# Patient Record
Sex: FEMALE | Race: WHITE | NOT HISPANIC OR LATINO | Employment: OTHER | URBAN - METROPOLITAN AREA
[De-identification: names, ages, dates, MRNs, and addresses within clinical notes are randomized per-mention and may not be internally consistent; named-entity substitution may affect disease eponyms.]

---

## 2017-02-07 ENCOUNTER — COMMUNICATION - HEALTHEAST (OUTPATIENT)
Dept: FAMILY MEDICINE | Facility: CLINIC | Age: 67
End: 2017-02-07

## 2017-02-07 DIAGNOSIS — E78.5 DYSLIPIDEMIA: ICD-10-CM

## 2017-02-20 ENCOUNTER — COMMUNICATION - HEALTHEAST (OUTPATIENT)
Dept: FAMILY MEDICINE | Facility: CLINIC | Age: 67
End: 2017-02-20

## 2017-02-20 DIAGNOSIS — I10 ESSENTIAL HYPERTENSION: ICD-10-CM

## 2017-05-22 ENCOUNTER — COMMUNICATION - HEALTHEAST (OUTPATIENT)
Dept: FAMILY MEDICINE | Facility: CLINIC | Age: 67
End: 2017-05-22

## 2017-05-22 DIAGNOSIS — E03.9 HYPOTHYROIDISM: ICD-10-CM

## 2017-06-09 ENCOUNTER — COMMUNICATION - HEALTHEAST (OUTPATIENT)
Dept: FAMILY MEDICINE | Facility: CLINIC | Age: 67
End: 2017-06-09

## 2017-06-09 DIAGNOSIS — I10 ESSENTIAL HYPERTENSION: ICD-10-CM

## 2017-06-15 ENCOUNTER — COMMUNICATION - HEALTHEAST (OUTPATIENT)
Dept: FAMILY MEDICINE | Facility: CLINIC | Age: 67
End: 2017-06-15

## 2017-06-15 DIAGNOSIS — E78.5 DYSLIPIDEMIA: ICD-10-CM

## 2017-08-22 ENCOUNTER — COMMUNICATION - HEALTHEAST (OUTPATIENT)
Dept: FAMILY MEDICINE | Facility: CLINIC | Age: 67
End: 2017-08-22

## 2017-08-22 ENCOUNTER — COMMUNICATION - HEALTHEAST (OUTPATIENT)
Dept: SURGERY | Facility: CLINIC | Age: 67
End: 2017-08-22

## 2017-08-22 DIAGNOSIS — E03.9 HYPOTHYROIDISM: ICD-10-CM

## 2017-08-22 DIAGNOSIS — R79.89 HIGH SERUM PARATHYROID HORMONE (PTH): ICD-10-CM

## 2017-08-22 DIAGNOSIS — E55.9 VITAMIN D DEFICIENCY: ICD-10-CM

## 2017-08-22 DIAGNOSIS — I10 ESSENTIAL HYPERTENSION: ICD-10-CM

## 2017-08-24 ENCOUNTER — COMMUNICATION - HEALTHEAST (OUTPATIENT)
Dept: FAMILY MEDICINE | Facility: CLINIC | Age: 67
End: 2017-08-24

## 2017-08-24 DIAGNOSIS — E03.9 HYPOTHYROIDISM: ICD-10-CM

## 2017-10-04 ENCOUNTER — COMMUNICATION - HEALTHEAST (OUTPATIENT)
Dept: SURGERY | Facility: CLINIC | Age: 67
End: 2017-10-04

## 2017-10-04 DIAGNOSIS — I10 HYPERTENSION: ICD-10-CM

## 2017-10-26 ENCOUNTER — COMMUNICATION - HEALTHEAST (OUTPATIENT)
Dept: FAMILY MEDICINE | Facility: CLINIC | Age: 67
End: 2017-10-26

## 2017-10-26 DIAGNOSIS — E03.9 HYPOTHYROIDISM: ICD-10-CM

## 2017-10-31 ENCOUNTER — RECORDS - HEALTHEAST (OUTPATIENT)
Dept: ADMINISTRATIVE | Facility: OTHER | Age: 67
End: 2017-10-31

## 2017-11-02 ENCOUNTER — COMMUNICATION - HEALTHEAST (OUTPATIENT)
Dept: FAMILY MEDICINE | Facility: CLINIC | Age: 67
End: 2017-11-02

## 2017-11-10 ENCOUNTER — AMBULATORY - HEALTHEAST (OUTPATIENT)
Dept: SURGERY | Facility: CLINIC | Age: 67
End: 2017-11-10

## 2017-11-10 DIAGNOSIS — E55.9 VITAMIN D DEFICIENCY: ICD-10-CM

## 2017-11-10 DIAGNOSIS — Z98.84 S/P BARIATRIC SURGERY: ICD-10-CM

## 2017-11-10 DIAGNOSIS — K91.2 POSTOPERATIVE MALABSORPTION: ICD-10-CM

## 2017-11-10 DIAGNOSIS — K90.9 INTESTINAL MALABSORPTION, UNSPECIFIED TYPE: ICD-10-CM

## 2017-11-15 ENCOUNTER — AMBULATORY - HEALTHEAST (OUTPATIENT)
Dept: LAB | Facility: HOSPITAL | Age: 67
End: 2017-11-15

## 2017-11-15 DIAGNOSIS — M81.0 SENILE OSTEOPOROSIS: ICD-10-CM

## 2017-11-15 DIAGNOSIS — K91.2 POSTOPERATIVE MALABSORPTION: ICD-10-CM

## 2017-11-15 DIAGNOSIS — Z98.84 S/P BARIATRIC SURGERY: ICD-10-CM

## 2017-11-15 DIAGNOSIS — K90.9 INTESTINAL MALABSORPTION, UNSPECIFIED TYPE: ICD-10-CM

## 2017-11-15 DIAGNOSIS — E55.9 VITAMIN D DEFICIENCY: ICD-10-CM

## 2017-11-15 LAB
CHOLEST SERPL-MCNC: 175 MG/DL
FASTING STATUS PATIENT QL REPORTED: YES
HDLC SERPL-MCNC: 52 MG/DL
LDLC SERPL CALC-MCNC: 108 MG/DL
TRIGL SERPL-MCNC: 73 MG/DL

## 2017-11-16 LAB — HBA1C MFR BLD: 5.7 % (ref 4.2–6.1)

## 2017-11-22 ENCOUNTER — AMBULATORY - HEALTHEAST (OUTPATIENT)
Dept: LAB | Facility: HOSPITAL | Age: 67
End: 2017-11-22

## 2017-11-22 ENCOUNTER — AMBULATORY - HEALTHEAST (OUTPATIENT)
Dept: SCHEDULING | Facility: CLINIC | Age: 67
End: 2017-11-22

## 2017-11-22 ENCOUNTER — OFFICE VISIT - HEALTHEAST (OUTPATIENT)
Dept: SURGERY | Facility: CLINIC | Age: 67
End: 2017-11-22

## 2017-11-22 DIAGNOSIS — K91.2 POSTOPERATIVE MALABSORPTION: ICD-10-CM

## 2017-11-22 DIAGNOSIS — M81.0 OSTEOPOROSIS: ICD-10-CM

## 2017-11-22 DIAGNOSIS — M81.0 SENILE OSTEOPOROSIS: ICD-10-CM

## 2017-11-22 DIAGNOSIS — E03.9 HYPOTHYROIDISM: ICD-10-CM

## 2017-11-22 LAB
CREAT SERPL-MCNC: 0.62 MG/DL (ref 0.6–1.1)
GFR SERPL CREATININE-BSD FRML MDRD: >60 ML/MIN/1.73M2

## 2017-11-22 ASSESSMENT — MIFFLIN-ST. JEOR: SCORE: 1641.73

## 2017-11-23 ENCOUNTER — COMMUNICATION - HEALTHEAST (OUTPATIENT)
Dept: FAMILY MEDICINE | Facility: CLINIC | Age: 67
End: 2017-11-23

## 2017-11-26 ENCOUNTER — COMMUNICATION - HEALTHEAST (OUTPATIENT)
Dept: FAMILY MEDICINE | Facility: CLINIC | Age: 67
End: 2017-11-26

## 2017-11-26 DIAGNOSIS — I10 ESSENTIAL HYPERTENSION: ICD-10-CM

## 2017-12-18 ENCOUNTER — OFFICE VISIT - HEALTHEAST (OUTPATIENT)
Dept: FAMILY MEDICINE | Facility: CLINIC | Age: 67
End: 2017-12-18

## 2017-12-18 DIAGNOSIS — Z98.84 HISTORY OF GASTRIC BYPASS: ICD-10-CM

## 2017-12-18 DIAGNOSIS — N32.81 OVERACTIVE BLADDER: ICD-10-CM

## 2017-12-18 DIAGNOSIS — I89.0 ACQUIRED LYMPHEDEMA OF LEG: ICD-10-CM

## 2017-12-18 DIAGNOSIS — I10 ESSENTIAL HYPERTENSION: ICD-10-CM

## 2017-12-18 DIAGNOSIS — E03.9 HYPOTHYROIDISM: ICD-10-CM

## 2018-01-25 ENCOUNTER — COMMUNICATION - HEALTHEAST (OUTPATIENT)
Dept: FAMILY MEDICINE | Facility: CLINIC | Age: 68
End: 2018-01-25

## 2018-02-06 ENCOUNTER — RECORDS - HEALTHEAST (OUTPATIENT)
Dept: ADMINISTRATIVE | Facility: OTHER | Age: 68
End: 2018-02-06

## 2018-02-10 ENCOUNTER — COMMUNICATION - HEALTHEAST (OUTPATIENT)
Dept: FAMILY MEDICINE | Facility: CLINIC | Age: 68
End: 2018-02-10

## 2018-02-10 DIAGNOSIS — E03.9 HYPOTHYROIDISM: ICD-10-CM

## 2018-02-22 ENCOUNTER — OFFICE VISIT - HEALTHEAST (OUTPATIENT)
Dept: FAMILY MEDICINE | Facility: CLINIC | Age: 68
End: 2018-02-22

## 2018-02-22 DIAGNOSIS — I10 ESSENTIAL HYPERTENSION: ICD-10-CM

## 2018-02-22 DIAGNOSIS — R79.89 ELEVATED PARATHYROID HORMONE: ICD-10-CM

## 2018-02-22 DIAGNOSIS — N32.81 OVERACTIVE BLADDER: ICD-10-CM

## 2018-02-22 DIAGNOSIS — E03.9 HYPOTHYROIDISM: ICD-10-CM

## 2018-03-22 ENCOUNTER — OFFICE VISIT - HEALTHEAST (OUTPATIENT)
Dept: FAMILY MEDICINE | Facility: CLINIC | Age: 68
End: 2018-03-22

## 2018-03-22 DIAGNOSIS — M16.9 OA (OSTEOARTHRITIS) OF HIP: ICD-10-CM

## 2018-03-22 DIAGNOSIS — I10 ESSENTIAL HYPERTENSION: ICD-10-CM

## 2018-03-22 DIAGNOSIS — M25.532 LEFT WRIST PAIN: ICD-10-CM

## 2018-03-22 DIAGNOSIS — E03.9 HYPOTHYROIDISM: ICD-10-CM

## 2018-03-22 DIAGNOSIS — E11.9 TYPE 2 DIABETES MELLITUS WITHOUT COMPLICATION (H): ICD-10-CM

## 2018-03-22 DIAGNOSIS — E21.3 HYPERPARATHYROIDISM (H): ICD-10-CM

## 2018-03-22 DIAGNOSIS — L98.9 SKIN LESION OF FACE: ICD-10-CM

## 2018-03-22 DIAGNOSIS — N32.81 OVERACTIVE BLADDER: ICD-10-CM

## 2018-03-22 LAB
CREAT UR-MCNC: 15.8 MG/DL
HBA1C MFR BLD: 5.3 % (ref 3.5–6)
MICROALBUMIN UR-MCNC: <0.5 MG/DL (ref 0–1.99)
MICROALBUMIN/CREAT UR: NORMAL MG/G
T4 FREE SERPL-MCNC: 1.3 NG/DL (ref 0.7–1.8)
TSH SERPL DL<=0.005 MIU/L-ACNC: 0.11 UIU/ML (ref 0.3–5)

## 2018-03-25 ENCOUNTER — COMMUNICATION - HEALTHEAST (OUTPATIENT)
Dept: FAMILY MEDICINE | Facility: CLINIC | Age: 68
End: 2018-03-25

## 2018-03-25 DIAGNOSIS — E03.9 HYPOTHYROIDISM: ICD-10-CM

## 2018-04-03 ENCOUNTER — COMMUNICATION - HEALTHEAST (OUTPATIENT)
Dept: FAMILY MEDICINE | Facility: CLINIC | Age: 68
End: 2018-04-03

## 2018-04-05 ENCOUNTER — COMMUNICATION - HEALTHEAST (OUTPATIENT)
Dept: FAMILY MEDICINE | Facility: CLINIC | Age: 68
End: 2018-04-05

## 2018-05-06 ENCOUNTER — COMMUNICATION - HEALTHEAST (OUTPATIENT)
Dept: FAMILY MEDICINE | Facility: CLINIC | Age: 68
End: 2018-05-06

## 2018-05-06 DIAGNOSIS — E78.5 DYSLIPIDEMIA: ICD-10-CM

## 2018-05-22 ENCOUNTER — RECORDS - HEALTHEAST (OUTPATIENT)
Dept: ADMINISTRATIVE | Facility: OTHER | Age: 68
End: 2018-05-22

## 2018-05-25 ENCOUNTER — COMMUNICATION - HEALTHEAST (OUTPATIENT)
Dept: FAMILY MEDICINE | Facility: CLINIC | Age: 68
End: 2018-05-25

## 2018-06-23 ENCOUNTER — OFFICE VISIT - HEALTHEAST (OUTPATIENT)
Dept: FAMILY MEDICINE | Facility: CLINIC | Age: 68
End: 2018-06-23

## 2018-06-23 DIAGNOSIS — J06.9 UPPER RESPIRATORY INFECTION: ICD-10-CM

## 2018-06-23 DIAGNOSIS — R39.9 UTI SYMPTOMS: ICD-10-CM

## 2018-06-23 DIAGNOSIS — N30.00 ACUTE CYSTITIS WITHOUT HEMATURIA: ICD-10-CM

## 2018-06-23 LAB
ALBUMIN UR-MCNC: NEGATIVE MG/DL
APPEARANCE UR: CLEAR
BACTERIA #/AREA URNS HPF: ABNORMAL HPF
BILIRUB UR QL STRIP: NEGATIVE
COLOR UR AUTO: YELLOW
GLUCOSE UR STRIP-MCNC: NEGATIVE MG/DL
HGB UR QL STRIP: ABNORMAL
KETONES UR STRIP-MCNC: NEGATIVE MG/DL
LEUKOCYTE ESTERASE UR QL STRIP: ABNORMAL
NITRATE UR QL: NEGATIVE
PH UR STRIP: 6 [PH] (ref 5–8)
RBC #/AREA URNS AUTO: ABNORMAL HPF
SP GR UR STRIP: <=1.005 (ref 1–1.03)
SQUAMOUS #/AREA URNS AUTO: ABNORMAL LPF
UROBILINOGEN UR STRIP-ACNC: ABNORMAL
WBC #/AREA URNS AUTO: ABNORMAL HPF

## 2018-06-25 LAB — BACTERIA SPEC CULT: ABNORMAL

## 2018-07-16 ENCOUNTER — OFFICE VISIT - HEALTHEAST (OUTPATIENT)
Dept: FAMILY MEDICINE | Facility: CLINIC | Age: 68
End: 2018-07-16

## 2018-07-16 DIAGNOSIS — I10 ESSENTIAL HYPERTENSION: ICD-10-CM

## 2018-07-16 DIAGNOSIS — E03.9 HYPOTHYROIDISM: ICD-10-CM

## 2018-07-16 DIAGNOSIS — E11.9 TYPE 2 DIABETES MELLITUS WITHOUT COMPLICATION (H): ICD-10-CM

## 2018-07-16 DIAGNOSIS — Z12.11 SCREEN FOR COLON CANCER: ICD-10-CM

## 2018-07-16 LAB
HBA1C MFR BLD: 5.6 % (ref 3.5–6)
T4 FREE SERPL-MCNC: 1 NG/DL (ref 0.7–1.8)
TSH SERPL DL<=0.005 MIU/L-ACNC: 0.23 UIU/ML (ref 0.3–5)

## 2018-07-24 ENCOUNTER — OFFICE VISIT - HEALTHEAST (OUTPATIENT)
Dept: SURGERY | Facility: CLINIC | Age: 68
End: 2018-07-24

## 2018-07-24 DIAGNOSIS — F43.20 ADJUSTMENT DISORDER: ICD-10-CM

## 2018-09-21 ENCOUNTER — OFFICE VISIT - HEALTHEAST (OUTPATIENT)
Dept: VASCULAR SURGERY | Facility: CLINIC | Age: 68
End: 2018-09-21

## 2018-09-21 DIAGNOSIS — L90.5 SCAR CONDITION AND FIBROSIS OF SKIN: ICD-10-CM

## 2018-09-21 DIAGNOSIS — I89.0 ACQUIRED LYMPHEDEMA OF LEG: ICD-10-CM

## 2018-09-21 DIAGNOSIS — I87.8 VENOUS STASIS OF LOWER EXTREMITY: ICD-10-CM

## 2018-09-21 ASSESSMENT — MIFFLIN-ST. JEOR: SCORE: 1790.51

## 2018-11-10 ENCOUNTER — COMMUNICATION - HEALTHEAST (OUTPATIENT)
Dept: FAMILY MEDICINE | Facility: CLINIC | Age: 68
End: 2018-11-10

## 2018-11-10 DIAGNOSIS — E78.5 DYSLIPIDEMIA: ICD-10-CM

## 2018-11-21 ENCOUNTER — OFFICE VISIT - HEALTHEAST (OUTPATIENT)
Dept: FAMILY MEDICINE | Facility: CLINIC | Age: 68
End: 2018-11-21

## 2018-11-21 DIAGNOSIS — N30.01 ACUTE CYSTITIS WITH HEMATURIA: ICD-10-CM

## 2018-11-21 DIAGNOSIS — R30.0 BURNING WITH URINATION: ICD-10-CM

## 2018-11-21 LAB
ALBUMIN UR-MCNC: NEGATIVE MG/DL
APPEARANCE UR: CLEAR
BACTERIA #/AREA URNS HPF: ABNORMAL HPF
BILIRUB UR QL STRIP: NEGATIVE
COLOR UR AUTO: YELLOW
GLUCOSE UR STRIP-MCNC: NEGATIVE MG/DL
HGB UR QL STRIP: ABNORMAL
KETONES UR STRIP-MCNC: NEGATIVE MG/DL
LEUKOCYTE ESTERASE UR QL STRIP: ABNORMAL
NITRATE UR QL: NEGATIVE
PH UR STRIP: 6 [PH] (ref 5–8)
RBC #/AREA URNS AUTO: ABNORMAL HPF
SP GR UR STRIP: 1.01 (ref 1–1.03)
SQUAMOUS #/AREA URNS AUTO: ABNORMAL LPF
UROBILINOGEN UR STRIP-ACNC: ABNORMAL
WBC #/AREA URNS AUTO: ABNORMAL HPF
WBC CLUMPS #/AREA URNS HPF: PRESENT /[HPF]

## 2018-11-24 LAB — BACTERIA SPEC CULT: ABNORMAL

## 2019-01-02 ENCOUNTER — COMMUNICATION - HEALTHEAST (OUTPATIENT)
Dept: FAMILY MEDICINE | Facility: CLINIC | Age: 69
End: 2019-01-02

## 2019-01-02 DIAGNOSIS — N32.81 OVERACTIVE BLADDER: ICD-10-CM

## 2019-03-03 ENCOUNTER — COMMUNICATION - HEALTHEAST (OUTPATIENT)
Dept: FAMILY MEDICINE | Facility: CLINIC | Age: 69
End: 2019-03-03

## 2019-03-03 DIAGNOSIS — I10 ESSENTIAL HYPERTENSION: ICD-10-CM

## 2019-03-10 ENCOUNTER — COMMUNICATION - HEALTHEAST (OUTPATIENT)
Dept: FAMILY MEDICINE | Facility: CLINIC | Age: 69
End: 2019-03-10

## 2019-03-10 DIAGNOSIS — I10 ESSENTIAL HYPERTENSION: ICD-10-CM

## 2019-04-07 ENCOUNTER — COMMUNICATION - HEALTHEAST (OUTPATIENT)
Dept: FAMILY MEDICINE | Facility: CLINIC | Age: 69
End: 2019-04-07

## 2019-04-07 DIAGNOSIS — I10 ESSENTIAL HYPERTENSION: ICD-10-CM

## 2019-04-28 ENCOUNTER — COMMUNICATION - HEALTHEAST (OUTPATIENT)
Dept: FAMILY MEDICINE | Facility: CLINIC | Age: 69
End: 2019-04-28

## 2019-04-28 DIAGNOSIS — E03.9 HYPOTHYROIDISM, UNSPECIFIED TYPE: ICD-10-CM

## 2019-06-02 ENCOUNTER — COMMUNICATION - HEALTHEAST (OUTPATIENT)
Dept: FAMILY MEDICINE | Facility: CLINIC | Age: 69
End: 2019-06-02

## 2019-06-02 DIAGNOSIS — I10 ESSENTIAL HYPERTENSION: ICD-10-CM

## 2019-06-30 ENCOUNTER — COMMUNICATION - HEALTHEAST (OUTPATIENT)
Dept: FAMILY MEDICINE | Facility: CLINIC | Age: 69
End: 2019-06-30

## 2019-06-30 DIAGNOSIS — I10 ESSENTIAL HYPERTENSION: ICD-10-CM

## 2019-08-17 ENCOUNTER — COMMUNICATION - HEALTHEAST (OUTPATIENT)
Dept: FAMILY MEDICINE | Facility: CLINIC | Age: 69
End: 2019-08-17

## 2019-08-17 DIAGNOSIS — E78.5 DYSLIPIDEMIA: ICD-10-CM

## 2019-08-26 ENCOUNTER — COMMUNICATION - HEALTHEAST (OUTPATIENT)
Dept: FAMILY MEDICINE | Facility: CLINIC | Age: 69
End: 2019-08-26

## 2019-09-01 ENCOUNTER — COMMUNICATION - HEALTHEAST (OUTPATIENT)
Dept: FAMILY MEDICINE | Facility: CLINIC | Age: 69
End: 2019-09-01

## 2019-09-01 DIAGNOSIS — I10 ESSENTIAL HYPERTENSION: ICD-10-CM

## 2019-09-27 ENCOUNTER — COMMUNICATION - HEALTHEAST (OUTPATIENT)
Dept: FAMILY MEDICINE | Facility: CLINIC | Age: 69
End: 2019-09-27

## 2019-09-27 ENCOUNTER — COMMUNICATION - HEALTHEAST (OUTPATIENT)
Dept: SURGERY | Facility: CLINIC | Age: 69
End: 2019-09-27

## 2019-09-27 DIAGNOSIS — E03.9 HYPOTHYROIDISM, UNSPECIFIED TYPE: ICD-10-CM

## 2019-09-29 ENCOUNTER — COMMUNICATION - HEALTHEAST (OUTPATIENT)
Dept: FAMILY MEDICINE | Facility: CLINIC | Age: 69
End: 2019-09-29

## 2019-09-29 DIAGNOSIS — I10 ESSENTIAL HYPERTENSION: ICD-10-CM

## 2019-10-04 ENCOUNTER — COMMUNICATION - HEALTHEAST (OUTPATIENT)
Dept: SURGERY | Facility: CLINIC | Age: 69
End: 2019-10-04

## 2019-10-04 DIAGNOSIS — Z98.84 S/P BARIATRIC SURGERY: ICD-10-CM

## 2019-10-04 DIAGNOSIS — K90.9 INTESTINAL MALABSORPTION, UNSPECIFIED TYPE: ICD-10-CM

## 2019-10-04 DIAGNOSIS — K91.2 POSTOPERATIVE MALABSORPTION: ICD-10-CM

## 2019-10-28 ENCOUNTER — OFFICE VISIT - HEALTHEAST (OUTPATIENT)
Dept: FAMILY MEDICINE | Facility: CLINIC | Age: 69
End: 2019-10-28

## 2019-10-28 DIAGNOSIS — E21.3 HYPERPARATHYROIDISM (H): ICD-10-CM

## 2019-10-28 DIAGNOSIS — N32.81 OVERACTIVE BLADDER: ICD-10-CM

## 2019-10-28 DIAGNOSIS — Z98.84 S/P BARIATRIC SURGERY: ICD-10-CM

## 2019-10-28 DIAGNOSIS — I10 ESSENTIAL HYPERTENSION: ICD-10-CM

## 2019-10-28 DIAGNOSIS — K90.9 INTESTINAL MALABSORPTION, UNSPECIFIED TYPE: ICD-10-CM

## 2019-10-28 DIAGNOSIS — Z12.11 SCREEN FOR COLON CANCER: ICD-10-CM

## 2019-10-28 DIAGNOSIS — Z78.0 POSTMENOPAUSAL STATUS: ICD-10-CM

## 2019-10-28 DIAGNOSIS — E11.9 TYPE 2 DIABETES MELLITUS WITHOUT COMPLICATION, WITHOUT LONG-TERM CURRENT USE OF INSULIN (H): ICD-10-CM

## 2019-10-28 DIAGNOSIS — Z12.31 VISIT FOR SCREENING MAMMOGRAM: ICD-10-CM

## 2019-10-28 DIAGNOSIS — E03.9 HYPOTHYROIDISM, UNSPECIFIED TYPE: ICD-10-CM

## 2019-10-28 DIAGNOSIS — K91.2 POSTOPERATIVE MALABSORPTION: ICD-10-CM

## 2019-10-28 DIAGNOSIS — E78.5 DYSLIPIDEMIA: ICD-10-CM

## 2019-10-28 LAB
ALBUMIN SERPL-MCNC: 3.7 G/DL (ref 3.5–5)
ALP SERPL-CCNC: 120 U/L (ref 45–120)
ALT SERPL W P-5'-P-CCNC: 21 U/L (ref 0–45)
ANION GAP SERPL CALCULATED.3IONS-SCNC: 9 MMOL/L (ref 5–18)
AST SERPL W P-5'-P-CCNC: 18 U/L (ref 0–40)
BILIRUB SERPL-MCNC: 0.4 MG/DL (ref 0–1)
BUN SERPL-MCNC: 16 MG/DL (ref 8–22)
CALCIUM SERPL-MCNC: 9.4 MG/DL (ref 8.5–10.5)
CHLORIDE BLD-SCNC: 106 MMOL/L (ref 98–107)
CHOLEST SERPL-MCNC: 166 MG/DL
CO2 SERPL-SCNC: 25 MMOL/L (ref 22–31)
CREAT SERPL-MCNC: 0.66 MG/DL (ref 0.6–1.1)
ERYTHROCYTE [DISTWIDTH] IN BLOOD BY AUTOMATED COUNT: 12.8 % (ref 11–14.5)
FASTING STATUS PATIENT QL REPORTED: ABNORMAL
FERRITIN SERPL-MCNC: 47 NG/ML (ref 10–130)
FOLATE SERPL-MCNC: >20 NG/ML
GFR SERPL CREATININE-BSD FRML MDRD: >60 ML/MIN/1.73M2
GLUCOSE BLD-MCNC: 101 MG/DL (ref 70–125)
HBA1C MFR BLD: 5.6 % (ref 3.5–6)
HCT VFR BLD AUTO: 41.4 % (ref 35–47)
HDLC SERPL-MCNC: 41 MG/DL
HGB BLD-MCNC: 14.1 G/DL (ref 12–16)
LDLC SERPL CALC-MCNC: 100 MG/DL
MCH RBC QN AUTO: 29.4 PG (ref 27–34)
MCHC RBC AUTO-ENTMCNC: 34.1 G/DL (ref 32–36)
MCV RBC AUTO: 86 FL (ref 80–100)
PLATELET # BLD AUTO: 267 THOU/UL (ref 140–440)
PMV BLD AUTO: 7.2 FL (ref 7–10)
POTASSIUM BLD-SCNC: 4.4 MMOL/L (ref 3.5–5)
PROT SERPL-MCNC: 7.1 G/DL (ref 6–8)
PTH-INTACT SERPL-MCNC: 176 PG/ML (ref 10–86)
RBC # BLD AUTO: 4.79 MILL/UL (ref 3.8–5.4)
SODIUM SERPL-SCNC: 140 MMOL/L (ref 136–145)
TRIGL SERPL-MCNC: 124 MG/DL
VIT B12 SERPL-MCNC: >2000 PG/ML (ref 213–816)
WBC: 9.7 THOU/UL (ref 4–11)

## 2019-10-28 ASSESSMENT — MIFFLIN-ST. JEOR: SCORE: 1736.53

## 2019-10-29 LAB — 25(OH)D3 SERPL-MCNC: 63.4 NG/ML (ref 30–80)

## 2019-10-31 LAB
ANNOTATION COMMENT IMP: NORMAL
VIT A SERPL-MCNC: 0.71 MG/L (ref 0.3–1.2)
VIT B1 PYROPHOSHATE BLD-SCNC: 57 NMOL/L (ref 70–180)
VITAMIN A (RETINYL PALMITATE): 0.02 MG/L (ref 0–0.1)
ZINC SERPL-MCNC: 104.8 UG/DL (ref 60–120)

## 2019-11-01 ENCOUNTER — AMBULATORY - HEALTHEAST (OUTPATIENT)
Dept: SURGERY | Facility: CLINIC | Age: 69
End: 2019-11-01

## 2019-11-01 DIAGNOSIS — E51.9 THIAMINE DEFICIENCY: ICD-10-CM

## 2019-11-01 DIAGNOSIS — K91.2 POSTOPERATIVE MALABSORPTION: ICD-10-CM

## 2020-01-02 ENCOUNTER — COMMUNICATION - HEALTHEAST (OUTPATIENT)
Dept: FAMILY MEDICINE | Facility: CLINIC | Age: 70
End: 2020-01-02

## 2020-01-02 DIAGNOSIS — R19.5 POSITIVE COLORECTAL CANCER SCREENING USING COLOGUARD TEST: ICD-10-CM

## 2020-01-30 ENCOUNTER — COMMUNICATION - HEALTHEAST (OUTPATIENT)
Dept: FAMILY MEDICINE | Facility: CLINIC | Age: 70
End: 2020-01-30

## 2020-02-05 ENCOUNTER — RECORDS - HEALTHEAST (OUTPATIENT)
Dept: ADMINISTRATIVE | Facility: OTHER | Age: 70
End: 2020-02-05

## 2020-02-06 ENCOUNTER — RECORDS - HEALTHEAST (OUTPATIENT)
Dept: ADMINISTRATIVE | Facility: OTHER | Age: 70
End: 2020-02-06

## 2020-02-10 ENCOUNTER — HOSPITAL ENCOUNTER (OUTPATIENT)
Dept: MAMMOGRAPHY | Facility: CLINIC | Age: 70
Discharge: HOME | End: 2020-02-10
Attending: FAMILY MEDICINE

## 2020-02-10 DIAGNOSIS — Z12.31 VISIT FOR SCREENING MAMMOGRAM: ICD-10-CM

## 2020-02-19 ENCOUNTER — RECORDS - HEALTHEAST (OUTPATIENT)
Dept: ADMINISTRATIVE | Facility: OTHER | Age: 70
End: 2020-02-19

## 2020-02-20 ENCOUNTER — RECORDS - HEALTHEAST (OUTPATIENT)
Dept: ADMINISTRATIVE | Facility: OTHER | Age: 70
End: 2020-02-20

## 2020-02-23 ENCOUNTER — COMMUNICATION - HEALTHEAST (OUTPATIENT)
Dept: FAMILY MEDICINE | Facility: CLINIC | Age: 70
End: 2020-02-23

## 2020-02-23 DIAGNOSIS — I10 ESSENTIAL HYPERTENSION: ICD-10-CM

## 2020-02-23 DIAGNOSIS — E78.5 DYSLIPIDEMIA: ICD-10-CM

## 2020-03-10 ENCOUNTER — RECORDS - HEALTHEAST (OUTPATIENT)
Dept: ADMINISTRATIVE | Facility: OTHER | Age: 70
End: 2020-03-10

## 2020-03-10 LAB — RETINOPATHY: NEGATIVE

## 2020-03-12 ENCOUNTER — RECORDS - HEALTHEAST (OUTPATIENT)
Dept: HEALTH INFORMATION MANAGEMENT | Facility: CLINIC | Age: 70
End: 2020-03-12

## 2020-03-16 ENCOUNTER — COMMUNICATION - HEALTHEAST (OUTPATIENT)
Dept: FAMILY MEDICINE | Facility: CLINIC | Age: 70
End: 2020-03-16

## 2020-03-17 ENCOUNTER — COMMUNICATION - HEALTHEAST (OUTPATIENT)
Dept: FAMILY MEDICINE | Facility: CLINIC | Age: 70
End: 2020-03-17

## 2020-05-05 ENCOUNTER — AMBULATORY - HEALTHEAST (OUTPATIENT)
Dept: SURGERY | Facility: HOSPITAL | Age: 70
End: 2020-05-05

## 2020-05-05 DIAGNOSIS — Z11.59 ENCOUNTER FOR SCREENING FOR OTHER VIRAL DISEASES: ICD-10-CM

## 2020-05-14 ENCOUNTER — OFFICE VISIT - HEALTHEAST (OUTPATIENT)
Dept: FAMILY MEDICINE | Facility: CLINIC | Age: 70
End: 2020-05-14

## 2020-05-14 ENCOUNTER — AMBULATORY - HEALTHEAST (OUTPATIENT)
Dept: LAB | Facility: CLINIC | Age: 70
End: 2020-05-14

## 2020-05-14 DIAGNOSIS — E11.9 TYPE 2 DIABETES MELLITUS WITHOUT COMPLICATION, WITHOUT LONG-TERM CURRENT USE OF INSULIN (H): ICD-10-CM

## 2020-05-14 DIAGNOSIS — Z01.818 PRE-OP EXAM: ICD-10-CM

## 2020-05-14 DIAGNOSIS — Z86.0100 HISTORY OF COLONIC POLYPS: ICD-10-CM

## 2020-05-14 LAB
ANION GAP SERPL CALCULATED.3IONS-SCNC: 7 MMOL/L (ref 5–18)
ATRIAL RATE - MUSE: 65 BPM
BUN SERPL-MCNC: 13 MG/DL (ref 8–22)
CALCIUM SERPL-MCNC: 9.6 MG/DL (ref 8.5–10.5)
CHLORIDE BLD-SCNC: 106 MMOL/L (ref 98–107)
CO2 SERPL-SCNC: 28 MMOL/L (ref 22–31)
CREAT SERPL-MCNC: 0.69 MG/DL (ref 0.6–1.1)
DIASTOLIC BLOOD PRESSURE - MUSE: NORMAL
ERYTHROCYTE [DISTWIDTH] IN BLOOD BY AUTOMATED COUNT: 12.5 % (ref 11–14.5)
GFR SERPL CREATININE-BSD FRML MDRD: >60 ML/MIN/1.73M2
GLUCOSE BLD-MCNC: 92 MG/DL (ref 70–125)
HBA1C MFR BLD: 5.4 % (ref 3.5–6)
HCT VFR BLD AUTO: 40.9 % (ref 35–47)
HGB BLD-MCNC: 14 G/DL (ref 12–16)
INTERPRETATION ECG - MUSE: NORMAL
MCH RBC QN AUTO: 30.2 PG (ref 27–34)
MCHC RBC AUTO-ENTMCNC: 34.2 G/DL (ref 32–36)
MCV RBC AUTO: 88 FL (ref 80–100)
P AXIS - MUSE: 54 DEGREES
PLATELET # BLD AUTO: 293 THOU/UL (ref 140–440)
PMV BLD AUTO: 7.2 FL (ref 7–10)
POTASSIUM BLD-SCNC: 4.5 MMOL/L (ref 3.5–5)
PR INTERVAL - MUSE: 186 MS
QRS DURATION - MUSE: 96 MS
QT - MUSE: 414 MS
QTC - MUSE: 430 MS
R AXIS - MUSE: 27 DEGREES
RBC # BLD AUTO: 4.63 MILL/UL (ref 3.8–5.4)
SODIUM SERPL-SCNC: 141 MMOL/L (ref 136–145)
SYSTOLIC BLOOD PRESSURE - MUSE: NORMAL
T AXIS - MUSE: 83 DEGREES
VENTRICULAR RATE- MUSE: 65 BPM
WBC: 11.8 THOU/UL (ref 4–11)

## 2020-05-14 ASSESSMENT — MIFFLIN-ST. JEOR: SCORE: 1775.53

## 2020-05-15 ENCOUNTER — COMMUNICATION - HEALTHEAST (OUTPATIENT)
Dept: FAMILY MEDICINE | Facility: CLINIC | Age: 70
End: 2020-05-15

## 2020-05-20 ENCOUNTER — OFFICE VISIT - HEALTHEAST (OUTPATIENT)
Dept: FAMILY MEDICINE | Facility: CLINIC | Age: 70
End: 2020-05-20

## 2020-05-20 DIAGNOSIS — Z11.59 ENCOUNTER FOR SCREENING FOR OTHER VIRAL DISEASES: ICD-10-CM

## 2020-05-22 ENCOUNTER — ANESTHESIA - HEALTHEAST (OUTPATIENT)
Dept: SURGERY | Facility: HOSPITAL | Age: 70
End: 2020-05-22

## 2020-05-22 ENCOUNTER — SURGERY - HEALTHEAST (OUTPATIENT)
Dept: SURGERY | Facility: HOSPITAL | Age: 70
End: 2020-05-22

## 2020-05-22 ASSESSMENT — MIFFLIN-ST. JEOR
SCORE: 1775.53
SCORE: 1782.34

## 2020-05-26 ENCOUNTER — HOME CARE/HOSPICE - HEALTHEAST (OUTPATIENT)
Dept: HOME HEALTH SERVICES | Facility: HOME HEALTH | Age: 70
End: 2020-05-26

## 2020-05-29 ENCOUNTER — COMMUNICATION - HEALTHEAST (OUTPATIENT)
Dept: HOME HEALTH SERVICES | Facility: HOME HEALTH | Age: 70
End: 2020-05-29

## 2020-05-29 ENCOUNTER — HOME CARE/HOSPICE - HEALTHEAST (OUTPATIENT)
Dept: HOME HEALTH SERVICES | Facility: HOME HEALTH | Age: 70
End: 2020-05-29

## 2020-05-30 ENCOUNTER — HOME CARE/HOSPICE - HEALTHEAST (OUTPATIENT)
Dept: HOME HEALTH SERVICES | Facility: HOME HEALTH | Age: 70
End: 2020-05-30

## 2020-06-01 ENCOUNTER — HOME CARE/HOSPICE - HEALTHEAST (OUTPATIENT)
Dept: HOME HEALTH SERVICES | Facility: HOME HEALTH | Age: 70
End: 2020-06-01

## 2020-06-02 ENCOUNTER — COMMUNICATION - HEALTHEAST (OUTPATIENT)
Dept: HOME HEALTH SERVICES | Facility: HOME HEALTH | Age: 70
End: 2020-06-02

## 2020-06-02 ENCOUNTER — HOME CARE/HOSPICE - HEALTHEAST (OUTPATIENT)
Dept: HOME HEALTH SERVICES | Facility: HOME HEALTH | Age: 70
End: 2020-06-02

## 2020-06-03 ENCOUNTER — OFFICE VISIT - HEALTHEAST (OUTPATIENT)
Dept: FAMILY MEDICINE | Facility: CLINIC | Age: 70
End: 2020-06-03

## 2020-06-03 DIAGNOSIS — K63.5 CECAL POLYP: ICD-10-CM

## 2020-06-03 DIAGNOSIS — E03.9 HYPOTHYROIDISM, UNSPECIFIED TYPE: ICD-10-CM

## 2020-06-03 DIAGNOSIS — E11.9 TYPE 2 DIABETES MELLITUS WITHOUT COMPLICATION, WITHOUT LONG-TERM CURRENT USE OF INSULIN (H): ICD-10-CM

## 2020-06-03 DIAGNOSIS — R93.3 ABNORMAL COLONOSCOPY: ICD-10-CM

## 2020-06-03 DIAGNOSIS — I10 ESSENTIAL HYPERTENSION: ICD-10-CM

## 2020-06-03 ASSESSMENT — PATIENT HEALTH QUESTIONNAIRE - PHQ9: SUM OF ALL RESPONSES TO PHQ QUESTIONS 1-9: 1

## 2020-06-05 ENCOUNTER — HOME CARE/HOSPICE - HEALTHEAST (OUTPATIENT)
Dept: HOME HEALTH SERVICES | Facility: HOME HEALTH | Age: 70
End: 2020-06-05

## 2020-06-24 ENCOUNTER — RECORDS - HEALTHEAST (OUTPATIENT)
Dept: ADMINISTRATIVE | Facility: OTHER | Age: 70
End: 2020-06-24

## 2020-08-02 ENCOUNTER — COMMUNICATION - HEALTHEAST (OUTPATIENT)
Dept: FAMILY MEDICINE | Facility: CLINIC | Age: 70
End: 2020-08-02

## 2020-08-02 DIAGNOSIS — I10 ESSENTIAL HYPERTENSION: ICD-10-CM

## 2020-08-09 ENCOUNTER — COMMUNICATION - HEALTHEAST (OUTPATIENT)
Dept: FAMILY MEDICINE | Facility: CLINIC | Age: 70
End: 2020-08-09

## 2020-08-09 DIAGNOSIS — N32.81 OVERACTIVE BLADDER: ICD-10-CM

## 2020-09-11 ENCOUNTER — VIRTUAL VISIT (OUTPATIENT)
Dept: FAMILY MEDICINE | Facility: OTHER | Age: 70
End: 2020-09-11

## 2020-09-12 ENCOUNTER — AMBULATORY - HEALTHEAST (OUTPATIENT)
Dept: FAMILY MEDICINE | Facility: CLINIC | Age: 70
End: 2020-09-12

## 2020-09-12 DIAGNOSIS — Z20.822 SUSPECTED COVID-19 VIRUS INFECTION: ICD-10-CM

## 2020-09-12 NOTE — PROGRESS NOTES
"Date: 2020 14:03:53  Clinician: June Sánchez  Clinician NPI: [id number]  Patient: Glenna Estrada  Patient : 1950  Patient Address: [facility] [place] [id number]  Patient Phone: [phone]  Visit Protocol: URI  Patient Summary:  Glenna is a 70 year old ( : 1950 ) female who initiated a [facility] Visit for COVID-19 (Coronavirus) evaluation and screening. When asked the question \"Please sign me up to receive news, health information and promotions. \", Glenna responded \"No\".    When asked when her symptoms started, Glenna reported that she does not have any symptoms.   She denies taking antibiotic medication in the past month and having recent facial or sinus surgery in the past 60 days.    Pertinent COVID-19 (Coronavirus) information  In the past 14 days, Glenna has not worked in a congregate living setting.   She does not work or volunteer as healthcare worker or a  and does not work or volunteer in a healthcare facility.   Glenna also has not lived in a congregate living setting in the past 14 days. She does not live with a healthcare worker.   Glenna has had a close contact with a laboratory-confirmed COVID-19 patient in the last 14 days. She was not exposed at her work. Additional information about contact with COVID-19 (Coronavirus) patient as reported by the patient (free text): My brotherinlaw   from Covid at [facility].  On  my niece and 4 children who lived with him came over to give me a gift.  We did not know that him had it then.  The visit was about 15 minutes outdoors.  Niece's doc is assuming they could have covid since them live together.  Also without thinking I went in my friends car  for most of the day without masks.  I feel fine but my family wants me to check it out.    Patient reported they are not living in the same household with a COVID-19 positive patient.  Patient denies being in an enclosed space for greater than 15 minutes with a COVID-19 " patient.  Since December 2019, Glenna and has not had upper respiratory infection or influenza-like illness. Has not been diagnosed with lab-confirmed COVID-19 test   Pertinent medical history  Glenna does not get yeast infections when she takes antibiotics.   Glenna does not need a return to work/school note.   Weight: 260 lbs   Glenna does not smoke or use smokeless tobacco.   Weight: 260 lbs    MEDICATIONS: amlodipine-valsartan oral, gemfibrozil oral, levothyroxine oral, lisinopril oral, metoprolol succinate oral, oxybutynin chloride oral, Aspirin Low Dose oral, biotin sublingual, Calcium Citrate + D oral, ferrous sulfate-C-folic acid oral, Daily Multi-Vitamin oral, Ocuvite with Lutein oral, Tylenol Arthritis Pain oral, Move Free Ultra Triple Action (boron) oral, thiamine HCl (vitamin B1) oral, B Complex-Vitamin B12 oral, Vitamin D3 oral, ALLERGIES: Penicillins, silver  Clinician Response:  Dear Glenna,   Based on your possible exposure to COVID-19 (coronavirus), we would like to test you for this virus.&nbsp; Also, you have our condolences and sympathies on the loss of your brother-in-law.  1. Please call [phone] to schedule your visit. Explain that you were referred by [facility] to have a COVID-19 test. [facility].  The following will serve as your written order for this COVID Test, ordered by me, for the indication of suspected COVID [Z20.828]: The test will be ordered in [employer], our electronic health record, after you are scheduled. It will show as ordered and authorized by Kvng Geller MD.  Order: COVID-19 (coronavirus) PCR for ASYMPTOMATIC EXPOSURE testing from [facility].  If you know you have had close contact with someone who tested positive, you should be quarantined for 14 days after this exposure. You should stay in quarantine for the14 days even if the covid test is negative, the optimal time to test after exposure is 5-7 days from the exposure  Quarantine means   What should  I do?  For safety, it's very important to follow these rules. Do this for 14 days after the date you were last exposed to the virus..  Stay home and away from others. Don't go to school or anywhere else. Generally quarantine means staying home from work but there are some exceptions to this. Please contact your workplace.   No hugging, kissing or shaking hands.  Don't let anyone visit.  Cover your mouth and nose with a mask, tissue or washcloth to avoid spreading germs.  Wash your hands and face often. Use soap and water.  What are the symptoms of COVID-19?  The most common symptoms are cough, fever and trouble breathing. Less common symptoms include headache, body aches, fatigue (feeling very tired), chills, sore throat, stuffy or runny nose, diarrhea (loose poop), loss of taste or smell, belly pain, and nausea or vomiting (feeling sick to your stomach or throwing up).  After 14 days, if you have still don't have symptoms, you likely don't have this virus.  If you develop symptoms, follow these guidelines.  If you're normally healthy: Please start another [facility] visit to report your symptoms. Go to [email or web address].  If you have a serious health problem (like cancer, heart failure, an organ transplant or kidney disease): Call your specialty clinic. Let them know that you might have COVID-19.  2. When it's time for your COVID test:  Stay at least 6 feet away from others. (If someone will drive you to your test, stay in the backseat, as far away from the  as you can.)  Cover your mouth and nose with a mask, tissue or washcloth.  Go straight to the testing site. Don't make any stops on the way there or back.  Please note  Caregivers in these groups are at risk for severe illness due to COVID-19:  o People 65 years and older  o People who live in a nursing home or long-term care facility  o People with chronic disease (lung, heart, cancer, diabetes, kidney, liver, immunologic)  o People who have a weakened immune  system, including those who:  Are in cancer treatment  Take medicine that weakens the immune system, such as corticosteroids  Had a bone marrow or organ transplant  Have an immune deficiency  Have poorly controlled HIV or AIDS  Are obese (body mass index of 40 or higher)  Smoke regularly  Where can I get more information?   [employer] [facility] -- About COVID-19: [email or web address]  CDC -- What to Do If You're Sick: www.cdc.gov/coronavirus/2019-ncov/about/steps-when-sick.html  CDC -- Ending Home Isolation: www.cdc.gov/coronavirus/2019-ncov/hcp/disposition-in-home-patients.html  [facility] -- Caring for Someone: www.cdc.gov/coronavirus/2019-ncov/if-you-are-sick/care-for-someone.html  [facility] -- Interim Guidance for Hospital Discharge to Home: www.health.[facility].[place]./diseases/coronavirus/hcp/hospdischarge.pdf  [facility] clinical trials (COVID-19 research studies): [email or web address]  Below are the COVID-19 hotlines at the [place] Department of Health ([facility]). Interpreters are available.  For health questions: Call 646-845-8717 or 1-820.788.1209 (7 a.m. to 7 p.m.)  For questions about schools and childcare: Call 379-347-0526 or 1-835.245.5615 (7 a.m. to 7 p.m.)    Diagnosis: Contact with and (suspected) exposure to other viral communicable diseases  Diagnosis ICD: Z20.828

## 2020-09-14 ENCOUNTER — COMMUNICATION - HEALTHEAST (OUTPATIENT)
Dept: SCHEDULING | Facility: CLINIC | Age: 70
End: 2020-09-14

## 2020-11-15 ENCOUNTER — COMMUNICATION - HEALTHEAST (OUTPATIENT)
Dept: FAMILY MEDICINE | Facility: CLINIC | Age: 70
End: 2020-11-15

## 2020-11-15 DIAGNOSIS — N32.81 OVERACTIVE BLADDER: ICD-10-CM

## 2020-11-29 ENCOUNTER — COMMUNICATION - HEALTHEAST (OUTPATIENT)
Dept: FAMILY MEDICINE | Facility: CLINIC | Age: 70
End: 2020-11-29

## 2020-11-29 DIAGNOSIS — E78.5 DYSLIPIDEMIA: ICD-10-CM

## 2020-11-29 DIAGNOSIS — E11.9 TYPE 2 DIABETES MELLITUS WITHOUT COMPLICATION, WITHOUT LONG-TERM CURRENT USE OF INSULIN (H): ICD-10-CM

## 2020-11-29 DIAGNOSIS — E03.9 HYPOTHYROIDISM, UNSPECIFIED TYPE: ICD-10-CM

## 2020-12-05 ENCOUNTER — COMMUNICATION - HEALTHEAST (OUTPATIENT)
Dept: FAMILY MEDICINE | Facility: CLINIC | Age: 70
End: 2020-12-05

## 2020-12-07 ENCOUNTER — AMBULATORY - HEALTHEAST (OUTPATIENT)
Dept: LAB | Facility: CLINIC | Age: 70
End: 2020-12-07

## 2020-12-07 ENCOUNTER — COMMUNICATION - HEALTHEAST (OUTPATIENT)
Dept: FAMILY MEDICINE | Facility: CLINIC | Age: 70
End: 2020-12-07

## 2020-12-07 ENCOUNTER — AMBULATORY - HEALTHEAST (OUTPATIENT)
Dept: NURSING | Facility: CLINIC | Age: 70
End: 2020-12-07

## 2020-12-07 DIAGNOSIS — I10 ESSENTIAL HYPERTENSION: ICD-10-CM

## 2020-12-07 DIAGNOSIS — E11.9 TYPE 2 DIABETES MELLITUS WITHOUT COMPLICATION, WITHOUT LONG-TERM CURRENT USE OF INSULIN (H): ICD-10-CM

## 2020-12-07 DIAGNOSIS — E03.9 HYPOTHYROIDISM, UNSPECIFIED TYPE: ICD-10-CM

## 2020-12-07 LAB
ALBUMIN SERPL-MCNC: 4 G/DL (ref 3.5–5)
ALP SERPL-CCNC: 137 U/L (ref 45–120)
ALT SERPL W P-5'-P-CCNC: 25 U/L (ref 0–45)
ANION GAP SERPL CALCULATED.3IONS-SCNC: 10 MMOL/L (ref 5–18)
AST SERPL W P-5'-P-CCNC: 24 U/L (ref 0–40)
BASOPHILS # BLD AUTO: 0.1 THOU/UL (ref 0–0.2)
BASOPHILS NFR BLD AUTO: 1 % (ref 0–2)
BILIRUB SERPL-MCNC: 0.3 MG/DL (ref 0–1)
BUN SERPL-MCNC: 15 MG/DL (ref 8–22)
CALCIUM SERPL-MCNC: 9.5 MG/DL (ref 8.5–10.5)
CHLORIDE BLD-SCNC: 108 MMOL/L (ref 98–107)
CHOLEST SERPL-MCNC: 157 MG/DL
CO2 SERPL-SCNC: 24 MMOL/L (ref 22–31)
CREAT SERPL-MCNC: 0.68 MG/DL (ref 0.6–1.1)
EOSINOPHIL # BLD AUTO: 0.4 THOU/UL (ref 0–0.4)
EOSINOPHIL NFR BLD AUTO: 3 % (ref 0–6)
ERYTHROCYTE [DISTWIDTH] IN BLOOD BY AUTOMATED COUNT: 13.3 % (ref 11–14.5)
FASTING STATUS PATIENT QL REPORTED: NO
GFR SERPL CREATININE-BSD FRML MDRD: >60 ML/MIN/1.73M2
GLUCOSE BLD-MCNC: 84 MG/DL (ref 70–125)
HBA1C MFR BLD: 5.3 %
HCT VFR BLD AUTO: 42.2 % (ref 35–47)
HDLC SERPL-MCNC: 42 MG/DL
HGB BLD-MCNC: 13.7 G/DL (ref 12–16)
IMM GRANULOCYTES # BLD: 0 THOU/UL
IMM GRANULOCYTES NFR BLD: 0 %
LDLC SERPL CALC-MCNC: 79 MG/DL
LYMPHOCYTES # BLD AUTO: 1.2 THOU/UL (ref 0.8–4.4)
LYMPHOCYTES NFR BLD AUTO: 10 % (ref 20–40)
MCH RBC QN AUTO: 29 PG (ref 27–34)
MCHC RBC AUTO-ENTMCNC: 32.5 G/DL (ref 32–36)
MCV RBC AUTO: 89 FL (ref 80–100)
MONOCYTES # BLD AUTO: 1.1 THOU/UL (ref 0–0.9)
MONOCYTES NFR BLD AUTO: 9 % (ref 2–10)
NEUTROPHILS # BLD AUTO: 9 THOU/UL (ref 2–7.7)
NEUTROPHILS NFR BLD AUTO: 77 % (ref 50–70)
PLATELET # BLD AUTO: 291 THOU/UL (ref 140–440)
PMV BLD AUTO: 10.3 FL (ref 8.5–12.5)
POTASSIUM BLD-SCNC: 4.7 MMOL/L (ref 3.5–5)
PROT SERPL-MCNC: 7.8 G/DL (ref 6–8)
RBC # BLD AUTO: 4.72 MILL/UL (ref 3.8–5.4)
SODIUM SERPL-SCNC: 142 MMOL/L (ref 136–145)
TRIGL SERPL-MCNC: 179 MG/DL
TSH SERPL DL<=0.005 MIU/L-ACNC: 0.35 UIU/ML (ref 0.3–5)
WBC: 11.7 THOU/UL (ref 4–11)

## 2020-12-11 ENCOUNTER — AMBULATORY - HEALTHEAST (OUTPATIENT)
Dept: LAB | Facility: CLINIC | Age: 70
End: 2020-12-11

## 2020-12-11 DIAGNOSIS — E11.9 TYPE 2 DIABETES MELLITUS WITHOUT COMPLICATION, WITHOUT LONG-TERM CURRENT USE OF INSULIN (H): ICD-10-CM

## 2020-12-11 LAB
CREAT UR-MCNC: 28.6 MG/DL
MICROALBUMIN UR-MCNC: <0.5 MG/DL (ref 0–1.99)
MICROALBUMIN/CREAT UR: NORMAL MG/G{CREAT}

## 2020-12-18 ENCOUNTER — COMMUNICATION - HEALTHEAST (OUTPATIENT)
Dept: FAMILY MEDICINE | Facility: CLINIC | Age: 70
End: 2020-12-18

## 2020-12-18 DIAGNOSIS — E03.9 HYPOTHYROIDISM, UNSPECIFIED TYPE: ICD-10-CM

## 2021-01-21 ENCOUNTER — OFFICE VISIT - HEALTHEAST (OUTPATIENT)
Dept: FAMILY MEDICINE | Facility: CLINIC | Age: 71
End: 2021-01-21

## 2021-01-21 DIAGNOSIS — Z00.00 ROUTINE GENERAL MEDICAL EXAMINATION AT A HEALTH CARE FACILITY: ICD-10-CM

## 2021-01-21 DIAGNOSIS — E11.9 TYPE 2 DIABETES MELLITUS WITHOUT COMPLICATION, WITHOUT LONG-TERM CURRENT USE OF INSULIN (H): ICD-10-CM

## 2021-01-21 DIAGNOSIS — E03.9 HYPOTHYROIDISM, UNSPECIFIED TYPE: ICD-10-CM

## 2021-01-21 DIAGNOSIS — E21.3 HYPERPARATHYROIDISM (H): ICD-10-CM

## 2021-01-21 DIAGNOSIS — I10 ESSENTIAL HYPERTENSION: ICD-10-CM

## 2021-01-21 DIAGNOSIS — E55.9 VITAMIN D DEFICIENCY: ICD-10-CM

## 2021-01-21 DIAGNOSIS — M81.0 AGE-RELATED OSTEOPOROSIS WITHOUT CURRENT PATHOLOGICAL FRACTURE: ICD-10-CM

## 2021-01-21 DIAGNOSIS — E78.5 DYSLIPIDEMIA: ICD-10-CM

## 2021-01-21 DIAGNOSIS — Z12.31 ENCOUNTER FOR SCREENING MAMMOGRAM FOR MALIGNANT NEOPLASM OF BREAST: ICD-10-CM

## 2021-01-21 DIAGNOSIS — N32.81 OVERACTIVE BLADDER: ICD-10-CM

## 2021-01-21 DIAGNOSIS — D12.6 ADENOMATOUS POLYP OF COLON, UNSPECIFIED PART OF COLON: ICD-10-CM

## 2021-01-21 RX ORDER — LEVOTHYROXINE SODIUM 150 UG/1
150 TABLET ORAL DAILY
Qty: 90 TABLET | Refills: 1 | Status: SHIPPED | OUTPATIENT
Start: 2021-01-21 | End: 2021-12-13

## 2021-01-21 RX ORDER — GEMFIBROZIL 600 MG/1
600 TABLET, FILM COATED ORAL 2 TIMES DAILY
Qty: 180 TABLET | Refills: 3 | Status: SHIPPED | OUTPATIENT
Start: 2021-01-21 | End: 2021-12-13

## 2021-01-21 RX ORDER — AMLODIPINE BESYLATE 10 MG/1
10 TABLET ORAL DAILY
Qty: 90 TABLET | Refills: 2 | Status: SHIPPED | OUTPATIENT
Start: 2021-01-21 | End: 2021-12-13

## 2021-01-21 RX ORDER — METOPROLOL SUCCINATE 50 MG/1
75 TABLET, EXTENDED RELEASE ORAL DAILY
Qty: 135 TABLET | Refills: 3 | Status: SHIPPED | OUTPATIENT
Start: 2021-01-21 | End: 2022-02-07

## 2021-01-21 RX ORDER — LISINOPRIL 20 MG/1
20 TABLET ORAL 2 TIMES DAILY
Qty: 180 TABLET | Refills: 3 | Status: SHIPPED | OUTPATIENT
Start: 2021-01-21 | End: 2022-05-02

## 2021-01-21 ASSESSMENT — MIFFLIN-ST. JEOR: SCORE: 1748.32

## 2021-01-21 ASSESSMENT — PATIENT HEALTH QUESTIONNAIRE - PHQ9: SUM OF ALL RESPONSES TO PHQ QUESTIONS 1-9: 2

## 2021-02-10 ENCOUNTER — AMBULATORY - HEALTHEAST (OUTPATIENT)
Dept: SCHEDULING | Facility: CLINIC | Age: 71
End: 2021-02-10

## 2021-02-10 DIAGNOSIS — Z12.31 ENCOUNTER FOR SCREENING MAMMOGRAM FOR MALIGNANT NEOPLASM OF BREAST: ICD-10-CM

## 2021-03-01 ENCOUNTER — COMMUNICATION - HEALTHEAST (OUTPATIENT)
Dept: FAMILY MEDICINE | Facility: CLINIC | Age: 71
End: 2021-03-01

## 2021-03-05 ENCOUNTER — APPOINTMENT (OUTPATIENT)
Dept: URGENT CARE | Facility: CLINIC | Age: 71
End: 2021-03-05
Payer: COMMERCIAL

## 2021-03-05 ENCOUNTER — COMMUNICATION - HEALTHEAST (OUTPATIENT)
Dept: SCHEDULING | Facility: CLINIC | Age: 71
End: 2021-03-05

## 2021-03-07 ENCOUNTER — HEALTH MAINTENANCE LETTER (OUTPATIENT)
Age: 71
End: 2021-03-07

## 2021-03-10 ENCOUNTER — AMBULATORY - HEALTHEAST (OUTPATIENT)
Dept: NURSING | Facility: CLINIC | Age: 71
End: 2021-03-10

## 2021-03-31 ENCOUNTER — AMBULATORY - HEALTHEAST (OUTPATIENT)
Dept: NURSING | Facility: CLINIC | Age: 71
End: 2021-03-31

## 2021-05-24 ENCOUNTER — RECORDS - HEALTHEAST (OUTPATIENT)
Dept: ADMINISTRATIVE | Facility: CLINIC | Age: 71
End: 2021-05-24

## 2021-05-25 ENCOUNTER — RECORDS - HEALTHEAST (OUTPATIENT)
Dept: ADMINISTRATIVE | Facility: CLINIC | Age: 71
End: 2021-05-25

## 2021-05-26 ENCOUNTER — RECORDS - HEALTHEAST (OUTPATIENT)
Dept: ADMINISTRATIVE | Facility: CLINIC | Age: 71
End: 2021-05-26

## 2021-05-26 VITALS
DIASTOLIC BLOOD PRESSURE: 62 MMHG | OXYGEN SATURATION: 98 % | HEART RATE: 64 BPM | RESPIRATION RATE: 20 BRPM | SYSTOLIC BLOOD PRESSURE: 128 MMHG | TEMPERATURE: 98.9 F

## 2021-05-27 ENCOUNTER — RECORDS - HEALTHEAST (OUTPATIENT)
Dept: ADMINISTRATIVE | Facility: CLINIC | Age: 71
End: 2021-05-27

## 2021-05-27 VITALS
TEMPERATURE: 99.1 F | HEART RATE: 67 BPM | RESPIRATION RATE: 21 BRPM | SYSTOLIC BLOOD PRESSURE: 148 MMHG | DIASTOLIC BLOOD PRESSURE: 78 MMHG | OXYGEN SATURATION: 96 %

## 2021-05-27 VITALS — SYSTOLIC BLOOD PRESSURE: 157 MMHG | HEART RATE: 69 BPM | DIASTOLIC BLOOD PRESSURE: 74 MMHG

## 2021-05-27 VITALS
HEART RATE: 60 BPM | OXYGEN SATURATION: 97 % | TEMPERATURE: 98.8 F | DIASTOLIC BLOOD PRESSURE: 90 MMHG | SYSTOLIC BLOOD PRESSURE: 159 MMHG | RESPIRATION RATE: 18 BRPM

## 2021-05-27 ASSESSMENT — PATIENT HEALTH QUESTIONNAIRE - PHQ9
SUM OF ALL RESPONSES TO PHQ QUESTIONS 1-9: 2
SUM OF ALL RESPONSES TO PHQ QUESTIONS 1-9: 1

## 2021-05-27 NOTE — TELEPHONE ENCOUNTER
Refill Approved    Rx renewed per Medication Renewal Policy. Medication was last renewed on 3/22/18.    Chanda Armas, [facility] Triage/Med Refill 4/9/2019     Requested Prescriptions   Pending Prescriptions Disp Refills     metoprolol succinate (TOPROL-XL) 50 MG 24 hr tablet [Pharmacy Med Name: Metoprolol Succinate ER Oral Tablet Extended Release 24 Hour 50 MG] 45 tablet 10     Sig: TAKE 1 & 1/2 TABLETS BY MOUTH DAILY       Beta-Blockers Refill Protocol Passed - 4/7/2019  7:34 PM        Passed - PCP or prescribing provider visit in past 12 months or next 3 months     Last office visit with prescriber/PCP: 7/16/2018 Carey Townsend MD OR same dept: 7/16/2018 Carey Townsend MD OR same specialty: 7/16/2018 Carey Townsend MD  Last physical: Visit date not found Last MTM visit: Visit date not found   Next visit within 3 mo: Visit date not found  Next physical within 3 mo: Visit date not found  Prescriber OR PCP: Carey Twonsend MD  Last diagnosis associated with med order: 1. Essential hypertension  - metoprolol succinate (TOPROL-XL) 50 MG 24 hr tablet [Pharmacy Med Name: Metoprolol Succinate ER Oral Tablet Extended Release 24 Hour 50 MG]; TAKE 1 & 1/2 TABLETS BY MOUTH DAILY  Dispense: 45 tablet; Refill: 10    If protocol passes may refill for 12 months if within 3 months of last provider visit (or a total of 15 months).             Passed - Blood pressure filed in past 12 months     BP Readings from Last 1 Encounters:   11/21/18 136/68

## 2021-05-28 NOTE — TELEPHONE ENCOUNTER
Refill Approved    Rx renewed per Medication Renewal Policy. Medication was last renewed on 4/3/18.    Chanda Armas, [facility] Triage/Med Refill 4/29/2019     Requested Prescriptions   Pending Prescriptions Disp Refills     levothyroxine (SYNTHROID, LEVOTHROID) 150 MCG tablet [Pharmacy Med Name: Levothyroxine Sodium Oral Tablet 150 MCG] 30 tablet 0     Sig: TAKE ONE TABLET BY MOUTH ONE TIME DAILY       Thyroid Hormones Protocol Passed - 4/28/2019 11:41 AM        Passed - Provider visit in past 12 months or next 3 months     Last office visit with prescriber/PCP: 7/16/2018 Carey Townsend MD OR same dept: 7/16/2018 Carey Townsend MD OR same specialty: 7/16/2018 Carey Townsend MD  Last physical: Visit date not found Last MTM visit: Visit date not found   Next visit within 3 mo: Visit date not found  Next physical within 3 mo: Visit date not found  Prescriber OR PCP: Carey Townsend MD  Last diagnosis associated with med order: There are no diagnoses linked to this encounter.  If protocol passes may refill for 12 months if within 3 months of last provider visit (or a total of 15 months).             Passed - TSH on file in past 12 months for patient age 12 & older     TSH   Date Value Ref Range Status   07/16/2018 0.23 (L) 0.30 - 5.00 uIU/mL Final

## 2021-05-29 NOTE — TELEPHONE ENCOUNTER
Refill Approved    Rx renewed per Medication Renewal Policy. Medication was last renewed on 3/4/19.    Chanda Armas, [facility] Triage/Med Refill 6/3/2019     Requested Prescriptions   Pending Prescriptions Disp Refills     amLODIPine (NORVASC) 10 MG tablet [Pharmacy Med Name: amLODIPine Besylate Oral Tablet 10 MG] 90 tablet 0     Sig: TAKE ONE TABLET BY MOUTH ONE TIME DAILY       Calcium-Channel Blockers Protocol Passed - 6/2/2019  8:42 AM        Passed - PCP or prescribing provider visit in past 12 months or next 3 months     Last office visit with prescriber/PCP: 7/16/2018 Carey Townsend MD OR same dept: 7/16/2018 Carey Townsend MD OR same specialty: 7/16/2018 Carey Townsend MD  Last physical: Visit date not found Last MTM visit: Visit date not found   Next visit within 3 mo: Visit date not found  Next physical within 3 mo: Visit date not found  Prescriber OR PCP: Carey Townsend MD  Last diagnosis associated with med order: 1. Essential hypertension  - amLODIPine (NORVASC) 10 MG tablet [Pharmacy Med Name: amLODIPine Besylate Oral Tablet 10 MG]; TAKE ONE TABLET BY MOUTH ONE TIME DAILY  Dispense: 90 tablet; Refill: 0    If protocol passes may refill for 12 months if within 3 months of last provider visit (or a total of 15 months).             Passed - Blood pressure filed in past 12 months     BP Readings from Last 1 Encounters:   11/21/18 136/68

## 2021-05-30 NOTE — TELEPHONE ENCOUNTER
Due to be seen    Rx renewed per Medication Renewal Policy. Medication was last renewed on 4/9/19.    Chanda Armas, [facility] Triage/Med Refill 7/1/2019     Requested Prescriptions   Pending Prescriptions Disp Refills     metoprolol succinate (TOPROL-XL) 50 MG 24 hr tablet [Pharmacy Med Name: Metoprolol Succinate ER Oral Tablet Extended Release 24 Hour 50 MG] 45 tablet 1     Sig: TAKE ONE AND ONE-HALF TABLETS BY MOUTH DAILY       Beta-Blockers Refill Protocol Passed - 6/30/2019 11:01 AM        Passed - PCP or prescribing provider visit in past 12 months or next 3 months     Last office visit with prescriber/PCP: 7/16/2018 Carey Townsend MD OR same dept: 7/16/2018 Carey Townsend MD OR same specialty: 7/16/2018 Carey Townsend MD  Last physical: Visit date not found Last MTM visit: Visit date not found   Next visit within 3 mo: Visit date not found  Next physical within 3 mo: Visit date not found  Prescriber OR PCP: Carey Townsend MD  Last diagnosis associated with med order: 1. Essential hypertension  - metoprolol succinate (TOPROL-XL) 50 MG 24 hr tablet [Pharmacy Med Name: Metoprolol Succinate ER Oral Tablet Extended Release 24 Hour 50 MG]; TAKE ONE AND ONE-HALF TABLETS BY MOUTH DAILY   Dispense: 45 tablet; Refill: 1    If protocol passes may refill for 12 months if within 3 months of last provider visit (or a total of 15 months).             Passed - Blood pressure filed in past 12 months     BP Readings from Last 1 Encounters:   11/21/18 136/68

## 2021-05-31 VITALS — WEIGHT: 252 LBS | BODY MASS INDEX: 41.93 KG/M2

## 2021-05-31 VITALS — WEIGHT: 248 LBS | BODY MASS INDEX: 41.27 KG/M2

## 2021-05-31 VITALS — HEIGHT: 65 IN | BODY MASS INDEX: 40.98 KG/M2 | WEIGHT: 246 LBS

## 2021-05-31 NOTE — TELEPHONE ENCOUNTER
Refill Approved    Rx renewed per Medication Renewal Policy. Medication was last renewed on 07/01/2019.    Sherley Willard, [facility] Connection Triage/Med Refill 9/2/2019     Requested Prescriptions   Pending Prescriptions Disp Refills     metoprolol succinate (TOPROL-XL) 50 MG 24 hr tablet [Pharmacy Med Name: Metoprolol Succinate ER Oral Tablet Extended Release 24 Hour 50 MG] 45 tablet 0     Sig: TAKE ONE AND ONE-HALF TABLETS BY MOUTH DAILY       Beta-Blockers Refill Protocol Passed - 9/1/2019  6:38 PM        Passed - PCP or prescribing provider visit in past 12 months or next 3 months     Last office visit with prescriber/PCP: Visit date not found OR same dept: Visit date not found OR same specialty: 7/16/2018 Carey Townsend MD  Last physical: Visit date not found Last MTM visit: Visit date not found   Next visit within 3 mo: Visit date not found  Next physical within 3 mo: Visit date not found  Prescriber OR PCP: Caty Kaur MD  Last diagnosis associated with med order: 1. Essential hypertension  - metoprolol succinate (TOPROL-XL) 50 MG 24 hr tablet [Pharmacy Med Name: Metoprolol Succinate ER Oral Tablet Extended Release 24 Hour 50 MG]; TAKE ONE AND ONE-HALF TABLETS BY MOUTH DAILY  Dispense: 45 tablet; Refill: 0    If protocol passes may refill for 12 months if within 3 months of last provider visit (or a total of 15 months).             Passed - Blood pressure filed in past 12 months     BP Readings from Last 1 Encounters:   11/21/18 136/68

## 2021-05-31 NOTE — TELEPHONE ENCOUNTER
RN cannot approve Refill Request    RN can NOT refill this medication PCP messaged that patient is overdue for Labs and Office Visit and Protocol failed and RN gave three month refill. Last office visit: 7/16/2018 Carey Townsend MD Last Physical: Visit date not found Last MTM visit: Visit date not found Last visit same specialty: 7/16/2018 Carey Townsend MD.  Next visit within 3 mo: Visit date not found  Next physical within 3 mo: Visit date not found  Last OV 7/16/2018  Last refill 11/13/2018 for 180/2    Rebeca Rincon, [facility] Triage/Med Refill 8/18/2019    Requested Prescriptions   Pending Prescriptions Disp Refills     gemfibrozil (LOPID) 600 MG tablet [Pharmacy Med Name: Gemfibrozil Oral Tablet 600 MG] 180 tablet 1     Sig: TAKE ONE TABLET BY MOUTH TWICE DAILY       Gemfibrozil Refill Protocol Failed - 8/17/2019 11:53 PM        Failed - Fasting lipid cascade in last 12 months     Cholesterol   Date Value Ref Range Status   11/15/2017 175 <=199 mg/dL Final     Triglycerides   Date Value Ref Range Status   11/15/2017 73 <=149 mg/dL Final     HDL Cholesterol   Date Value Ref Range Status   11/15/2017 52 >=50 mg/dL Final     LDL Calculated   Date Value Ref Range Status   11/15/2017 108 <=129 mg/dL Final     Patient Fasting > 8hrs?   Date Value Ref Range Status   11/15/2017 Yes  Final             Failed - AST or ALT in last 12 months     AST   Date Value Ref Range Status   11/15/2017 21 0 - 40 U/L Final     ALT   Date Value Ref Range Status   11/15/2017 23 0 - 45 U/L Final               Failed - PCP or prescribing provider visit in last 12 months       Last office visit with prescriber/PCP: 7/16/2018 Carey Townsend MD OR same dept: Visit date not found OR same specialty: 7/16/2018 Carey Townsend MD  Last physical: Visit date not found Last MTM visit: Visit date not found   Next visit within 3 mo: Visit date not found  Next physical within 3 mo: Visit date not found  Prescriber OR  PCP: Carey Townsend MD  Last diagnosis associated with med order: 1. Dyslipidemia  - gemfibrozil (LOPID) 600 MG tablet [Pharmacy Med Name: Gemfibrozil Oral Tablet 600 MG]; TAKE ONE TABLET BY MOUTH TWICE DAILY   Dispense: 180 tablet; Refill: 1    If protocol passes may refill for 12 months if within 3 months of last provider visit (or a total of 15 months).

## 2021-05-31 NOTE — TELEPHONE ENCOUNTER
RN cannot approve Refill Request    RN can NOT refill this medication PCP messaged that patient is overdue for Office Visit. Last office visit: 7/16/2018 Carey Townsend MD Last Physical: Visit date not found Last MTM visit: Visit date not found Last visit same specialty: 7/16/2018 Carey Townsend MD.  Next visit within 3 mo: Visit date not found  Next physical within 3 mo: Visit date not found      Chuy Quintana, [facility] Triage/Med Refill 9/1/2019    Requested Prescriptions   Pending Prescriptions Disp Refills     amLODIPine (NORVASC) 10 MG tablet [Pharmacy Med Name: amLODIPine Besylate Oral Tablet 10 MG] 90 tablet 0     Sig: TAKE ONE TABLET BY MOUTH ONE TIME DAILY       Calcium-Channel Blockers Protocol Failed - 9/1/2019  6:38 PM        Failed - PCP or prescribing provider visit in past 12 months or next 3 months     Last office visit with prescriber/PCP: 7/16/2018 Carey Townsend MD OR same dept: Visit date not found OR same specialty: 7/16/2018 Carey Townsend MD  Last physical: Visit date not found Last MTM visit: Visit date not found   Next visit within 3 mo: Visit date not found  Next physical within 3 mo: Visit date not found  Prescriber OR PCP: Carey Townsend MD  Last diagnosis associated with med order: 1. Essential hypertension  - amLODIPine (NORVASC) 10 MG tablet [Pharmacy Med Name: amLODIPine Besylate Oral Tablet 10 MG]; TAKE ONE TABLET BY MOUTH ONE TIME DAILY   Dispense: 90 tablet; Refill: 0    If protocol passes may refill for 12 months if within 3 months of last provider visit (or a total of 15 months).             Passed - Blood pressure filed in past 12 months     BP Readings from Last 1 Encounters:   11/21/18 136/68

## 2021-06-01 ENCOUNTER — RECORDS - HEALTHEAST (OUTPATIENT)
Dept: ADMINISTRATIVE | Facility: CLINIC | Age: 71
End: 2021-06-01

## 2021-06-01 VITALS — HEIGHT: 65 IN | WEIGHT: 278.8 LBS | BODY MASS INDEX: 46.45 KG/M2

## 2021-06-01 VITALS — BODY MASS INDEX: 43.27 KG/M2 | WEIGHT: 260 LBS

## 2021-06-01 VITALS — BODY MASS INDEX: 42.93 KG/M2 | WEIGHT: 258 LBS

## 2021-06-01 VITALS — BODY MASS INDEX: 42.43 KG/M2 | WEIGHT: 255 LBS

## 2021-06-01 NOTE — TELEPHONE ENCOUNTER
RN cannot approve Refill Request    RN can NOT refill this medication Protocol failed and NO refill given      Chanda Armas, [facility] Triage/Med Refill 9/27/2019    Requested Prescriptions   Pending Prescriptions Disp Refills     levothyroxine (SYNTHROID, LEVOTHROID) 150 MCG tablet [Pharmacy Med Name: Levothyroxine Sodium Oral Tablet 150 MCG] 90 tablet 3     Sig: TAKE ONE TABLET BY MOUTH ONE TIME DAILY       Thyroid Hormones Protocol Failed - 9/27/2019 12:47 AM        Failed - Provider visit in past 12 months or next 3 months     Last office visit with prescriber/PCP: 7/16/2018 Carey Townsend MD OR same dept: Visit date not found OR same specialty: 7/16/2018 Carey Townsend MD  Last physical: Visit date not found Last MTM visit: Visit date not found   Next visit within 3 mo: Visit date not found  Next physical within 3 mo: Visit date not found  Prescriber OR PCP: Carey Townsend MD  Last diagnosis associated with med order: 1. Hypothyroidism, unspecified type  - levothyroxine (SYNTHROID, LEVOTHROID) 150 MCG tablet [Pharmacy Med Name: Levothyroxine Sodium Oral Tablet 150 MCG]; TAKE ONE TABLET BY MOUTH ONE TIME DAILY   Dispense: 30 tablet; Refill: 1    If protocol passes may refill for 12 months if within 3 months of last provider visit (or a total of 15 months).             Failed - TSH on file in past 12 months for patient age 12 & older     TSH   Date Value Ref Range Status   07/16/2018 0.23 (L) 0.30 - 5.00 uIU/mL Final

## 2021-06-01 NOTE — TELEPHONE ENCOUNTER
Refill NOT Approved  Refill NOT Given> 15 months since last office visit  Rx renewed per Medication Renewal Policy. Medication was last renewed on 9/2/19.    Gillian Qureshi, [facility] Connection Triage/Med Refill 9/29/2019     Requested Prescriptions   Pending Prescriptions Disp Refills     metoprolol succinate (TOPROL-XL) 50 MG 24 hr tablet [Pharmacy Med Name: Metoprolol Succinate ER Oral Tablet Extended Release 24 Hour 50 MG] 45 tablet 0     Sig: TAKE ONE AND ONE-HALF TABLETS BY MOUTH DAILY       Beta-Blockers Refill Protocol Passed - 9/29/2019 12:43 PM        Passed - PCP or prescribing provider visit in past 12 months or next 3 months     Last office visit with prescriber/PCP: Visit date not found OR same dept: Visit date not found OR same specialty: 7/16/2018 Carey Townsend MD  Last physical: Visit date not found Last MTM visit: Visit date not found   Next visit within 3 mo: Visit date not found  Next physical within 3 mo: Visit date not found  Prescriber OR PCP: Caty Kaur MD  Last diagnosis associated with med order: 1. Essential hypertension  - metoprolol succinate (TOPROL-XL) 50 MG 24 hr tablet [Pharmacy Med Name: Metoprolol Succinate ER Oral Tablet Extended Release 24 Hour 50 MG]; TAKE ONE AND ONE-HALF TABLETS BY MOUTH DAILY   Dispense: 45 tablet; Refill: 0    If protocol passes may refill for 12 months if within 3 months of last provider visit (or a total of 15 months).             Passed - Blood pressure filed in past 12 months     BP Readings from Last 1 Encounters:   11/21/18 136/68

## 2021-06-02 VITALS — WEIGHT: 265.3 LBS | BODY MASS INDEX: 44.15 KG/M2

## 2021-06-02 NOTE — TELEPHONE ENCOUNTER
5 yrs 4 mos post op lab orders placed for patient in preparation for appointment with  in late October.    June Henriquez RN, N  [facility]  P [phone]  F [phone]

## 2021-06-02 NOTE — TELEPHONE ENCOUNTER
Pt Glenna Estrada 1950 ph [phone] Please enter order for labs to be done at the [facility]

## 2021-06-02 NOTE — PROGRESS NOTES
Family Medicine Office Visit  [facility]  Patient Name: Glenna Estrada  Patient Age: 69 y.o.  YOB: 1950  MRN: [id number]    Date of Visit: 10/28/2019  Reason for Office Visit:   Chief Complaint   Patient presents with     Establish Care     looking for new PCP-  mammo and cologuard- declined flu and Td vaccines for now            Assessment / Plan / Medical Decision Makin. Visit for screening mammogram  - Mammo Screening Bilateral; Future    2. Screen for colon cancer  Will do cologuard and call with the results  - Cologuard    3. Body mass index (BMI) 45.0-49.9, adult (H)  Encouraged healthy diet and exercise    4. Hyperparathyroidism (H)  Seeing endocrinology and may want a provider closer but will call when she decides    5. Type 2 diabetes mellitus without complication, without long-term current use of insulin (H)  Routine labs today and will call with the results.  Continue to monitor sugars    6. Postmenopausal status  - DXA Bone Density Scan; Future  - DXA Bone Density Scan    7. Essential hypertension  Refill medication - stable  - amLODIPine (NORVASC) 10 MG tablet; Take 1 tablet (10 mg total) by mouth daily.  Dispense: 90 tablet; Refill: 0  - lisinopril (PRINIVIL,ZESTRIL) 20 MG tablet; Take 1 tablet (20 mg total) by mouth 2 (two) times a day.  Dispense: 180 tablet; Refill: 2  - metoprolol succinate (TOPROL-XL) 50 MG 24 hr tablet; Take 1.5 tablets (75 mg total) by mouth daily.  Dispense: 135 tablet; Refill: 2    8. Dyslipidemia  Stable - refill medications  - gemfibrozil (LOPID) 600 MG tablet; Take 1 tablet (600 mg total) by mouth 2 (two) times a day. You are overdue for lab work  Dispense: 180 tablet; Refill: 0    9. Hypothyroidism, unspecified type  Stable - refill medications  - levothyroxine (SYNTHROID, LEVOTHROID) 150 MCG tablet; Take 1 tablet (150 mcg total) by mouth daily.  Dispense: 90 tablet; Refill: 3    10. Overactive bladder  Stable -  refills  - oxybutynin (DITROPAN) 5 MG tablet; Take 1 tablet (5 mg total) by mouth 2 (two) times a day.  Dispense: 180 tablet; Refill: 2    11. S/P bariatric surgery  Routine labs.  - Vitamin A (Retinol), Serum or Plasma  - Folate, Serum  - Zinc, Serum or Plasma  - Vitamin D, Total (25-Hydroxy)  - Parathyroid Hormone Intact  - HM2(CBC w/o Differential)  - Vitamin B12  - Ferritin  - Comprehensive Metabolic Panel  - Lipid Profile  - Glycosylated Hemoglobin A1c  - Vitamin B1 (Thiamine), Whole Blood (VIT B1 WB)    12. Intestinal malabsorption, unspecified type  - Vitamin A (Retinol), Serum or Plasma  - Folate, Serum  - Zinc, Serum or Plasma  - Vitamin D, Total (25-Hydroxy)  - Parathyroid Hormone Intact  - HM2(CBC w/o Differential)  - Vitamin B12  - Ferritin  - Comprehensive Metabolic Panel  - Lipid Profile  - Glycosylated Hemoglobin A1c  - Vitamin B1 (Thiamine), Whole Blood (VIT B1 WB)    13. Postoperative malabsorption  S/p gastric bypass and will check labs  - Vitamin A (Retinol), Serum or Plasma  - Folate, Serum  - Zinc, Serum or Plasma  - Vitamin D, Total (25-Hydroxy)  - Parathyroid Hormone Intact  - HM2(CBC w/o Differential)  - Vitamin B12  - Ferritin  - Comprehensive Metabolic Panel  - Lipid Profile  - Glycosylated Hemoglobin A1c  - Vitamin B1 (Thiamine), Whole Blood (VIT B1 WB)        Health Maintenance Review  Health Maintenance   Topic Date Due     HEPATITIS C SCREENING  1950     DIABETES FOOT EXAM  08/20/1961     DIABETES OPHTHALMOLOGY EXAM  08/20/1961     ZOSTER VACCINES (1 of 2) 08/20/2001     ADVANCE CARE PLANNING  03/29/2016     PNEUMOCOCCAL IMMUNIZATION 65+ LOW/MEDIUM RISK (1 of 2 - PCV13) 08/20/2016     DIABETES FOLLOW-UP  09/22/2018     TD 18+ HE  09/29/2018     COLONOSCOPY  11/05/2018     FALL RISK ASSESSMENT  12/18/2018     DIABETES HEMOGLOBIN A1C  01/16/2019     MEDICARE ANNUAL WELLNESS VISIT  03/22/2019     DIABETES URINE MICROALBUMIN  03/22/2019     INFLUENZA VACCINE RULE BASED (1) 08/01/2019      MAMMOGRAM  01/19/2020     DXA SCAN  01/19/2020         I have changed Glenna Estrada's amLODIPine, levothyroxine, lisinopril, metoprolol succinate, and oxybutynin. I am also having her maintain her multivitamin with minerals, ferrous sulfate, folic acid, glucosamine-chondroitin, (vit A,C and E-lutein-minerals), cyanocobalamin (vitamin B-12), ascorbic acid (vitamin C), cholecalciferol (vitamin D3), calcium citrate-vitamin D, acetaminophen, Lactobacillus rhamnosus GG, aspirin, and gemfibrozil.      HPI:  Glenna Estrada is a 69 y.o. year old who presents to the office today for establish care.  Doing well.  Hx of back and neck pain from time to time.  Disability with the government and dx with degenerative changes.  Hx of hypothyroid, lymphedema, DVT, dyslipidemia and hyperparathyroidism.  Hx of gastric bypass and due for labs.  Hx of mulitple vascular surgeries and seeing Dr Jenkins.  Wearing compression stockings.        Review of Systems- pertinent positive in bold:  Constitutional: Fever, chills, night sweats, fainting, weight change, fatigue, seizures, dizziness, sleeping difficulties, loud snoring/pauses in breathing  Eyes: change in vision, blurred or double vision, redness/eye pain  Ears, nose, mouth, throat: change in hearing, ear pain, hoarseness, difficulty swallowing, sores in the mouth or throat  Respiratory: shortness of breath, cough, bloody sputum, wheezing  Cardiovascular: chest pain, palpitations   Gastrointestinal: abdominal pain, heartburn/indigestion, nausea/vomiting, change in appetite, change in bowel habits, constipation or diarrhea, rectal bleeding/dark stools, difficulty swallowing  Urinary: painful urination, frequent urination, urinary urgency/incontinence, blood in urine/dark urine, nocturia  Musculoskeletal: backache/back pain (new or increasing), weakness, joint pain/stiffness (new or increasing), muscle cramps, swelling of hands, feet, ankles, leg pain/redness  Skin: change in  moles/freckles, rash, nodules  Hematologic/lymphatic: swollen lymph glands, abnormal bruising/bleeding  Endocrine: excessive thirst/urination, cold or heat intolerance  Neurologic/emotional: worrisome memory change, numbness/tingling, anxiety, mood swings      Current Scheduled Meds:  Outpatient Encounter Medications as of 10/28/2019   Medication Sig Dispense Refill     acetaminophen (ARTHRITIS PAIN RELIEF, ACETAM,) 650 MG CR tablet Take 650 mg by mouth every 8 (eight) hours as needed for pain.       amLODIPine (NORVASC) 10 MG tablet Take 1 tablet (10 mg total) by mouth daily. 90 tablet 0     ascorbic acid (ASCORBIC ACID WITH FELICE HIPS) 500 MG tablet Take 500 mg by mouth daily.       aspirin 81 MG EC tablet Take 81 mg by mouth daily.       calcium citrate-vitamin D (CITRACAL+D) 315-200 mg-unit per tablet Take 1 tablet by mouth 2 (two) times a day.       cholecalciferol, vitamin D3, 5,000 unit Tab Take 1 tablet by mouth daily.       cyanocobalamin, vitamin B-12, (VITAMIN B-12) 5,000 mcg Subl Place 5,000 mcg under the tongue daily.       ferrous sulfate 325 (65 FE) MG tablet Take 650 mg by mouth daily with breakfast.        folic acid (FOLVITE) 800 MCG tablet Take 800 mcg by mouth daily.        gemfibrozil (LOPID) 600 MG tablet Take 1 tablet (600 mg total) by mouth 2 (two) times a day. You are overdue for lab work 180 tablet 0     glucosamine-chondroitin 500-400 mg tablet Take 1 tablet by mouth daily.       Lactobacillus rhamnosus GG (CULTURELLE) 10-15 Billion cell capsule Take 1 capsule by mouth daily.       levothyroxine (SYNTHROID, LEVOTHROID) 150 MCG tablet Take 1 tablet (150 mcg total) by mouth daily. 90 tablet 3     lisinopril (PRINIVIL,ZESTRIL) 20 MG tablet Take 1 tablet (20 mg total) by mouth 2 (two) times a day. 180 tablet 2     metoprolol succinate (TOPROL-XL) 50 MG 24 hr tablet Take 1.5 tablets (75 mg total) by mouth daily. 135 tablet 2     multivitamin with minerals tablet Take 1 tablet by mouth 2 (two)  times a day.       oxybutynin (DITROPAN) 5 MG tablet Take 1 tablet (5 mg total) by mouth 2 (two) times a day. 180 tablet 2     vit A,C & E-lutein-minerals (OCUVITE WITH LUTEIN) 1,000 unit-200 mg-60 unit-2 mg Tab Take 1 tablet by mouth daily.       [DISCONTINUED] amLODIPine (NORVASC) 10 MG tablet TAKE ONE TABLET BY MOUTH ONE TIME DAILY  90 tablet 0     [DISCONTINUED] gemfibrozil (LOPID) 600 MG tablet Take 1 tablet (600 mg total) by mouth 2 (two) times a day. You are overdue for lab work 180 tablet 0     [DISCONTINUED] levothyroxine (SYNTHROID, LEVOTHROID) 150 MCG tablet TAKE ONE TABLET BY MOUTH ONE TIME DAILY  90 tablet 3     [DISCONTINUED] lisinopril (PRINIVIL,ZESTRIL) 20 MG tablet TAKE ONE TABLET BY MOUTH TWICE DAILY  60 tablet 10     [DISCONTINUED] metoprolol succinate (TOPROL-XL) 50 MG 24 hr tablet TAKE ONE AND ONE-HALF TABLETS BY MOUTH DAILY  45 tablet 0     [DISCONTINUED] oxybutynin (DITROPAN) 5 MG tablet TAKE ONE TABLET BY MOUTH TWICE DAILY  60 tablet 10     No facility-administered encounter medications on file as of 10/28/2019.      Past Medical History:   Diagnosis Date     Arthritis      Diabetes mellitus (H)      Disease of thyroid gland      Dyslipidemia      Hip pain      History of DVT (deep vein thrombosis)      Hyperparathyroidism (H)      Knee pain      Low back pain      Lymphedema      Metabolic syndrome      Morbid obesity with BMI of 50.0-59.9, adult (H)      Other and unspecified postsurgical nonabsorption      Ulcer of calf (H) 6/10/2014     Venous stasis ulcer (H)      Past Surgical History:   Procedure Laterality Date     WI LAP GASTRIC BYPASS/CIERRA-EN-Y N/A 6/9/2014    Procedure: LAPAROSCOPIC CIERRA-EN-Y GASTRIC BYPASS WITH OMENTOPEXY, POSSIBLE OPEN (Room 4710);  Surgeon: Timbo Sweet MD;  Location: [facility];  Service: General     ROTATOR CUFF REPAIR       TONSILLECTOMY AND ADENOIDECTOMY       TOTAL HIP ARTHROPLASTY Left 12/31/2014    Procedure: LEFT TOTAL HIP ARTHROPLASTY;  " Surgeon: Landon Canales MD;  Location: [facility] [place];  Service:      TUBAL LIGATION Bilateral      VASCULAR SURGERY  2014    Right leg skin graft     Social History     Tobacco Use     Smoking status: Former Smoker     Packs/day: 1.50     Years: 20.00     Pack years: 30.00     Types: Cigarettes     Last attempt to quit: 1992     Years since quittin.9     Smokeless tobacco: Never Used   Substance Use Topics     Alcohol use: Yes     Comment: rarely     Drug use: No       Objective / Physical Examination:  Vitals:    10/28/19 0818   BP: 136/74   Patient Site: Right Arm   Patient Position: Sitting   Cuff Size: Adult Regular   Pulse: 67   Temp: 97.7  F (36.5  C)   TempSrc: Oral   SpO2: 97%   Weight: (!) 266 lb 14.4 oz (121.1 kg)   Height: 5' 5\" (1.651 m)     Wt Readings from Last 3 Encounters:   10/28/19 (!) 266 lb 14.4 oz (121.1 kg)   18 (!) 265 lb 4.8 oz (120.3 kg)   18 (!) 278 lb 12.8 oz (126.5 kg)     BP Readings from Last 3 Encounters:   10/28/19 136/74   18 136/68   18 160/82     Body mass index is 44.41 kg/m .   Results for orders placed or performed in visit on 18   Culture, Urine   Result Value Ref Range    Culture 10,000-50,000 col/ml Escherichia coli (!)        Susceptibility    Escherichia coli - TAY     Amoxicillin + Clavulanate 8/4 Sensitive      Ampicillin >16 Resistant      Cefazolin 2 Sensitive      Cefepime <=1 Sensitive      Ceftriaxone <=1 Sensitive      Ciprofloxacin <=0.5 Sensitive      Gentamicin <=2 Sensitive      Levofloxacin <=1 Sensitive      Meropenem <=0.25 Sensitive      Nitrofurantoin <=16 Sensitive      Tetracycline <=2 Sensitive      Tobramycin <=2 Sensitive      Trimethoprim + Sulfamethoxazole <=0.5 Sensitive    Urinalysis-UC if Indicated   Result Value Ref Range    Color, UA Yellow Colorless, Yellow, Straw, Light Yellow    Clarity, UA Clear Clear    Glucose, UA Negative Negative    Bilirubin, UA Negative Negative    Ketones, UA " Negative Negative    Specific Gravity, UA 1.010 1.005 - 1.030    Blood, UA Trace (!) Negative    pH, UA 6.0 5.0 - 8.0    Protein, UA Negative Negative mg/dL    Urobilinogen, UA 0.2 E.U./dL 0.2 E.U./dL, 1.0 E.U./dL    Nitrite, UA Negative Negative    Leukocytes, UA Moderate (!) Negative    Bacteria, UA None Seen None Seen hpf    RBC, UA 3-5 (!) None Seen, 0-2 hpf    WBC, UA 10-25 (!) None Seen, 0-5 hpf    Squam Epithel, UA 5-10 (!) None Seen, 0-5 lpf    WBC Clumps Present (!) None Seen           General Appearance: Alert and oriented, cooperative, affect appropriate, speech clear, in no apparent distress  Head: Normocephalic, atraumatic  Ears: Tympanic membrane clear with landmarks well visualized bilaterally  Eyes: PERRL, fundi appear clear bilaterally. EOMI. Conjunctivae clear and sclerae non-icteric  Nose: Septum midline, nares patent, no visible polyps, mucosa moist and without drainage  Throat: Lips and mucosa moist. Teeth in good repair, pharynx without erythema or exudate  Neck: Supple, trachea midline. No cervical adenopathy  Back: Symmetrical and nontender  Lungs: Clear to auscultation bilaterally. Normal inspiratory and expiratory effort  Cardiovascular: Regular rate, normal S1, S2. No murmurs, rubs, or gallops  Abdomen: Bowel sounds active all four quadrants. Soft, non-tender. No hepatomegaly or splenomegaly. No bruits detected.   Extremities: Pulses 2+ and equal throughout. No edema. Strength equal throughout. Compression stockings on  Integumentary: Warm and dry. Without suspicious looking lesions  Neuro: Alert and oriented, follows commands appropriately.     Orders Placed This Encounter   Procedures     Mammo Screening Bilateral     DXA Bone Density Scan     Cologuard   Followup: No follow-ups on file. earlier if needed.    Total time spent with patient was 30 min with >50% of time spent in face-to-face counseling regarding the above plan       Jacqui Molina MD

## 2021-06-03 VITALS
DIASTOLIC BLOOD PRESSURE: 74 MMHG | OXYGEN SATURATION: 97 % | BODY MASS INDEX: 44.47 KG/M2 | WEIGHT: 266.9 LBS | SYSTOLIC BLOOD PRESSURE: 136 MMHG | HEART RATE: 67 BPM | TEMPERATURE: 97.7 F | HEIGHT: 65 IN

## 2021-06-04 VITALS — BODY MASS INDEX: 43.96 KG/M2 | HEIGHT: 66 IN | WEIGHT: 273.5 LBS

## 2021-06-04 VITALS
HEIGHT: 66 IN | BODY MASS INDEX: 43.71 KG/M2 | DIASTOLIC BLOOD PRESSURE: 80 MMHG | SYSTOLIC BLOOD PRESSURE: 146 MMHG | WEIGHT: 272 LBS

## 2021-06-04 NOTE — TELEPHONE ENCOUNTER
Called patient to inform her of provider's recommendations.  Left message for patient to call clinic back at her earliest convenience.    Mailed letter.

## 2021-06-04 NOTE — TELEPHONE ENCOUNTER
Called patient to inform her of provider's recommendations.  Left message for patient to call clinic back at her earliest convenience.

## 2021-06-04 NOTE — TELEPHONE ENCOUNTER
Called pt to notify of the positive cologuard.  No answer and left message to call back.  Positive cologuard indicates further testing is needed but not specifically cancer or anything else.  Will need a colonoscopy now to investigate further.  Please find out where she would like to go - [facility] or general surgery for the colonoscopy.

## 2021-06-05 VITALS
SYSTOLIC BLOOD PRESSURE: 130 MMHG | HEART RATE: 65 BPM | BODY MASS INDEX: 42.75 KG/M2 | DIASTOLIC BLOOD PRESSURE: 80 MMHG | OXYGEN SATURATION: 99 % | WEIGHT: 266 LBS | HEIGHT: 66 IN

## 2021-06-05 NOTE — PROGRESS NOTES
Results show osteoporosis with a high fracture risk.  Recommend either starting osteoporosis medication like fosamax or seeing osteoporosis clinic - whichever she would prefer.  Then repeat DEXA in 2 years.

## 2021-06-05 NOTE — TELEPHONE ENCOUNTER
Called and spoke with patient's daughter Eleni who will attempt to contact patient and have her call the clinic for the provider's recommendations.    Patient's daughter verbalized understanding and is agreeable with the plan of care.

## 2021-06-05 NOTE — TELEPHONE ENCOUNTER
----- Message from Jacqui Molina MD sent at 1/29/2020  6:36 AM CST -----  Results show osteoporosis with a high fracture risk.  Recommend either starting osteoporosis medication like fosamax or seeing osteoporosis clinic - whichever she would prefer.  Then repeat DEXA in 2 years.

## 2021-06-05 NOTE — TELEPHONE ENCOUNTER
Left message for pt to call back. Please relay lab results from Dr. Molina:     Results show osteoporosis with a high fracture risk.  Recommend either starting osteoporosis medication like fosamax or seeing osteoporosis clinic - whichever she would prefer.  Then repeat DEXA in 2 years.

## 2021-06-05 NOTE — TELEPHONE ENCOUNTER
Patient Returning Call    Reason for call:  lmtcb    Information relayed to patient:  Patient info of Pos findings of cologaurd and needs colonoscopy. Patient verbalize an understanding.    Patient has additional questions:  Yes     If YES, what are your questions/concerns:    Place order for colonoscopy for patient to [facility].  Okay to leave a detailed message?: Yes

## 2021-06-06 NOTE — TELEPHONE ENCOUNTER
Refill Approved    Rx renewed per Medication Renewal Policy. Medication was last renewed on 10/28/19.    Chanda Armas, [facility] Triage/Med Refill 2/24/2020     Requested Prescriptions   Pending Prescriptions Disp Refills     gemfibroziL (LOPID) 600 MG tablet [Pharmacy Med Name: Gemfibrozil Oral Tablet 600 MG] 180 tablet 0     Sig: TAKE ONE TABLET BY MOUTH TWICE DAILY       Gemfibrozil Refill Protocol Passed - 2/23/2020 12:25 AM        Passed - Fasting lipid cascade in last 12 months     Cholesterol   Date Value Ref Range Status   10/28/2019 166 <=199 mg/dL Final     Triglycerides   Date Value Ref Range Status   10/28/2019 124 <=149 mg/dL Final     HDL Cholesterol   Date Value Ref Range Status   10/28/2019 41 (L) >=50 mg/dL Final     LDL Calculated   Date Value Ref Range Status   10/28/2019 100 <=129 mg/dL Final     Patient Fasting > 8hrs?   Date Value Ref Range Status   10/28/2019 Unknown  Final             Passed - AST or ALT in last 12 months     AST   Date Value Ref Range Status   10/28/2019 18 0 - 40 U/L Final     ALT   Date Value Ref Range Status   10/28/2019 21 0 - 45 U/L Final               Passed - PCP or prescribing provider visit in last 12 months       Last office visit with prescriber/PCP: 10/28/2019 Jacqui Molina MD OR same dept: 10/28/2019 Jacqui Molina MD OR same specialty: 10/28/2019 Jacqui Molina MD  Last physical: Visit date not found Last MTM visit: Visit date not found   Next visit within 3 mo: Visit date not found  Next physical within 3 mo: Visit date not found  Prescriber OR PCP: Jacqui Molina MD  Last diagnosis associated with med order: 1. Dyslipidemia  - gemfibroziL (LOPID) 600 MG tablet [Pharmacy Med Name: Gemfibrozil Oral Tablet 600 MG]; TAKE ONE TABLET BY MOUTH TWICE DAILY   Dispense: 180 tablet; Refill: 0    2. Essential hypertension  - amLODIPine (NORVASC) 10 MG tablet [Pharmacy Med Name: amLODIPine Besylate Oral Tablet 10 MG]; TAKE ONE TABLET BY MOUTH ONE TIME DAILY    Dispense: 90 tablet; Refill: 0    If protocol passes may refill for 12 months if within 3 months of last provider visit (or a total of 15 months).             amLODIPine (NORVASC) 10 MG tablet [Pharmacy Med Name: amLODIPine Besylate Oral Tablet 10 MG] 90 tablet 0     Sig: TAKE ONE TABLET BY MOUTH ONE TIME DAILY       Calcium-Channel Blockers Protocol Passed - 2/23/2020 12:25 AM        Passed - PCP or prescribing provider visit in past 12 months or next 3 months     Last office visit with prescriber/PCP: 10/28/2019 Jacqui Molina MD OR same dept: 10/28/2019 Jacqui Molina MD OR same specialty: 10/28/2019 Jacqui Molina MD  Last physical: Visit date not found Last MTM visit: Visit date not found   Next visit within 3 mo: Visit date not found  Next physical within 3 mo: Visit date not found  Prescriber OR PCP: Jacqui Molina MD  Last diagnosis associated with med order: 1. Dyslipidemia  - gemfibroziL (LOPID) 600 MG tablet [Pharmacy Med Name: Gemfibrozil Oral Tablet 600 MG]; TAKE ONE TABLET BY MOUTH TWICE DAILY   Dispense: 180 tablet; Refill: 0    2. Essential hypertension  - amLODIPine (NORVASC) 10 MG tablet [Pharmacy Med Name: amLODIPine Besylate Oral Tablet 10 MG]; TAKE ONE TABLET BY MOUTH ONE TIME DAILY   Dispense: 90 tablet; Refill: 0    If protocol passes may refill for 12 months if within 3 months of last provider visit (or a total of 15 months).             Passed - Blood pressure filed in past 12 months     BP Readings from Last 1 Encounters:   10/28/19 136/74

## 2021-06-06 NOTE — TELEPHONE ENCOUNTER
Who is calling:  Patient  Reason for Call:  The patient is calling to confirm the offered pre op date and time with Dr. Mustafa on 3/25 at 1:00 pm. Writer cannot schedule, please call to confirm and schedule the patient at the number provided.    Okay to leave a detailed message: Yes

## 2021-06-06 NOTE — TELEPHONE ENCOUNTER
Left message for pt to call back. Pt had appointment this morning and needs to reschedule appointment. Please assist in rescheduling.

## 2021-06-06 NOTE — TELEPHONE ENCOUNTER
Called and lvm for patient to call back and schedule appointment. Asked patient to call and make sure surgery is still going as planned as some surgeries have been cancelled or postponed.    Ask if patient can come in on Tuesday March 25th at 1pm with Dr. Terrazas. Dr. Molina does not have appointments until 3/30

## 2021-06-06 NOTE — TELEPHONE ENCOUNTER
New Appointment Needed  What is the reason for the visit:  Pre Op - 3/30 - [facility] - Robotic Assited Right Colectomy - Dr. Hammond  Pre-Op Appt Request  When is the surgery? :  3/30/20  Where is the surgery?:   [facility]  Who is the surgeon? :  Dr. Hammond  What type of surgery is being done?: Robotic assisted right colectomy  Provider Preference: Any available  How soon do you need to be seen?: ASAP  Waitlist offered?: No  Okay to leave a detailed message:  Yes

## 2021-06-08 NOTE — PROGRESS NOTES
"Glenna Estrada is a 69 y.o. female who is being evaluated via a billable video visit.      The patient has been notified of following:     \"This video visit will be conducted via a call between you and your physician/provider. We have found that certain health care needs can be provided without the need for an in-person physical exam.  This service lets us provide the care you need with a video conversation.  If a prescription is necessary we can send it directly to your pharmacy.  If lab work is needed we can place an order for that and you can then stop by our lab to have the test done at a later time.    Video visits are billed at different rates depending on your insurance coverage. Please reach out to your insurance provider with any questions.    If during the course of the call the physician/provider feels a video visit is not appropriate, you will not be charged for this service.\"    Patient has given verbal consent to a Video visit? Yes    Patient would like to receive their AVS by AVS Preference: Mail a copy.    Patient would like the video invitation sent by: Text to cell phone: [phone]    Will anyone else be joining your video visit? No        Video Start Time: 11:17 AM    Hospital Follow-up Visit:    Assessment/Plan:     1. Cecal polyp  Removed and healing well.  Continue with home health and follow up with surgery as directed    2. Abnormal colonoscopy  S/p hemicolectomy and doing well    3. Type 2 diabetes mellitus without complication, without long-term current use of insulin (H)  Well controlled.  Reviewed recent labs and continue with diet and exercise    4. Essential hypertension  Tends to run higher but without pain patient will continue to monitor with home health and home monitor.  Will call if any issues or concerns    5. Hypothyroidism, unspecified type  Stable. Continue to take medications and plan for follow up with [facility] in the fall and repeat labs then.           Subjective:     Glenna" YAMILKA Estrada is a 69 y.o. female who presents for a hospital discharge follow up.  S/p hemicolectomy due to cecal polyp.  Home health nurse there - with PT and OT.  New walker and been outside.  No pain with surgery.  Follow up with surgery in another 2 weeks.  Urinating well, normal bowel movements.        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             a) SESSILE 2.5 X 2.1 X 0.3 CM POLYP, CECAL ASPECT OF ILEOCECAL VALVE             b) HIGH GRADE DYSPLASIA AND INVASIVE MALIGNANCY NOT IDENTIFIED             c) POLYP 4.2 CM FROM ILEAL SURGICAL MARGIN AND 15.2 CM FROM DISTAL                COLONIC MARGIN; NO EVIDENCE OF DYSPLASIA OR MALIGNANCY AT PROXIMAL                AND DISTAL SURGICAL MARGINS        2) UNREMARKABLE RADIAL SURGICAL MARGIN        3) BENIGN SUBMUCOSAL LIPOMA (1.9 CM), CECUM        4) INKED DEPOSITION PRESENT        5) MESENTERIC LYMPH NODES WITH NO EVIDENCE OF METASTATIC CARCINOMA           (O OF 28)        6) DIFFUSE FIBROUS OBLITERATION OF APPENDICEAL LUMEN; NO EVIDENCE OF APPENDICITIS        7) VIABLE APPEARING PROXIMAL AND DISTAL          Hx of DM with last A1c done 5/14/20 of 5.4.  Hx of HTN - wrist monitor at home - checking infrequently.  No HA, no CP.      Objective:     Vitals:    06/03/20 1101   BP: 157/74   Pulse: 69         Coding guidelines for this visit:  Type of Medical   Decision Making Face-to-Face Visit       within 7 Days of discharge Face-to-Face Visit        within 14 days of discharge   Moderate Complexity [id number]   High Complexity [id number]       Electronically signed by Jacqui Molina MD 06/03/20 11:14 AM     Additional provider notes:   Hospital Follow-up  Visit:    Hospital/Nursing Home/IP Rehab Facility: [facility]   Date of Admission: 05/22/20  Date of Discharge: 05/27/20  Reason(s) for Admission: colon surgery     Was your hospitalization related to COVID-19? No  Problems taking medications regularly:  None  Medication changes since discharge: None  Problems adhering to non-medication therapy:  None, just Tylenol prn for pain     Summary of hospitalization:  [facility] discharge summary reviewed  Diagnostic Tests/Treatments reviewed.  Follow up needed: none  Other Healthcare Providers Involved in Patient s Care:         Homecare  Update since discharge: improved.      Post Discharge Medication Reconciliation: discharge medications reconciled and changed, per note/orders (see AVS).  Plan of care communicated with patient                Video-Visit Details    Type of service:  Video Visit    Video End Time (time video stopped): 11:26 AM  Originating Location (pt. Location): Home    Distant Location (provider location):  [facility]     Platform used for Video Visit: Raymond Molina MD

## 2021-06-08 NOTE — ANESTHESIA POSTPROCEDURE EVALUATION
Patient: Glenna Estrada  Procedure(s):  ROBOTIC ASSISTED RIGHT COLECTOMY, INTRAOPERATIVE FLOURESNCE ANGIOGRAPHY  Anesthesia type: general    Patient location: [facility] Surgical Floor  Last vitals:   Vitals Value Taken Time   /77 5/22/2020  8:30 PM   Temp 36.5  C (97.7  F) 5/22/2020  8:30 PM   Pulse 64 5/22/2020  8:51 PM   Resp 16 5/22/2020  8:30 PM   SpO2 96 % 5/22/2020  8:30 PM   Vitals shown include unvalidated device data.  Post vital signs: stable  Level of consciousness: awake and responds to simple questions  Post-anesthesia pain: pain controlled  Post-anesthesia nausea and vomiting: no  Pulmonary: unassisted, return to baseline  Cardiovascular: stable and blood pressure at baseline  Hydration: adequate  Anesthetic events: no    QCDR Measures:  ASA# 11 - Aimee-op Cardiac Arrest: ASA11B - Patient did NOT experience unanticipated cardiac arrest  ASA# 12 - Aimee-op Mortality Rate: ASA12B - Patient did NOT die  ASA# 13 - PACU Re-Intubation Rate: ASA13B - Patient did NOT require a new airway mgmt  ASA# 10 - Composite Anes Safety: ASA10A - No serious adverse event    Additional Notes:

## 2021-06-08 NOTE — TELEPHONE ENCOUNTER
Greg verbal orders for Home care Occupational therapy as requested.    For home care, assisted living and nursing home needs for initiation of orders that pertain to nursing, physical therapy, occupational therapy and social work.  Established patient, seen by [facility] care provider within the last 2 years (5/14/20).

## 2021-06-08 NOTE — TELEPHONE ENCOUNTER
Request for Orders    Who's Requesting: Home Care Occupational Therapist    Orders being requested: OT  1 visit(s) every 1 week(s) for 1 week(s) for discharge and bathroom safety.    Where to send Orders: [facility], response through Epic preferred. Please call if you have questions.    Thank you!  Bailee Wynn, OTR/L  [phone]

## 2021-06-08 NOTE — TELEPHONE ENCOUNTER
Hi Dr. Molina,  I started home care service; requesting further order for PT and O evaluation and treatment; SN visit once a week for 4 weeks.  OK for above orders?    Phone

## 2021-06-08 NOTE — ANESTHESIA CARE TRANSFER NOTE
Last vitals:   Vitals:    05/22/20 1920   BP: (!) (P) 226/96   Pulse: (P) 84   Resp: (P) 20   Temp: (P) 37.2  C (99  F)   SpO2: (P) 98%   B/P 209/93  Patient's level of consciousness is drowsy  Spontaneous respirations: yes  Maintains airway independently: yes  Dentition unchanged: yes  Oropharynx: oropharynx clear of all foreign objects    QCDR Measures:  ASA# 20 - Surgical No data recorded  PQRS# 430 - Adult PONV Prevention: 4558F - Pt received => 2 anti-emetic agents (different classes) preop & intraop  ASA# 8 - Peds PONV Prevention: 4558F - Pt received => 2 anti-emetic agents (different classes) preop & intraop  PQRS# 424 - Aimee-op Temp Management: 4559F - At least one body temp DOCUMENTED => 35.5C or 95.9F within required timeframe  PQRS# 426 - PACU Transfer Protocol: - Transfer of care checklist used  ASA# 14 - Acute Post-op Pain: ASA14B - Patient did NOT experience pain >= 7 out of 10     MD aware of sbp >200 instructed RN to give Labetolol.

## 2021-06-08 NOTE — ANESTHESIA PREPROCEDURE EVALUATION
Anesthesia Evaluation        Airway   Mallampati: II   Pulmonary - normal exam   (+) sleep apnea (states it's improved after gastric bypass) on no CPAP, , a smoker (quit long ago)                         Cardiovascular - normal exam  Exercise tolerance: > or = 4 METS  (+) hypertension well controlled, , hypercholesterolemia,     ECG reviewed (NSR with PACS)  Rhythm: regular  Rate: normal,      ROS comment: Venous stasis     Neuro/Psych    (+) depression,   Chronic pain: neck pain.    Endo/Other    (+) hypothyroidism, obesity,   Diabetes: patient denies.     Comments: Hyperparathyroidism  metabolic syndrome  Anemia blood clots    GI/Hepatic/Renal      Comments: See HPI     Other findings: Results for SHAQ MORELAND (MRN [id number]) as of 5/22/2020 11:42    5/22/2020 11:18Results for SHAQ MORELAND (MRN [id number]) as of 5/22/2020 11:42    5/20/2020 11:34  COVID-19 VIRUS(CORONAVIRUS)PCR: Rpt  COVID-19 VIRUS PCR MRF: Rpt  2019-nCOV: Not Detected    5/22/2020 11:15    5/22/2020 11:18  ABORh: A NEG  Results for SHAQ MORELAND (MRN [id number]) as of 5/22/2020 11:42    5/14/2020 14:07  Sodium: 141  Potassium: 4.5  Chloride: 106  CO2: 28  Anion Gap, Calculation: 7  BUN: 13  Creatinine: 0.69  GFR MDRD Af Amer: >60  GFR MDRD Non Af Amer: >60  Calcium: 9.6  Glucose: 92  Hemoglobin A1c: 5.4  WBC: 11.8 (H)  RBC: 4.63  Hemoglobin: 14.0  Hematocrit: 40.9  MCV: 88  MCH: 30.2  MCHC: 34.2  RDW: 12.5  Platelets: 293    Hemoglobin: 13.8   Very poor dentition  Morbidly obese      Dental    (+) poor dentition                       Anesthesia Plan  Planned anesthetic: general endotracheal  GAETT  TAP block requested, but morbid obesity likely precludes  Ketamine after induction  Antiemetics  Soft bite block    ASA 3   Induction: intravenous   Anesthetic plan and risks discussed with: patient    Post-op plan: routine recovery

## 2021-06-08 NOTE — PROGRESS NOTES
Patient goal: Education in HEP   Reason for this episode: s/p hospitalization due to colon polyp with surgical removal  Pertinent medical history: venous stasis of LE, lymphedema, HTN, hx DVT, hx L ESTEFANÍA  Precautions/safety: (ex. contact precautions, cognition, activity restrictions, falls, surgical precautions, etc.): no lifting > 10 lbs  Prior Level of Function: completely I in All ADLs and mobility. Occasional use of cane, mostly when   leaving home. Pt states she did buy a 4WW to be able to take longer walks. Hx of getting tired/ache in back with standing or walking too long, states this has been going on for years. Used motorized scooter for longer grocery/store runs, able to walk through smaller stores (states this is mostly due to not being able to  long lines due to hx back pain). Pt I in household chores and was driving. I with sponge bathing. Wasn't usua  lly using tub due to not confident with bathing after hip surgery.   Current Level of Function: Pt getting some assistance with grocery shopping. Pt states she hasn't needed their help for anything else. Pt has been ambulating without AD in home since returning home. Has not walked longer distance since returning home.  Jarrett 21/28. Pt ambulated 400 feet x 1 with 4WW outdoors, 50 feet indoors without AD, without use of AD: decreased s  tep length, wider LORRIE, lateral deviation of body to each side, decreased gait speed, with AD: improved step length and foot clearance, unsafe use of AD. Pt demonstrates I transfers and bed mobility. Pt feels she is near baseline function and can progress fully on her own.   Persons present for visit: pt, PT  Home Environment: 1 level [facility]  Assistance Available: intermittent assist from her kids as needed.   Multidisciplinary Plan/Fol  low up Needs:   PT: 1 visit only for eval and treat.

## 2021-06-10 NOTE — TELEPHONE ENCOUNTER
RN cannot approve Refill Request    RN can NOT refill this medication historical medication requested. Last office visit: 10/28/2019 Jacqui Molina MD Last Physical: Visit date not found Last MTM visit: Visit date not found Last visit same specialty: 10/28/2019 Jacqui Molina MD.  Next visit within 3 mo: Visit date not found  Next physical within 3 mo: Visit date not found      Chanda Armas, [facility] Connection Triage/Med Refill 8/3/2020    Requested Prescriptions   Pending Prescriptions Disp Refills     metoprolol succinate (TOPROL-XL) 50 MG 24 hr tablet [Pharmacy Med Name: Metoprolol Succinate ER Oral Tablet Extended Release 24 Hour 50 MG] 135 tablet 0     Sig: TAKE ONE AND ONE-HALF TABLETS BY MOUTH DAILY       Beta-Blockers Refill Protocol Passed - 8/2/2020 11:12 PM        Passed - PCP or prescribing provider visit in past 12 months or next 3 months     Last office visit with prescriber/PCP: 10/28/2019 Jacqui Molina MD OR same dept: 10/28/2019 Jacqui Molina MD OR same specialty: 10/28/2019 Jacqui Molina MD  Last physical: Visit date not found Last MTM visit: Visit date not found   Next visit within 3 mo: Visit date not found  Next physical within 3 mo: Visit date not found  Prescriber OR PCP: Jacqui Molina MD  Last diagnosis associated with med order: 1. Essential hypertension  - lisinopriL (PRINIVIL,ZESTRIL) 20 MG tablet [Pharmacy Med Name: Lisinopril Oral Tablet 20 MG]; TAKE ONE TABLET BY MOUTH TWICE DAILY   Dispense: 180 tablet; Refill: 0    If protocol passes may refill for 12 months if within 3 months of last provider visit (or a total of 15 months).             Passed - Blood pressure filed in past 12 months     BP Readings from Last 1 Encounters:   06/05/20 128/62                lisinopriL (PRINIVIL,ZESTRIL) 20 MG tablet [Pharmacy Med Name: Lisinopril Oral Tablet 20 MG] 180 tablet 0     Sig: TAKE ONE TABLET BY MOUTH TWICE DAILY       Ace Inhibitors Refill Protocol Passed - 8/2/2020 11:12 PM         Passed - PCP or prescribing provider visit in past 12 months       Last office visit with prescriber/PCP: 10/28/2019 Jacqui Molina MD OR same dept: 10/28/2019 Jacqui Molina MD OR same specialty: 10/28/2019 Jacqui Molina MD  Last physical: Visit date not found Last MTM visit: Visit date not found   Next visit within 3 mo: Visit date not found  Next physical within 3 mo: Visit date not found  Prescriber OR PCP: Jacqui Molina MD  Last diagnosis associated with med order: 1. Essential hypertension  - lisinopriL (PRINIVIL,ZESTRIL) 20 MG tablet [Pharmacy Med Name: Lisinopril Oral Tablet 20 MG]; TAKE ONE TABLET BY MOUTH TWICE DAILY   Dispense: 180 tablet; Refill: 0    If protocol passes may refill for 12 months if within 3 months of last provider visit (or a total of 15 months).             Passed - Serum Potassium in past 12 months     Lab Results   Component Value Date    Potassium 3.8 05/25/2020             Passed - Blood pressure filed in past 12 months     BP Readings from Last 1 Encounters:   06/05/20 128/62             Passed - Serum Creatinine in past 12 months     Creatinine   Date Value Ref Range Status   05/25/2020 0.59 (L) 0.60 - 1.10 mg/dL Final

## 2021-06-10 NOTE — TELEPHONE ENCOUNTER
RN cannot approve Refill Request    RN can NOT refill this medication Protocol failed and NO refill given. Last office visit: 10/28/2019 Jacqui Molina MD Last Physical: Visit date not found Last MTM visit: Visit date not found Last visit same specialty: 10/28/2019 Jacqui Molina MD.  Next visit within 3 mo: Visit date not found  Next physical within 3 mo: Visit date not found      Maria E Hughes, [facility] Triage/Med Refill 8/9/2020    Requested Prescriptions   Pending Prescriptions Disp Refills     oxybutynin (DITROPAN) 5 MG tablet [Pharmacy Med Name: Oxybutynin Chloride Oral Tablet 5 MG] 180 tablet 0     Sig: TAKE ONE TABLET BY MOUTH TWICE DAILY       There is no refill protocol information for this order

## 2021-06-13 NOTE — TELEPHONE ENCOUNTER
RN cannot approve Refill Request    RN can NOT refill this medication historical medication requested. Last office visit: 10/28/2019 Jacqui Molina MD Last Physical: Visit date not found Last MTM visit: Visit date not found Last visit same specialty: 10/28/2019 Jacqui Molina MD.  Next visit within 3 mo: Visit date not found  Next physical within 3 mo: Visit date not found      Chanda Armas, [facility] Triage/Med Refill 12/8/2020    Requested Prescriptions   Pending Prescriptions Disp Refills     amLODIPine (NORVASC) 10 MG tablet [Pharmacy Med Name: amLODIPine Besylate Oral Tablet 10 MG] 90 tablet 0     Sig: TAKE ONE TABLET BY MOUTH ONE TIME DAILY       Calcium-Channel Blockers Protocol Passed - 12/7/2020 11:53 AM        Passed - PCP or prescribing provider visit in past 12 months or next 3 months     Last office visit with prescriber/PCP: 10/28/2019 Jacqui Molina MD OR same dept: Visit date not found OR same specialty: 10/28/2019 Jacqui Molina MD  Last physical: Visit date not found Last MTM visit: Visit date not found   Next visit within 3 mo: Visit date not found  Next physical within 3 mo: Visit date not found  Prescriber OR PCP: Jacqui Molina MD  Last diagnosis associated with med order: There are no diagnoses linked to this encounter.  If protocol passes may refill for 12 months if within 3 months of last provider visit (or a total of 15 months).             Passed - Blood pressure filed in past 12 months     BP Readings from Last 1 Encounters:   06/05/20 128/62

## 2021-06-13 NOTE — TELEPHONE ENCOUNTER
Patient Returning Call  Reason for call:  Patient returning call to the clinic.  Information relayed to patient:  Yes as instructed and patient agrees with plan.  Patient has additional questions:  yes  If YES, what are your questions/concerns:  Please enter lab for chol and thyroid.    Okay to leave a detailed message?: Yes

## 2021-06-13 NOTE — TELEPHONE ENCOUNTER
Refill Approved    Rx renewed per Medication Renewal Policy. Medication was last renewed on 10/28/19.    Chanda Armas, [facility] Triage/Med Refill 12/21/2020     Requested Prescriptions   Pending Prescriptions Disp Refills     levothyroxine (SYNTHROID, LEVOTHROID) 150 MCG tablet [Pharmacy Med Name: Levothyroxine Sodium Oral Tablet 150 MCG] 90 tablet 0     Sig: TAKE ONE TABLET BY MOUTH ONE TIME DAILY       Thyroid Hormones Protocol Passed - 12/18/2020  5:20 PM        Passed - Provider visit in past 12 months or next 3 months     Last office visit with prescriber/PCP: 10/28/2019 Jacqui Molina MD OR same dept: Visit date not found OR same specialty: 10/28/2019 Jacqui Molina MD  Last physical: Visit date not found Last MTM visit: Visit date not found   Next visit within 3 mo: Visit date not found  Next physical within 3 mo: Visit date not found  Prescriber OR PCP: Jacqui Molina MD  Last diagnosis associated with med order: 1. Hypothyroidism, unspecified type  - levothyroxine (SYNTHROID, LEVOTHROID) 150 MCG tablet [Pharmacy Med Name: Levothyroxine Sodium Oral Tablet 150 MCG]; TAKE ONE TABLET BY MOUTH ONE TIME DAILY   Dispense: 90 tablet; Refill: 0    If protocol passes may refill for 12 months if within 3 months of last provider visit (or a total of 15 months).             Passed - TSH on file in past 12 months for patient age 12 & older     TSH   Date Value Ref Range Status   12/07/2020 0.35 0.30 - 5.00 uIU/mL Final

## 2021-06-13 NOTE — TELEPHONE ENCOUNTER
Call placed to patient regarding rx request. Left message for patient to call back. When she calls back please help her set up a lab only appointment

## 2021-06-13 NOTE — TELEPHONE ENCOUNTER
Medication Request  Medication name: pneumonia shot  Requested Pharmacy: hoping to add on to her lab appt monday  Reason for request: preventive  When did you use medication last?: unknown  Patient offered appointment: pt only wants to make one trip to the office   Okay to leave a detailed message: yes

## 2021-06-13 NOTE — TELEPHONE ENCOUNTER
RN cannot approve Refill Request    RN can NOT refill this medication Protocol failed and NO refill given. Last office visit: Visit date not found Last Physical: Visit date not found Last MTM visit: Visit date not found Last visit same specialty: 10/28/2019 Jacqui Molina MD.  Next visit within 3 mo: Visit date not found  Next physical within 3 mo: Visit date not found      Maria E Hughes, [facility] Triage/Med Refill 11/15/2020    Requested Prescriptions   Pending Prescriptions Disp Refills     oxybutynin (DITROPAN) 5 MG tablet [Pharmacy Med Name: Oxybutynin Chloride Oral Tablet 5 MG] 180 tablet 0     Sig: TAKE ONE TABLET BY MOUTH TWICE DAILY       There is no refill protocol information for this order

## 2021-06-13 NOTE — TELEPHONE ENCOUNTER
RN cannot approve Refill Request    RN can NOT refill this medication PCP messaged that patient is overdue for Labs. Last office visit: 10/28/2019 Jacqui Molina MD Last Physical: Visit date not found Last MTM visit: Visit date not found Last visit same specialty: 10/28/2019 Jacqui Molina MD.  Next visit within 3 mo: Visit date not found  Next physical within 3 mo: Visit date not found      Maria E Hughes, [facility] Triage/Med Refill 11/29/2020    Requested Prescriptions   Pending Prescriptions Disp Refills     gemfibroziL (LOPID) 600 MG tablet [Pharmacy Med Name: Gemfibrozil Oral Tablet 600 MG] 180 tablet 0     Sig: TAKE ONE TABLET BY MOUTH TWICE DAILY       Gemfibrozil Refill Protocol Failed - 11/29/2020  9:23 AM        Failed - Fasting lipid cascade in last 12 months     Cholesterol   Date Value Ref Range Status   10/28/2019 166 <=199 mg/dL Final     Triglycerides   Date Value Ref Range Status   10/28/2019 124 <=149 mg/dL Final     HDL Cholesterol   Date Value Ref Range Status   10/28/2019 41 (L) >=50 mg/dL Final     LDL Calculated   Date Value Ref Range Status   10/28/2019 100 <=129 mg/dL Final     Patient Fasting > 8hrs?   Date Value Ref Range Status   10/28/2019 Unknown  Final             Failed - AST or ALT in last 12 months     AST   Date Value Ref Range Status   10/28/2019 18 0 - 40 U/L Final     ALT   Date Value Ref Range Status   10/28/2019 21 0 - 45 U/L Final               Passed - PCP or prescribing provider visit in last 12 months       Last office visit with prescriber/PCP: 10/28/2019 Jacqui Molina MD OR same dept: Visit date not found OR same specialty: 10/28/2019 Jacqui Molina MD  Last physical: Visit date not found Last MTM visit: Visit date not found   Next visit within 3 mo: Visit date not found  Next physical within 3 mo: Visit date not found  Prescriber OR PCP: Jacqui Molina MD  Last diagnosis associated with med order: 1. Dyslipidemia  - gemfibroziL (LOPID) 600 MG tablet  [Pharmacy Med Name: Gemfibrozil Oral Tablet 600 MG]; TAKE ONE TABLET BY MOUTH TWICE DAILY   Dispense: 180 tablet; Refill: 0    If protocol passes may refill for 12 months if within 3 months of last provider visit (or a total of 15 months).

## 2021-06-14 NOTE — PROGRESS NOTES
Lab orders for 3.5 yrs post op as well as those requested by pt's endocrinologist,  placed for patient in preparation for appointment with  in November.    June Henriquez RN, [facility]  P [phone]  F [phone]
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
## 2021-06-14 NOTE — PROGRESS NOTES
As of 12/14/2016  Bone Mass Measurements covered once every 2 years and  1) women determined to be estrogen deficient or at risk for osteoporosis  2) individuals with vertebral abnormalities or primary hyperparathyroidism    Cardiovascular Disease Screening  covered once every 5 years for all asymptomatic beneficiaries    Colorectal Cancer Screening Normal risk patients, aged 50+ covered for:   screening FOBT every year  screening flex sig once every 4 years (or 10 years after colonoscopy)  screening colonoscopy once every 10 years (or 4 years after flex sig)  High risk patients, aged 50+:  screening FOBT every year  screening flex sig once every 4 years  screening colonoscopy once every 2 years (or 4 years after flex sig)   Diabetes Screening covered once per year for beneficiaries with certain risk factors for diabetes & not previously diagnosed with pre-diabetes   covered twice a year if diagnosed with pre-diabetes   Pneumococcal Vaccine/Admin all beneficiaries   Prostate Cancer Screening covered annually for all male patients aged 50+    Screening Mammography covered annually for patients aged 40+   Screening PapTest/Pelvic Exam covered annually if high risk, once every 2 years if normal risk    Screening Ultrasound for   Abnormal Aortic Aneurysm (AAA) covered once a lifetime for patients with certain risk factors for AAA   Hep B Screening covered for patients at intermediate or high risk for pema Hep B     Other Preventive Services Medicare Covers    Alcohol Misuse Screening Counseling    Counseling to Prevent Tobacco Use (see Charge section)    Depression Screening    Diabetes Self-Management Training (DSMT)    Glaucoma Screening    Hepatitis C Virus (HCV) Screening    Human Immunodeficiency Virus (HIV) Screening    Influenza, Pneumococcal and Hep B vaccines    Intensive Behavioral Therapy (IBT) for Cardiovascular Disease    IBT for Obesity    Medical Nutrition Therapy (MNT)    Screening for Sexually  Transmitted Infections, Screening  Assessment and Plan:     Patient has been advised of split billing requirements and indicates understanding: Yes  1. Routine general medical examination at a health care facility  Routine labs and screening recommended    2. Encounter for screening mammogram for malignant neoplasm of breast  - Mammo Screening Bilateral; Future    3. Essential hypertension  Refill medication.  Well controlled  - amLODIPine (NORVASC) 10 MG tablet; Take 1 tablet (10 mg total) by mouth daily.  Dispense: 90 tablet; Refill: 2  - lisinopriL (PRINIVIL,ZESTRIL) 20 MG tablet; Take 1 tablet (20 mg total) by mouth 2 (two) times a day.  Dispense: 180 tablet; Refill: 3  - metoprolol succinate (TOPROL-XL) 50 MG 24 hr tablet; Take 1.5 tablets (75 mg total) by mouth daily.  Dispense: 135 tablet; Refill: 3    4. Hypothyroidism, unspecified type  Stable.  Refill medication  - levothyroxine (SYNTHROID, LEVOTHROID) 150 MCG tablet; Take 1 tablet (150 mcg total) by mouth daily.  Dispense: 90 tablet; Refill: 1    5. Dyslipidemia  Stable.  Refill medication  - gemfibroziL (LOPID) 600 MG tablet; Take 1 tablet (600 mg total) by mouth 2 (two) times a day.  Dispense: 180 tablet; Refill: 3    6. Overactive bladder  Stable.  Will refill medication  - oxybutynin (DITROPAN) 5 MG tablet; Take 1 tablet (5 mg total) by mouth 2 (two) times a day.  Dispense: 180 tablet; Refill: 3    7. Body mass index (BMI) 45.0-49.9, adult (H)  Recommend diet and exercise    8. Hyperparathyroidism (H)  Stable and will reviewed labs.  No signs of hypercalcemia.  Seen by weight management who is monitoring    9. Type 2 diabetes mellitus without complication, without long-term current use of insulin (H)  Reviewed labs and a1c 5.3    10. Vitamin D deficiency  Recommend continue with vitamin d supplementation    11. Adenomatous polyp of colon, unspecified part of colon  Next colonoscopy due this year    13. Age-related osteoporosis without current  pathological fracture  Repeat DEXA and seeing endocrine in the fall  - DXA Bone Density Scan; Future  - DXA Bone Density Scan        The patient's current medical problems were reviewed.    I have had an Advance Directives discussion with the patient.  The following health maintenance schedule was reviewed with the patient and provided in printed form in the after visit summary:   Health Maintenance   Topic Date Due     DIABETIC FOOT EXAM  1950     ZOSTER VACCINES (1 of 2) 08/20/2001     TD 18+ HE  09/29/2018     MAMMOGRAM  01/19/2020     COLORECTAL CANCER SCREENING  02/06/2021     DIABETIC EYE EXAM  03/10/2021     A1C  06/07/2021     BMP  12/07/2021     LIPID  12/07/2021     MICROALBUMIN  12/11/2021     MEDICARE ANNUAL WELLNESS VISIT  01/21/2022     FALL RISK ASSESSMENT  01/21/2022     ADVANCE CARE PLANNING  01/21/2026     DEXA SCAN  01/21/2035     Pneumococcal Vaccine: 65+ Years  Completed     INFLUENZA VACCINE RULE BASED  Completed     Pneumococcal Vaccine: Pediatrics (0 to 5 Years) and At-Risk Patients (6 to 64 Years)  Aged Out     HEPATITIS C SCREENING  Discontinued        Subjective:   Chief Complaint: Glenna Estrada is an 70 y.o. female here for an Annual Wellness visit.   HPI:  Needs info on living will.  Wanting to wait on vaccines until after getting covid vaccine.  Interested in td, shingles.    Pain in the back occasionally.  In the past fell on cement steps in 2000.  Taking quinine water and will help.  Using heat and worse at the end of the day.  Using NSAIDs for the pain at time.     DEXA done in 2019.  Hx of seeing endocrine and told didn't need to be on medication.  Currently waiting on treatment and will repeat DEXA in the fall.  No checking sugars.    Lab Results   Component Value Date    HGBA1C 5.3 12/07/2020       Review of Systems: Please see above.  The rest of the review of systems are negative for all systems.    Patient Care Team:  Jacqui Molina MD as PCP - General (Family  Medicine)  Charmaine Hinton PA-C as Assigned PCP     Patient Active Problem List   Diagnosis     Essential hypertension, benign     Hypothyroidism     Obesity     Anemia     Osteoarthritis     Nontropical (Celiac) Sprue     Venous stasis of lower extremity     S/P total hip arthroplasty     History of DVT (deep vein thrombosis)     Vitamin D deficiency     Body mass index (BMI) 45.0-49.9, adult     History of gastric bypass     Lymphedema     Scar condition and fibrosis of skin     Acquired lymphedema of leg     Type 2 diabetes mellitus without complication, without long-term current use of insulin (H)     Onychomycosis of toenail     Hyperparathyroidism (H)     Osteoporosis     H/O colonoscopy recommend follow up 2018     Overactive bladder     Thiamine deficiency     Abnormal colonoscopy     Polyp of colon     Benign neoplasm of ascending colon     Benign neoplasm of descending colon     Benign neoplasm of sigmoid colon     Benign neoplasm of cecum     Past Medical History:   Diagnosis Date     Anemia      Arthritis      Diabetes mellitus (H)      Disease of thyroid gland      Dyslipidemia      History of blood clots     DVT     History of blood transfusion reaction     Rash     History of transfusion      Hyperparathyroidism (H)      Lymphedema      Metabolic syndrome      Morbid obesity with BMI of 50.0-59.9, adult (H)      Other and unspecified postsurgical nonabsorption       Past Surgical History:   Procedure Laterality Date     ABDOMINAL SURGERY       JOINT REPLACEMENT Left     ESTEFANÍA     MS LAP GASTRIC BYPASS/CIERRA-EN-Y N/A 6/9/2014    Procedure: LAPAROSCOPIC CIERRA-EN-Y GASTRIC BYPASS WITH OMENTOPEXY, POSSIBLE OPEN (Room 4710);  Surgeon: Timbo Sweet MD;  Location: [facility];  Service: General     ROTATOR CUFF REPAIR       TONSILLECTOMY AND ADENOIDECTOMY       TOTAL HIP ARTHROPLASTY Left 12/31/2014    Procedure: LEFT TOTAL HIP ARTHROPLASTY;  Surgeon: Landon Canalse MD;  [facility];  Service:      TUBAL LIGATION Bilateral      VASCULAR SURGERY  2014    Right leg skin graft      Family History   Problem Relation Age of Onset     Breast cancer Mother      Heart disease Father      Early death Father      Pancreatic cancer Sister      Heart disease Brother      Heart disease Paternal Grandfather       Social History     Socioeconomic History     Marital status:      Spouse name: Not on file     Number of children: Not on file     Years of education: Not on file     Highest education level: Not on file   Occupational History     Not on file   Social Needs     Financial resource strain: Not on file     Food insecurity     Worry: Not on file     Inability: Not on file     Transportation needs     Medical: Not on file     Non-medical: Not on file   Tobacco Use     Smoking status: Former Smoker     Packs/day: 1.50     Years: 20.00     Pack years: 30.00     Types: Cigarettes     Quit date: 1992     Years since quittin.2     Smokeless tobacco: Never Used   Substance and Sexual Activity     Alcohol use: Yes     Comment: rarely     Drug use: No     Sexual activity: Not Currently     Birth control/protection: Post-menopausal   Lifestyle     Physical activity     Days per week: Not on file     Minutes per session: Not on file     Stress: Not on file   Relationships     Social connections     Talks on phone: Not on file     Gets together: Not on file     Attends Anabaptism service: Not on file     Active member of club or organization: Not on file     Attends meetings of clubs or organizations: Not on file     Relationship status: Not on file     Intimate partner violence     Fear of current or ex partner: Not on file     Emotionally abused: Not on file     Physically abused: Not on file     Forced sexual activity: Not on file   Other Topics Concern     Not on file   Social History Narrative     Not on file      Current Outpatient Medications   Medication Sig Dispense  Refill     acetaminophen (TYLENOL) 650 MG CR tablet Take 1,300 mg by mouth every 8 (eight) hours as needed for pain.       amLODIPine (NORVASC) 10 MG tablet Take 1 tablet (10 mg total) by mouth daily. 90 tablet 2     ascorbic acid (ASCORBIC ACID WITH FELICE HIPS) 500 MG tablet Take 500 mg by mouth daily.       aspirin 81 MG EC tablet Take 81 mg by mouth daily.       calcium citrate-vitamin D (CITRACAL+D) 315-200 mg-unit per tablet Take 1 tablet by mouth 2 (two) times a day.       calcium, as carbonate, (TUMS) 200 mg calcium (500 mg) chewable tablet Chew 1 tablet (200 mg total) 3 (three) times a day as needed.  0     cholecalciferol, vitamin D3, 5,000 unit Tab Take 1 tablet by mouth daily.       cyanocobalamin, vitamin B-12, (VITAMIN B-12) 5,000 mcg Subl Place 5,000 mcg under the tongue daily.       folic acid (FOLVITE) 800 MCG tablet Take 800 mcg by mouth daily.        gemfibroziL (LOPID) 600 MG tablet Take 1 tablet (600 mg total) by mouth 2 (two) times a day. 180 tablet 3     glucosamine-chondroitin 500-400 mg tablet Take 1 tablet by mouth daily.       Lactobacillus rhamnosus GG (CULTURELLE) 10-15 Billion cell capsule Take 1 capsule by mouth daily.       levothyroxine (SYNTHROID, LEVOTHROID) 150 MCG tablet Take 1 tablet (150 mcg total) by mouth daily. 90 tablet 1     lisinopriL (PRINIVIL,ZESTRIL) 20 MG tablet Take 1 tablet (20 mg total) by mouth 2 (two) times a day. 180 tablet 3     metoprolol succinate (TOPROL-XL) 50 MG 24 hr tablet Take 1.5 tablets (75 mg total) by mouth daily. 135 tablet 3     multivitamin with minerals tablet Take 1 tablet by mouth 2 (two) times a day.       oxybutynin (DITROPAN) 5 MG tablet Take 1 tablet (5 mg total) by mouth 2 (two) times a day. 180 tablet 3     thiamine (VITAMIN B-1) 50 MG tablet Take 100 mg by mouth daily.       vit A,C & E-lutein-minerals (OCUVITE WITH LUTEIN) 1,000 unit-200 mg-60 unit-2 mg Tab Take 1 tablet by mouth daily.       No current facility-administered medications  "for this visit.       Objective:   Vital Signs:   Visit Vitals  /80 (Patient Site: Right Arm, Patient Position: Sitting, Cuff Size: Adult Regular)   Pulse 65   Ht 5' 6\" (1.676 m)   Wt (!) 266 lb (120.7 kg)   SpO2 99%   BMI 42.93 kg/m           VisionScreening:  No exam data present     PHYSICAL EXAM    Physical Exam:  General Appearance: Alert, cooperative, no distress, appears stated age   Head: Normocephalic, without obvious abnormality, atraumatic  Eyes: PERRL, conjunctiva/corneas clear, EOM's intact   Ears: Normal TM's and external ear canals, both ears  Nose:Nares normal, septum midline,mucosa normal, no drainage    Throat:Lips, mucosa, and tongue normal; teeth and gums normal  Neck: Supple, symmetrical, trachea midline, no adenopathy;  thyroid: not enlarged, symmetric, no tenderness/mass/nodules  Back: Symmetric, no curvature, ROM normal,  Lungs: Clear to auscultation bilaterally, respirations unlabored  Heart: Regular rate and rhythm, S1 and S2 normal, no murmur, rub, or gallop  Abdomen: Soft, non-tender, bowel sounds active all four quadrants,  no masses, no organomegaly  Extremities: Extremities normal, atraumatic, no cyanosis or edema  Skin: Skin color, texture, turgor normal, no rashes or lesions  Lymph nodes: Cervical, supraclavicular, and axillary nodes normal  Neurologic: Normal            Assessment Results 1/21/2021   Activities of Daily Living No help needed   Instrumental Activities of Daily Living No help needed   Mini Cog Total Score 5   Some recent data might be hidden     A Mini-Cog score of 0-2 suggests the possibility of dementia, score of 3-5 suggests no dementia    Identified Health Risks:       She is at risk for lack of exercise and has been provided with information to increase physical activity for the benefit of her well-being.  The patient was counseled and encouraged to consider modifying their diet and eating habits. She was provided with information on recommended healthy " diet options.  The patient was provided with written information regarding signs of hearing loss.  Information on urinary incontinence and treatment options given to patient.  The patient was provided with suggestions to help her develop a healthy emotional lifestyle.   Information regarding advance directives (living [place]), including where she can download the appropriate form, was provided to the patient via the AVS.

## 2021-06-14 NOTE — PROGRESS NOTES
Bariatric Follow Up Visit with a History of Previous Bariatric Surgery     Date of visit: 11/22/2017  Physician: Cee Sanford MD  Primary Care Provider:  MD Glenna Mrecado   67 y.o.  female    Date of Surgery: 6/9/2014  Initial Weight: 306#  Initial BMI: 51  Today's Weight:   Wt Readings from Last 1 Encounters:   11/22/17 (!) 246 lb (111.6 kg)     Body mass index is 40.94 kg/(m^2).      Assessment and Plan     Assessment: Glenna is a 67 y.o. year old female who is 3 yrs 5 months s/p  Adin en Y Gastric Bypass with Dr. Micki Estrada feels as if she has achieved the goals she hoped to accomplish through bariatric surgery and weight loss.  She has maintained a 60# weight loss   Encounter Diagnoses   Name Primary?     Postoperative malabsorption      Osteoporosis Yes         Current Outpatient Prescriptions:      acetaminophen (ARTHRITIS PAIN RELIEF, ACETAM,) 650 MG CR tablet, Take 650 mg by mouth every 8 (eight) hours as needed for pain., Disp: , Rfl:      amLODIPine (NORVASC) 10 MG tablet, TAKE ONE TABLET EVERY DAY, Disp: 90 tablet, Rfl: 0     ascorbic acid (ASCORBIC ACID WITH FELICE HIPS) 500 MG tablet, Take 500 mg by mouth daily., Disp: , Rfl:      aspirin 81 MG EC tablet, Take 81 mg by mouth daily., Disp: , Rfl:      atenolol (TENORMIN) 25 MG tablet, Take 1 tablet (25 mg total) by mouth daily., Disp: 90 tablet, Rfl: prn     calcium citrate-vitamin D (CITRACAL+D) 315-200 mg-unit per tablet, Take 1 tablet by mouth 2 (two) times a day., Disp: , Rfl:      cholecalciferol, vitamin D3, 5,000 unit Tab, Take 1 tablet by mouth daily., Disp: , Rfl:      cyanocobalamin, vitamin B-12, (VITAMIN B-12) 5,000 mcg Subl, Place 5,000 mcg under the tongue daily., Disp: , Rfl:      ferrous sulfate 325 (65 FE) MG tablet, Take 650 mg by mouth daily with breakfast. , Disp: , Rfl:      folic acid (FOLVITE) 800 MCG tablet, Take 800 mcg by mouth daily. , Disp: , Rfl:      gemfibrozil (LOPID) 600 MG tablet,  "TAKE ONE TABLET TWICE DAILY, Disp: 180 tablet, Rfl: 2     glucosamine-chondroitin 500-400 mg tablet, Take 1 tablet by mouth daily., Disp: , Rfl:      Lactobacillus rhamnosus GG (CULTURELLE) 10-15 Billion cell capsule, Take 1 capsule by mouth daily., Disp: , Rfl:      levothyroxine (SYNTHROID, LEVOTHROID) 175 MCG tablet, TAKE ONE TABLET EVERY DAY, Disp: 30 tablet, Rfl: 1     lisinopril (PRINIVIL,ZESTRIL) 20 MG tablet, Take 1 tab po bid, Disp: 180 tablet, Rfl: 0     multivitamin with minerals tablet, Take 1 tablet by mouth 2 (two) times a day., Disp: , Rfl:      vit A,C & E-lutein-minerals (OCUVITE WITH LUTEIN) 1,000 unit-200 mg-60 unit-2 mg Tab, Take 1 tablet by mouth daily., Disp: , Rfl:     Current Facility-Administered Medications:      lidocaine 2 % jelly (XYLOCAINE), , Topical, PRN, Heide Jett NP    Plan:    Return in about 1 year (around 11/22/2018).    Bariatric Surgery Review     Interim History/LifeChanges: She has been lost to follow up for a while. She is due for f/u DEXA, reclast and Dr. Gibbs follow up. Due for mammogram.     Patient Concerns: weight, bone density, lab results    Medication changes: none    Vitamin Intake:   B-12   SL   MVI  2/d Generic Centrum Adult Complete   Vitamin D  2 daily (? Dose)   Calcium   liquid     Other                LABS: \"Reviewed  Elevated PTH predated her surgery. She is followed by Dr. Gibbs  Nausea when eating too fast rare  Vomiting if eats too much  Constipation OK  Diarrhea OK with calcium  Rashes no  Hair Loss no  Calf tenderness no  Breathing difficulty no  Reactive Hypoglycemia no  Light Headedness no   Moods OK    12 point ROS as above and otherwise negative      Habits:  Alcohol: none  Tobacco: no  Caffeine 3/c  NSAIDS none  Exercise Routine: hoping to get back to [place] and fit. Walks daily (tries) if sidewalk safe. Paranoid about falling  3 meals/day 2  Protein first yes  60grams/day  Water Separate from meals yes with some sips  Calorie " Containing Beverages OJ in her apartment  Restaurant eating/wk 0-1 rare 2  Sleeping 6 hours plus a nap  Stress OK typically. A little high now  CPAP: NA  Contraception: PM    Social History     Social History     Social History     Marital status:      Spouse name: N/A     Number of children: N/A     Years of education: N/A     Occupational History     Not on file.     Social History Main Topics     Smoking status: Former Smoker     Packs/day: 1.50     Years: 20.00     Types: Cigarettes     Quit date: 11/8/1992     Smokeless tobacco: Never Used     Alcohol use Yes      Comment: rarely     Drug use: No     Sexual activity: No     Other Topics Concern     Not on file     Social History Narrative       Past Medical History     Past Medical History:   Diagnosis Date     Arthritis      Diabetes mellitus      Disease of thyroid gland      Dyslipidemia      Hip pain      History of DVT (deep vein thrombosis)      History of transfusion     developed hives on 3rd unit     Hyperparathyroidism      Hypertension      Knee pain      Low back pain      Lymphedema      Metabolic syndrome      Morbid obesity with BMI of 50.0-59.9, adult      Osteoporosis 7/25/2016     Other and unspecified postsurgical nonabsorption      Ulcer of calf 6/10/2014     Venous stasis ulcer      Vitamin D deficiency      Problem List     Patient Active Problem List   Diagnosis     Essential Hypertension     Hypothyroidism     Obesity     Anemia     Osteoarthritis     Nontropical (Celiac) Sprue     Venous stasis of lower extremity     S/P total hip arthroplasty     History of DVT (deep vein thrombosis)     Vitamin D deficiency     History of gastric bypass     Scar condition and fibrosis of skin     Acquired lymphedema of leg     Onychomycosis of toenail     Osteoporosis     H/O colonoscopy recommend follow up 2018     Medications     Current Outpatient Prescriptions   Medication Sig     acetaminophen (ARTHRITIS PAIN RELIEF, ACETAM,) 650 MG CR  "tablet Take 650 mg by mouth every 8 (eight) hours as needed for pain.     amLODIPine (NORVASC) 10 MG tablet TAKE ONE TABLET EVERY DAY     ascorbic acid (ASCORBIC ACID WITH FELICE HIPS) 500 MG tablet Take 500 mg by mouth daily.     aspirin 81 MG EC tablet Take 81 mg by mouth daily.     atenolol (TENORMIN) 25 MG tablet Take 1 tablet (25 mg total) by mouth daily.     calcium citrate-vitamin D (CITRACAL+D) 315-200 mg-unit per tablet Take 1 tablet by mouth 2 (two) times a day.     cholecalciferol, vitamin D3, 5,000 unit Tab Take 1 tablet by mouth daily.     cyanocobalamin, vitamin B-12, (VITAMIN B-12) 5,000 mcg Subl Place 5,000 mcg under the tongue daily.     ferrous sulfate 325 (65 FE) MG tablet Take 650 mg by mouth daily with breakfast.      folic acid (FOLVITE) 800 MCG tablet Take 800 mcg by mouth daily.      gemfibrozil (LOPID) 600 MG tablet TAKE ONE TABLET TWICE DAILY     glucosamine-chondroitin 500-400 mg tablet Take 1 tablet by mouth daily.     Lactobacillus rhamnosus GG (CULTURELLE) 10-15 Billion cell capsule Take 1 capsule by mouth daily.     levothyroxine (SYNTHROID, LEVOTHROID) 175 MCG tablet TAKE ONE TABLET EVERY DAY     lisinopril (PRINIVIL,ZESTRIL) 20 MG tablet Take 1 tab po bid     multivitamin with minerals tablet Take 1 tablet by mouth 2 (two) times a day.     vit A,C & E-lutein-minerals (OCUVITE WITH LUTEIN) 1,000 unit-200 mg-60 unit-2 mg Tab Take 1 tablet by mouth daily.     Surgical History     Past Surgical History  She has a past surgical history that includes pr remove tonsils/adenoids,<12 y/o; pr ligate fallopian tube; Tonsillectomy; Vascular surgery (June 2014); pr lap gastric bypass/julius-en-y (N/A, 6/9/2014); Joint replacement (Left, 12/31/2014); Total hip arthroplasty (Left, 12/31/2014); and Rotator cuff repair.    Objective-Exam     Constitutional:  /76  Pulse 61  Ht 5' 5\" (1.651 m)  Wt (!) 246 lb (111.6 kg)  BMI 40.94 kg/m2  Height: 5' 5\" (1.651 m) (11/22/2017 12:31 PM)  Weight: 246 " lb (111.6 kg) (11/22/2017 12:31 PM)  BMI (Calculated): 40.9 (11/22/2017 12:31 PM)  General:  Pleasant and in no acute distress   Eyes:  EOMI  ENT:  Airway * 2+**  Moist mucous membranes  Neck:  Supple, No LAD, No thyromegaly, No carotid bruits appreciated  Respiratory: Normal respiratory effort, no cough, wheezes or crackles  CV:  Regular rate and Rhythm,16 murmurs, pulses 2+, no calf tenderness, bilateral LE edema/compression stockings on-no lesions  Gastrointestinal: Abdomen NT/ND, BS+  Musculoskeletal: muscle mass WNL  Skin: color fair hair full, incisions nicely healed  Neurological: No tremor, normal gait  Psychiatric: alert and oriented X3, mood and affect normal    Counseling     We reviewed the important post op bariatric recommendations:  -eating 3 meals daily  -eating protein first, getting >60gm protein daily  -eating slowly, chewing food well  -avoiding/limiting calorie containing beverages  -drinking water 15-30 minutes before or after meals  -choosing wheat, not white with breads, crackers, pastas, isac, bagels, tortillas, rice  -limiting restaurant or cafeteria eating to twice a week or less    We discussed the importance of restorative sleep and stress management in maintaining a healthy weight.  We discussed the National Weight Control Registry healthy weight maintenance strategies and ways to optimize metabolism.  We discussed the importance of physical activity including cardiovascular and strength training in maintaining a healthier weight.    We discussed the importance of life-long vitamin supplementation and life-long  follow-up.    Glenna was reminded that, to avoid marginal ulcers she should avoid tobacco at all, alcohol in excess, caffeine in excess, and NSAIDS (unless indicated for cardioprotection or othewise and opposed by a PPI).    Cee Sanford MD, FAAFP  [facility].  11/22/2017  12:49 PM     30 minutes spent with patient. >50% in counseling and coordination  of care.

## 2021-06-15 NOTE — TELEPHONE ENCOUNTER
She has a history of blood clots, she wants to get her covid vaccine, but she has heard it causes blood clots .  She wants to know if she should entertain the idea of getting that vaccine or go with the other two. Go with the other 2, given the number for the covid scheduling line, and the locations closest to her that could help her the most.    Jennifer Thomas RN, Nurse Advisor      Reason for Disposition    Information only question and nurse able to answer    Protocols used: INFORMATION ONLY CALL - NO TRIAGE-A-[place]

## 2021-06-16 PROBLEM — E51.9 THIAMINE DEFICIENCY: Status: ACTIVE | Noted: 2019-11-01

## 2021-06-16 PROBLEM — R93.3 ABNORMAL COLONOSCOPY: Status: ACTIVE | Noted: 2020-02-12

## 2021-06-16 PROBLEM — N32.81 OVERACTIVE BLADDER: Status: ACTIVE | Noted: 2018-02-22

## 2021-06-16 PROBLEM — D12.2 BENIGN NEOPLASM OF ASCENDING COLON: Status: ACTIVE | Noted: 2020-02-07

## 2021-06-16 PROBLEM — D12.4 BENIGN NEOPLASM OF DESCENDING COLON: Status: ACTIVE | Noted: 2020-02-07

## 2021-06-16 PROBLEM — K63.5 POLYP OF COLON: Status: ACTIVE | Noted: 2020-02-05

## 2021-06-16 PROBLEM — D12.0 BENIGN NEOPLASM OF CECUM: Status: ACTIVE | Noted: 2020-02-07

## 2021-06-16 PROBLEM — D12.5 BENIGN NEOPLASM OF SIGMOID COLON: Status: ACTIVE | Noted: 2020-02-07

## 2021-06-16 NOTE — PROGRESS NOTES
ASSESSMENT/PLAN:  1. Essential hypertension  Blood pressure is incompletely controlled.  We will return to her twice daily dosing of lisinopril.  If this does not help to return her blood pressures to normal, will increase her metoprolol.  She will follow-up in 1 month for recheck of her blood pressures and continue with home monitoring.  - lisinopril (PRINIVIL,ZESTRIL) 20 MG tablet; Take 1 tablet (20 mg total) by mouth 2 (two) times a day.  Dispense: 60 tablet; Refill: 11    2. Hypothyroidism  She does follow with endocrinology who manages her hypothyroidism, secondary hyperparathyroidism, and osteoporosis.  Her endocrinologist is Dr. Harp and she just saw her in February.  - levothyroxine (SYNTHROID, LEVOTHROID) 175 MCG tablet; Take 1 tablet (175 mcg total) by mouth daily.  Dispense: 30 tablet; Refill: 0    3. Elevated parathyroid hormone  Secondary hyperparathyroidism, followed by endocrinology    4. Overactive bladder  Much improved with the addition of oxybutynin.  Continuing current dosing      General Health Maintenance  Her most recent DXA was completed in 2018 and revealed osteoporosis and high fracture risk, with a lowest T-score of -4.8 in the left one third radius. She is to have a follow up DXA 1-2 years from the date it was completed.  Her most recent mammogram was completed in 2013 and revealed no radiographic evidence of malignancy. She is to continue routine screening mammogram as recommended. She is overdue for a mammogram.  She does not have a PAP smear on file.   Her most recent colonoscopy was completed in 2013 and revealed a sessile polyp, but was otherwise normal. She is on a 5-year screening plan. She is due for a colonoscopy this year.        There are no Patient Instructions on file for this visit.    No orders of the defined types were placed in this encounter.    Medications Discontinued During This Encounter   Medication Reason     lisinopril (PRINIVIL,ZESTRIL) 20 MG tablet Reorder      levothyroxine (SYNTHROID, LEVOTHROID) 175 MCG tablet Reorder       No Follow-up on file.    CHIEF COMPLAINT;  Chief Complaint   Patient presents with     Medication Management       HISTORY OF PRESENT ILLNESS:  Glenna is a 67 y.o. female presenting to the clinic today for medication management.    Hypertension: Her blood pressures are elevated in clinic today. She has been monitoring her blood pressures at home. She reports that her blood pressure readings have been a little higher. She is unsure if it is related to her electronic cuff, as she feels she has been having some troubles with this. She continues on lisinopril 20 mg daily, metoprolol 50 mg daily, and amlodipine 10 mg daily. She does not report adverse side effects.     Overactive Bladder: She continues on oxybutynin 5 mg daily. She does not mention having problems with the medication. She feels this is working very well for her. She does still wake up once per night, but feels this is manageable. She is now able to make it to the restroom without incontinence.     Hypothyroidism: She is followed by Dr. Roblero and saw her in January. Her levothyroxine 175 mcg daily was changed to 175 mcg 6 times weekly and 87.5 mcg once weekly. She does not report experiencing adverse side effects. Her most recent PTH was 161 on 2/6/18 and her most recent TSH was 0.04 on 11/22/17. She did have a [facility] scan done at [facility] recently.     Health Maintenance: She had a colonoscopy completed in 2013 and it revealed a sessile polyp, but was otherwise normal; she is on a 5-year screening plan. She is due for a colonoscopy this year. She had a mammogram completed in 2013 and it was normal. She does not have a PAP on file. Her most recent DXA was done 1/19/18.     REVIEW OF SYSTEMS:  She endorses occasional dry mouth that she feels is related to the weather. All other systems are negative.    PFSH:  Reviewed, as below.    TOBACCO USE:  History   Smoking Status      Former Smoker     Packs/day: 1.50     Years: 20.00     Types: Cigarettes     Quit date: 11/8/1992   Smokeless Tobacco     Never Used       VITALS:  Vitals:    02/22/18 1054 02/22/18 1105   BP: 164/70 164/68   Patient Site:  Right Arm   Patient Position:  Sitting   Pulse: 72    Resp: 18    Weight: (!) 252 lb (114.3 kg)      Wt Readings from Last 3 Encounters:   02/22/18 (!) 252 lb (114.3 kg)   12/18/17 (!) 248 lb (112.5 kg)   11/22/17 (!) 246 lb (111.6 kg)     Body mass index is 41.93 kg/(m^2).    PHYSICAL EXAM:  GENERAL APPEARANCE: Alert, cooperative, no distress, appears stated age  BP rechecked by MD: 164/68, RUE, Sitting  HEAD: Normocephalic, without obvious abnormality, atraumatic  BACK: Symmetric, no curvature, ROM normal, no CVA tenderness  LUNGS: Clear to auscultation bilaterally, respirations unlabored  CHEST WALL: No tenderness or deformity  HEART: Regular rate and rhythm, S1 and S2 normal, no murmur, rub or gallop  NEUROLOGIC: CNII-XII intact.     RECENT RESULTS  No results found for this or any previous visit (from the past 48 hour(s)).      ADDITIONAL HISTORY SUMMARIZED (2): Dr. Sanford note 11/16/16 note reviewed, secondary hypoparathyroidism, plan to recheck PTH.   DECISION TO OBTAIN EXTRA INFORMATION (1): None.  RADIOLOGY TESTS (1): None.  LABS (1): Labs 11/15/17 reviewed, .  MEDICINE TESTS (1): None.  INDEPENDENT REVIEW (2 each): None.    The visit lasted a total of 10 minutes face to face with the patient. Over 50% of the time was spent counseling and educating the patient about overactive bladder, hypertension, hypothyroidism, and general health maintenance.    IBethany, am scribing for and in the presence of, Dr. Townsend.    I, Dr. Townsend, personally performed the services described in this documentation, as scribed by Bethany Chapa in my presence, and it is both accurate and complete.    MEDICATIONS:  Current Outpatient Prescriptions   Medication Sig Dispense Refill      acetaminophen (ARTHRITIS PAIN RELIEF, ACETAM,) 650 MG CR tablet Take 650 mg by mouth every 8 (eight) hours as needed for pain.       amLODIPine (NORVASC) 10 MG tablet TAKE ONE TABLET EVERY DAY 90 tablet 4     ascorbic acid (ASCORBIC ACID WITH FELICE HIPS) 500 MG tablet Take 500 mg by mouth daily.       aspirin 81 MG EC tablet Take 81 mg by mouth daily.       calcium citrate-vitamin D (CITRACAL+D) 315-200 mg-unit per tablet Take 1 tablet by mouth 2 (two) times a day.       cholecalciferol, vitamin D3, 5,000 unit Tab Take 1 tablet by mouth daily.       cyanocobalamin, vitamin B-12, (VITAMIN B-12) 5,000 mcg Subl Place 5,000 mcg under the tongue daily.       ferrous sulfate 325 (65 FE) MG tablet Take 650 mg by mouth daily with breakfast.        folic acid (FOLVITE) 800 MCG tablet Take 800 mcg by mouth daily.        gemfibrozil (LOPID) 600 MG tablet TAKE ONE TABLET TWICE DAILY 180 tablet 2     glucosamine-chondroitin 500-400 mg tablet Take 1 tablet by mouth daily.       Lactobacillus rhamnosus GG (CULTURELLE) 10-15 Billion cell capsule Take 1 capsule by mouth daily.       levothyroxine (SYNTHROID, LEVOTHROID) 175 MCG tablet Take 1 tablet (175 mcg total) by mouth daily. 30 tablet 0     lisinopril (PRINIVIL,ZESTRIL) 20 MG tablet Take 1 tablet (20 mg total) by mouth 2 (two) times a day. 60 tablet 11     metoprolol succinate (TOPROL XL) 50 MG 24 hr tablet Take 1 tablet (50 mg total) by mouth daily. 30 tablet 11     multivitamin with minerals tablet Take 1 tablet by mouth 2 (two) times a day.       oxybutynin (DITROPAN) 5 MG tablet Take 1 tablet (5 mg total) by mouth 2 (two) times a day. 60 tablet 11     vit A,C & E-lutein-minerals (OCUVITE WITH LUTEIN) 1,000 unit-200 mg-60 unit-2 mg Tab Take 1 tablet by mouth daily.       Current Facility-Administered Medications   Medication Dose Route Frequency Provider Last Rate Last Dose     lidocaine 2 % jelly (XYLOCAINE)   Topical PRN Heide Jett NP           Total data points:  3

## 2021-06-16 NOTE — PROGRESS NOTES
ASSESSMENT/PLAN:  1. Type 2 diabetes mellitus without complication  History of some hyperglycemia but hemoglobin A1c is now 5.3 and she is not taking any medications.  She is not checking her blood sugars on any regular basis as it does not appear to be necessary at this time we will continue to watch her closely.    - Glycosylated Hemoglobin A1c  - Microalbumin, Random Urine    2. Hypothyroidism  She is not endorsing any thyroid symptoms, will check a thyroid cascade.  She does follow with endocrinology and we will send the results to Dr. Gibbs for further management.  - Thyroid Stimulating Hormone (TSH)  - T4, Free    3. Hyperparathyroidism    4. Essential hypertension  - metoprolol succinate (TOPROL XL) 50 MG 24 hr tablet; Take 1.5 tabs po daily  Dispense: 45 tablet; Refill: 11    5. Overactive bladder  Oxybutynin has been helpful for her.    6. Left wrist pain  Left wrist pain seems to be more soft tissue injury, no suggestion of fracture.  Recommend that she continue to use Tylenol or ibuprofen for any symptoms and consider a wrist brace for a week to help resolve any existing tendinitis.  Symptoms persist, consider referral to hand orthopedics.    7. Skin lesion of face  Concerning lesion of the face.  She has had it for a while but it seems to be going to bed and I would like to have dermatology take a look at it so referral is placed.  - Ambulatory referral to Dermatology    8. OA (osteoarthritis) of hip          General Health Maintenance  Her most recent DXA was completed in 2018 and revealed 2.0% decline in bone density of the spine. She is to have a follow up DXA 1-2 years from the date it was completed.  Her most recent mammogram was completed in 2013 and revealed no radiographic evidence of malignancy. She is to continue routine screening mammogram as recommended. She is overdue for a mammogram.  Her most recent colonoscopy was completed in 2013 and revealed a sessile polyp, but was otherwise normal.  She is on a 5-year screening plan. She is due for a colonoscopy this year.  She does not have a PAP smear on file.     Diabetes Quality  Lab Results   Component Value Date    HGBA1C 5.3 03/22/2018     Lab Results   Component Value Date    MICROALBUR <0.50 03/22/2018     Lab Results   Component Value Date    LDLCALC 108 11/15/2017     BP Readings from Last 1 Encounters:   03/22/18 150/68     History   Smoking Status     Former Smoker     Packs/day: 1.50     Years: 20.00     Types: Cigarettes     Quit date: 11/8/1992   Smokeless Tobacco     Never Used       Statin: no  Aspirin: yes      There are no Patient Instructions on file for this visit.    Orders Placed This Encounter   Procedures     Glycosylated Hemoglobin A1c     Thyroid Stimulating Hormone (TSH)     T4, Free     Microalbumin, Random Urine     Ambulatory referral to Dermatology     Referral Priority:   Routine     Referral Type:   Consultation     Referral Reason:   Evaluation and Treatment     Requested Specialty:   Dermatology     Number of Visits Requested:   1     Medications Discontinued During This Encounter   Medication Reason     metoprolol succinate (TOPROL XL) 50 MG 24 hr tablet Reorder       No Follow-up on file.    CHIEF COMPLAINT;  Chief Complaint   Patient presents with     Diabetes     BP, meds, check left wrist, discuss scooter       HISTORY OF PRESENT ILLNESS:  Glenna is a 67 y.o. female presenting to the clinic today for follow up regarding diabetes, hypertension, and medication management. She has been feeling well.    Osteoarthritis: She is wondering if she can get a prescription for an electric scooter. She does have a regular folding walker, but she is concerned with using this over longer distances. She is hoping to get a lightweight electric scooter to take her places. She feels that standing causes her discomfort in her hips and lower back. She does not have much trouble walking short distances, but states it is difficult if she  encounters a line where she needs to stop and stand. [email or web address], but is concerned with the long distance walking that she would need to do there. She has previously used a friend's electric scooter.     Left Wrist Pain: She was feeding her animals on Tuesday and notes that she slipped and re-gripped the cup. She is not sure if she accidentally hit her wrist on the counter or did something while slipping, but she endorses some swelling over her left wrist and hand. She did have to take off her rings because her fingers were swollen. The swelling does not typically cause discomfort, but she does endorse pain while gripping at certain times. She has been using ice packs and elevating the hand. She endorses some mild tenderness to palpation. She rates her discomfort as a 2/10.     Hypertension: Her blood pressure in clinic today is elevated. She continues on lisinopril 20 mg twice daily, metoprolol 50 mg daily, and amlodipine 10 mg daily. She does not report adverse side effects. She does endorse intermittent dizziness and lightheadedness. This does not happen continuously throughout the day.     Hypothyroidism: She is followed by Dr. Gibbs at [facility] for hypothyroidism. At her last visit 2/6/18, her levothyroxine was adjusted and recheck of TSH and free T4 in 3 months was ordered. She does continue on 175 mcg levothyroxine daily. She does not mention adverse side effects.     Overactive Bladder: She continues on oxybutynin 5 mg twice daily. She does feel that the oxybutynin did improve her overactive bladder symptoms.     Diabetes: Her A1c in clinic today is controlled at 5.3. She does not currently monitor her blood sugars at home.     REVIEW OF SYSTEMS:  She endorses a lump under the skin on the left side of her chin. [facility] over 1 [facility].  All other systems are negative.    PFSH:  Reviewed, as  below.    TOBACCO USE:  History   Smoking Status     Former Smoker     Packs/day: 1.50     Years: 20.00     Types: Cigarettes     Quit date: 11/8/1992   Smokeless Tobacco     Never Used       VITALS:  Vitals:    03/22/18 1019 03/22/18 1045   BP: 166/80 150/68   Patient Site:  Left Arm   Patient Position:  Sitting   Pulse: 72    Resp: 20    Weight: (!) 255 lb (115.7 kg)      Wt Readings from Last 3 Encounters:   03/22/18 (!) 255 lb (115.7 kg)   02/22/18 (!) 252 lb (114.3 kg)   12/18/17 (!) 248 lb (112.5 kg)     Body mass index is 42.43 kg/(m^2).    PHYSICAL EXAM:  GENERAL APPEARANCE: Alert, cooperative, no distress, appears stated age  BP rechecked by MD: 150/68, Left Arm, Sitting.  HEAD: Normocephalic, without obvious abnormality, atraumatic  LEFT WRIST: Soft tissue swelling on dorsum and lateral wrist, full range of motion, mild tenderness to palpation but not with active use  BACK: Symmetric, no curvature, ROM normal, no CVA tenderness  GAIT: Generally unsteady; slow to stand, needing support of the desk to stand  NEUROLOGIC: CNII-XII intact.     RECENT RESULTS  No results found for this or any previous visit (from the past 48 hour(s)).  ADDITIONAL HISTORY SUMMARIZED (2): Sai note 2/6/18 reviewed, adjusted dose of levothyroxine, ordered recheck of TSH and free T4.  DECISION TO OBTAIN EXTRA INFORMATION (1): None.  RADIOLOGY TESTS (1): None.  LABS (1): A1c ordered, 5.3. Microalbumin ordered.   MEDICINE TESTS (1): None.  INDEPENDENT REVIEW (2 each): None.    The visit lasted a total of 18 minutes face to face with the patient. Over 50% of the time was spent counseling and educating the patient about diabetes, hypertension, hypothyroidism, left wrist pain, overactive bladder, and immobility concerns.    IBethany, am scribing for and in the presence of, Dr. Townsend.    I, Dr. Townsend, personally performed the services described in this documentation, as scribed by Bethany Chapa in my presence, and it is  both accurate and complete.    MEDICATIONS:  Current Outpatient Prescriptions   Medication Sig Dispense Refill     acetaminophen (ARTHRITIS PAIN RELIEF, ACETAM,) 650 MG CR tablet Take 650 mg by mouth every 8 (eight) hours as needed for pain.       amLODIPine (NORVASC) 10 MG tablet TAKE ONE TABLET EVERY DAY 90 tablet 4     ascorbic acid (ASCORBIC ACID WITH FELICE HIPS) 500 MG tablet Take 500 mg by mouth daily.       aspirin 81 MG EC tablet Take 81 mg by mouth daily.       calcium citrate-vitamin D (CITRACAL+D) 315-200 mg-unit per tablet Take 1 tablet by mouth 2 (two) times a day.       cholecalciferol, vitamin D3, 5,000 unit Tab Take 1 tablet by mouth daily.       cyanocobalamin, vitamin B-12, (VITAMIN B-12) 5,000 mcg Subl Place 5,000 mcg under the tongue daily.       ferrous sulfate 325 (65 FE) MG tablet Take 650 mg by mouth daily with breakfast.        folic acid (FOLVITE) 800 MCG tablet Take 800 mcg by mouth daily.        gemfibrozil (LOPID) 600 MG tablet TAKE ONE TABLET TWICE DAILY 180 tablet 2     glucosamine-chondroitin 500-400 mg tablet Take 1 tablet by mouth daily.       Lactobacillus rhamnosus GG (CULTURELLE) 10-15 Billion cell capsule Take 1 capsule by mouth daily.       lisinopril (PRINIVIL,ZESTRIL) 20 MG tablet Take 1 tablet (20 mg total) by mouth 2 (two) times a day. 60 tablet 11     metoprolol succinate (TOPROL XL) 50 MG 24 hr tablet Take 1.5 tabs po daily 45 tablet 11     multivitamin with minerals tablet Take 1 tablet by mouth 2 (two) times a day.       oxybutynin (DITROPAN) 5 MG tablet Take 1 tablet (5 mg total) by mouth 2 (two) times a day. 60 tablet 11     vit A,C & E-lutein-minerals (OCUVITE WITH LUTEIN) 1,000 unit-200 mg-60 unit-2 mg Tab Take 1 tablet by mouth daily.       levothyroxine (SYNTHROID) 150 MCG tablet Take 1 tablet (150 mcg total) by mouth daily. 30 tablet 0     Current Facility-Administered Medications   Medication Dose Route Frequency Provider Last Rate Last Dose     lidocaine 2 %  jelly (XYLOCAINE)   Topical PRN Heide Jett, NP           Total data points: 3

## 2021-06-18 NOTE — PROGRESS NOTES
Assessment/Plan:   UTI symptoms  Acute cystitis without hematuria  Upper respiratory infection  3-4 weeks of increased urinary urgency, frequency and burning - UA suspicious for infection. No systemic symptoms to suggest pyelonephritis or sepsis. Viral URI x 8 days without evidence of secondary bacterial sinusitis, pneumonia.   - Urinalysis-UC if Indicated  - Culture, Urine  - cephalexin (KEFLEX) 500 MG capsule; Take 1 capsule (500 mg total) by mouth 2 (two) times a day for 7 days.  Dispense: 14 capsule; Refill: 0    Fluids - plenty of water  Cephalexin twice a day for 7 days  Urine culture pending - we will adjust if needed  Steam, nasal saline, lozenges as needed  Follow up as needed    Subjective:      Glenna Estrada is a 67 y.o. female who presents with urinary and respiratory symptoms.  Over Memorial Day weekend she developed increased urinary frequency, urgency and burning but since she was out of town she just bought some Monistat over the counter and used it externally for 8 days and seemed to get better with the burning.  She continued to have increased frequency of urination and urgency, especially at night (nocturia x 3, usual is x 1). She does have peripheral edema and that has been unchanged - this is why she thinks she has to get up at night the one time.  She was prescribed a bladder medication for urinary frequency (ditropan) and that has worked well to control these symptoms until now.  This week the burning has returned.  No fever, no flank or abdominal pain, no N/V/D or constipation.  For the last 8 days she has also had URI with cough and hoarseness.  She has been taking Coricidin Hbp for the symptoms.  She is a bit better but still hoarse. No wheeze, no shortness of breath, no CP or increased leg edema. No sinus pain or pressure, no ST or ear pain.  No headache or vision changes. No rash.   Allergies   Allergen Reactions     Penicillins Hives     Latex Rash     Pt states she can tolerate  short-term     Silver Rash     Current Outpatient Prescriptions on File Prior to Visit   Medication Sig Dispense Refill     acetaminophen (ARTHRITIS PAIN RELIEF, ACETAM,) 650 MG CR tablet Take 650 mg by mouth every 8 (eight) hours as needed for pain.       amLODIPine (NORVASC) 10 MG tablet TAKE ONE TABLET EVERY DAY 90 tablet 4     ascorbic acid (ASCORBIC ACID WITH FELICE HIPS) 500 MG tablet Take 500 mg by mouth daily.       aspirin 81 MG EC tablet Take 81 mg by mouth daily.       calcium citrate-vitamin D (CITRACAL+D) 315-200 mg-unit per tablet Take 1 tablet by mouth 2 (two) times a day.       cholecalciferol, vitamin D3, 5,000 unit Tab Take 1 tablet by mouth daily.       cyanocobalamin, vitamin B-12, (VITAMIN B-12) 5,000 mcg Subl Place 5,000 mcg under the tongue daily.       ferrous sulfate 325 (65 FE) MG tablet Take 650 mg by mouth daily with breakfast.        folic acid (FOLVITE) 800 MCG tablet Take 800 mcg by mouth daily.        gemfibrozil (LOPID) 600 MG tablet TAKE ONE TABLET TWICE DAILY 180 tablet 1     glucosamine-chondroitin 500-400 mg tablet Take 1 tablet by mouth daily.       Lactobacillus rhamnosus GG (CULTURELLE) 10-15 Billion cell capsule Take 1 capsule by mouth daily.       levothyroxine (SYNTHROID) 150 MCG tablet Take 1 tablet (150 mcg total) by mouth daily. 30 tablet 0     lisinopril (PRINIVIL,ZESTRIL) 20 MG tablet Take 1 tablet (20 mg total) by mouth 2 (two) times a day. 60 tablet 11     metoprolol succinate (TOPROL XL) 50 MG 24 hr tablet Take 1.5 tabs po daily 45 tablet 11     multivitamin with minerals tablet Take 1 tablet by mouth 2 (two) times a day.       oxybutynin (DITROPAN) 5 MG tablet Take 1 tablet (5 mg total) by mouth 2 (two) times a day. 60 tablet 11     vit A,C & E-lutein-minerals (OCUVITE WITH LUTEIN) 1,000 unit-200 mg-60 unit-2 mg Tab Take 1 tablet by mouth daily.       Current Facility-Administered Medications on File Prior to Visit   Medication Dose Route Frequency Provider Last Rate  Last Dose     lidocaine 2 % jelly (XYLOCAINE)   Topical PRN Heide Jett NP         Patient Active Problem List   Diagnosis     Essential Hypertension     Hypothyroidism     Obesity     Anemia     Osteoarthritis     Nontropical (Celiac) Sprue     Venous stasis of lower extremity     S/P total hip arthroplasty     History of DVT (deep vein thrombosis)     Vitamin D deficiency     History of gastric bypass     Scar condition and fibrosis of skin     Acquired lymphedema of leg     Onychomycosis of toenail     Osteoporosis     H/O colonoscopy recommend follow up 2018     Overactive bladder       Objective:     /64  Pulse 65  Temp 98.1  F (36.7  C) (Oral)   Resp 16  Wt (!) 258 lb (117 kg)  SpO2 98%  BMI 42.93 kg/m2    Physical  General Appearance: Alert, cooperative, no distress  Head: Normocephalic, without obvious abnormality, atraumatic  Eyes: Conjunctivae are normal.   Ears: Normal TMs and external ear canals, both ears  Nose: mild congestion. Clear drainage, sinuses nontender  Throat: hoarse voice. Throat is normal.  No exudate.  No significant lesions  Neck: No adenopathy; no thyroid tenderness or nodules or enlargement  Lungs: Clear to auscultation bilaterally, respirations unlabored  Heart: Regular rate and rhythm  Abdomen: Soft, non-tender, no masses, no organomegaly, no CVA tenderness with percussion.   Extremities: trace bilateral lower extremity edema  Skin: no rashes or lesions  Psychiatric: Patient has a normal mood and affect.        Recent Results (from the past 24 hour(s))   Urinalysis-UC if Indicated   Result Value Ref Range    Color, UA Yellow Colorless, Yellow, Straw, Light Yellow    Clarity, UA Clear Clear    Glucose, UA Negative Negative    Bilirubin, UA Negative Negative    Ketones, UA Negative Negative    Specific Gravity, UA <=1.005 1.005 - 1.030    Blood, UA Trace (!) Negative    pH, UA 6.0 5.0 - 8.0    Protein, UA Negative Negative mg/dL    Urobilinogen, UA 0.2 E.U./dL 0.2  E.U./dL, 1.0 E.U./dL    Nitrite, UA Negative Negative    Leukocytes, UA Moderate (!) Negative    Bacteria, UA Few (!) None Seen hpf    RBC, UA 0-2 None Seen, 0-2 hpf    WBC, UA 5-10 (!) None Seen, 0-5 hpf    Squam Epithel, UA 0-5 None Seen, 0-5 lpf
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
## 2021-06-19 NOTE — PROGRESS NOTES
ASSESSMENT/PLAN:  1. Type 2 diabetes mellitus without complication (H)  Currently her blood sugars are well controlled.  She is diet controlled.  Recommended continued diabetic diet and exercise.  Daily aspirin at 81 mg.  She is not a smoker.  Her blood pressure is not adequately controlled at this time.  We encouraged her to decrease salt intake, drink plenty of water and monitor her blood pressure at home.  If he continues to be elevated, will need to make adjustments in her blood pressure regimen, likely increasing her metoprolol to 100 mg daily.  - Glycosylated Hemoglobin A1c    2. Screen for colon cancer  - Ambulatory referral for Colonoscopy    3. Hypothyroidism  She is not endorsing any thyroid symptoms and will recheck levels today.  - Thyroid [place]  - T4, Free    Diabetes Quality  Lab Results   Component Value Date    HGBA1C 5.6 07/16/2018     Lab Results   Component Value Date    MICROALBUR <0.50 03/22/2018     Lab Results   Component Value Date    LDLCALC 108 11/15/2017     BP Readings from Last 1 Encounters:   07/16/18 154/68     History   Smoking Status     Former Smoker     Packs/day: 1.50     Years: 20.00     Types: Cigarettes     Quit date: 11/8/1992   Smokeless Tobacco     Never Used       Statin: no  Aspirin: yes    Patient Instructions   For your hypertension:   - Drink plenty of water  - Watch your salt intake  - Incorporate exercise into your daily routine  - Limit processed/take out foods    Check your blood pressures at home. Give me a call and let me know how these readings are; we may need to increase your metoprolol if the readings continue to be elevated.     Make a Nurse Only appointment for help with your blood pressure cuff.     Labs done today.        Orders Placed This Encounter   Procedures     Glycosylated Hemoglobin A1c     Thyroid [place]     T4, Free     Ambulatory referral for Colonoscopy     Referral Priority:   Routine     Referral Type:   Colonoscopy     Referral Reason:    Evaluation and Treatment     Requested Specialty:   Gastroenterology     Number of Visits Requested:   1     There are no discontinued medications.    Return in about 6 months (around 1/16/2019).    CHIEF COMPLAINT;  Chief Complaint   Patient presents with     Medication Management       HISTORY OF PRESENT ILLNESS:  Glenna is a 67 y.o. female presenting to the clinic today for medication management.    Hypertension: Her blood pressure is elevated in clinic today. She continues on metoprolol succinate 75 mg daily, lisinopril 20 mg twice daily, and amlodipine 10 mg daily. She does not report adverse side effects. She denies headaches, chest pain, or difficulty breathing. She endorses some increased stress related to her daughters moving. She does have a blood pressure cuff at home, but feels she may be having some difficulty using it.     Hypothyroidism: Her most recent TSH was 0.11 on 3/22/18. She denies thyroid symptoms. She follows Dr. Gibbs with [facility]. She is due to see Dr. Gibbs in November. She continues on levothyroxine 175 mcg daily. She does not mention having problems with the medication.     Diabetes: Her hemoglobin A1c is controlled today at 5.6%.     Overactive Bladder: She continues on oxybutynin 5 mg daily and feels this works well for her.     Health Maintenance: She is due for a colonoscopy in the fall. She is agreeable to having this done. She will look into the pneumonia vaccine; she believes this will be more cost-effective for her if it is done at a pharmacy.     REVIEW OF SYSTEMS:  She denies urinary symptoms. She ambulates with a walker. She recalls having an eye appointment last fall. All other systems are negative.    PFSH:  She had a recent UTI. Reviewed, as below.    TOBACCO USE:  History   Smoking Status     Former Smoker     Packs/day: 1.50     Years: 20.00     Types: Cigarettes     Quit date: 11/8/1992   Smokeless Tobacco     Never Used        VITALS:  Vitals:    07/16/18 1423 07/16/18 1502   BP: 144/70 154/68   Patient Site:  Left Arm   Patient Position:  Sitting   Pulse: 74    Resp: 18    Weight: (!) 260 lb (117.9 kg)      Wt Readings from Last 3 Encounters:   07/16/18 (!) 260 lb (117.9 kg)   06/23/18 (!) 258 lb (117 kg)   03/22/18 (!) 255 lb (115.7 kg)     Body mass index is 43.27 kg/(m^2).    PHYSICAL EXAM:  GENERAL APPEARANCE: Alert, cooperative, no distress, appears stated age  BP rechecked by MD: 154/68, LUE, Sitting  HEAD: Normocephalic, without obvious abnormality, atraumatic  NECK: Thyroid:  No enlargement/tenderness/nodules; no carotid    bruit or JVD  LUNGS: Clear to auscultation bilaterally, respirations unlabored  HEART: Regular rate and rhythm, S1 and S2 normal, no murmur, rub or gallop  PULSES: 2+ and symmetric all extremities  NEUROLOGIC: CNII-XII intact.     RECENT RESULTS  No results found for this or any previous visit (from the past 48 hour(s)).    ADDITIONAL HISTORY SUMMARIZED (2): None.  DECISION TO OBTAIN EXTRA INFORMATION (1): None.  RADIOLOGY TESTS (1): None.  LABS (1): Labs ordered today.  MEDICINE TESTS (1): Colonoscopy ordered today.  INDEPENDENT REVIEW (2 each): None.    The visit lasted a total of 11 minutes face to face with the patient. Over 50% of the time was spent counseling and educating the patient about diabetes, hypertension, hypothyroidism, overactive bladder, and health maintenance.    IBethany, am scribing for and in the presence of, Dr. Townsend.    I, Dr. Townsend, personally performed the services described in this documentation, as scribed by Bethany Chapa in my presence, and it is both accurate and complete.    Dragon dictation was used for this note.  Speech recognition errors are a possibility.    MEDICATIONS:  Current Outpatient Prescriptions   Medication Sig Dispense Refill     acetaminophen (ARTHRITIS PAIN RELIEF, ACETAM,) 650 MG CR tablet Take 650 mg by mouth every 8 (eight) hours as  needed for pain.       amLODIPine (NORVASC) 10 MG tablet TAKE ONE TABLET EVERY DAY 90 tablet 4     ascorbic acid (ASCORBIC ACID WITH FELICE HIPS) 500 MG tablet Take 500 mg by mouth daily.       aspirin 81 MG EC tablet Take 81 mg by mouth daily.       calcium citrate-vitamin D (CITRACAL+D) 315-200 mg-unit per tablet Take 1 tablet by mouth 2 (two) times a day.       cholecalciferol, vitamin D3, 5,000 unit Tab Take 1 tablet by mouth daily.       cyanocobalamin, vitamin B-12, (VITAMIN B-12) 5,000 mcg Subl Place 5,000 mcg under the tongue daily.       ferrous sulfate 325 (65 FE) MG tablet Take 650 mg by mouth daily with breakfast.        folic acid (FOLVITE) 800 MCG tablet Take 800 mcg by mouth daily.        gemfibrozil (LOPID) 600 MG tablet TAKE ONE TABLET TWICE DAILY 180 tablet 1     glucosamine-chondroitin 500-400 mg tablet Take 1 tablet by mouth daily.       Lactobacillus rhamnosus GG (CULTURELLE) 10-15 Billion cell capsule Take 1 capsule by mouth daily.       levothyroxine (SYNTHROID) 150 MCG tablet Take 1 tablet (150 mcg total) by mouth daily. 30 tablet 0     lisinopril (PRINIVIL,ZESTRIL) 20 MG tablet Take 1 tablet (20 mg total) by mouth 2 (two) times a day. 60 tablet 11     metoprolol succinate (TOPROL XL) 50 MG 24 hr tablet Take 1.5 tabs po daily 45 tablet 11     multivitamin with minerals tablet Take 1 tablet by mouth 2 (two) times a day.       oxybutynin (DITROPAN) 5 MG tablet Take 1 tablet (5 mg total) by mouth 2 (two) times a day. 60 tablet 11     vit A,C & E-lutein-minerals (OCUVITE WITH LUTEIN) 1,000 unit-200 mg-60 unit-2 mg Tab Take 1 tablet by mouth daily.       Current Facility-Administered Medications   Medication Dose Route Frequency Provider Last Rate Last Dose     lidocaine 2 % jelly (XYLOCAINE)   Topical PRN Heide Jett NP           Total data points: 2

## 2021-06-19 NOTE — LETTER
Letter by Carey Townsend MD at      Author: Carey Townsend MD Service: -- Author Type: --    Filed:  Encounter Date: 8/26/2019 Status: (Other)         Glenna Estrada  [address] [address]      August 26, 2019      Dear Glenna    In reviewing your records, we have determined a gap in your preventive services. Based on your age and health history, we recommend the follow service.     ? General Physical  ? Physical with a Pap Smear  ? Colon cancer screening  ? Mammogram  ? Immunization  ? Diabetic check  ? Blood pressure/cardiovascular check  ? Asthma check  ? Cholesterol test  ? Lab work  ? Med check      If you have had the service elsewhere, please contact us so we can update our records. Please let us know if you have transferred your care to another clinic.    Please call [phone] to schedule this appointment.    We believe that a strong preventive care program, including regular physicals and follow-up care is an important part of a healthy lifestyle and we are committed to helping you maintain your health.    Thank you for choosing us as your health care provider.    Sincerely,     [facility]

## 2021-06-20 ENCOUNTER — HEALTH MAINTENANCE LETTER (OUTPATIENT)
Age: 71
End: 2021-06-20

## 2021-06-20 NOTE — LETTER
Letter by Jacqui Molina MD at      Author: Jacqui Molina MD Service: -- Author Type: --    Filed:  Encounter Date: 1/2/2020 Status: Signed         Glenna Estrada  [address]  [address]  [place] [address]       January 6, 2020       Dear Ms. Estrada,       We are attempting to contact you to notify you of the positive cologuard.     Positive cologuard indicates further testing is needed but not specifically cancer or anything else.     You will need a colonoscopy now to investigate further.       Please let us know where you would like to go for the follow-up evaluation - [facility] or general surgery  for the colonoscopy.    Please call the clinic at [phone].    Sincerely,        Electronically signed by Jacqui Molina MD

## 2021-06-20 NOTE — LETTER
Letter by Charmaine Hinton PA-C at      Author: Charmaine Hinton PA-C Service: -- Author Type: --    Filed:  Encounter Date: 5/15/2020 Status: (Other)         Glenna Estrada  [address]  [address]  [place] [address]             May 15, 2020         Dear Ms. Estrada,    Below are the results from your recent visit:    Resulted Orders   Glycosylated Hemoglobin A1c   Result Value Ref Range    Hemoglobin A1c 5.4 3.5 - 6.0 %   HM2(CBC w/o Differential)   Result Value Ref Range    WBC 11.8 (H) 4.0 - 11.0 thou/uL    RBC 4.63 3.80 - 5.40 mill/uL    Hemoglobin 14.0 12.0 - 16.0 g/dL    Hematocrit 40.9 35.0 - 47.0 %    MCV 88 80 - 100 fL    MCH 30.2 27.0 - 34.0 pg    MCHC 34.2 32.0 - 36.0 g/dL    RDW 12.5 11.0 - 14.5 %    Platelets 293 140 - 440 thou/uL    MPV 7.2 7.0 - 10.0 fL        Your A1C is very good, now 5.4, good job on your diabetes control!     Please call with questions or contact us using [employer].    Sincerely,        Electronically signed by Charmaine Hinton PA-C

## 2021-06-20 NOTE — PROGRESS NOTES
Date of Service: 2018     Date last seen by Dr. Jenkins:  2016    PCP: Carey Townsend MD    Impression:   1. Long standing leg swelling bilaterally-under excellent control  2. Secondary lymphedema    3. Fibrosis and scarring  4. Morbid obesity     Plan:   1.  Questions were answered.   2.  She will continue with her compression.  New compression was written for.  3.  Discussed importance of exercise and getting back on a regular program. Recommendations given with modifications.    4.  Patient will follow up in 1 year or when needed.     Time spent with patient 15 minutes with greater than 50% time in consultation, education and coordination of care, excluding procedures.     ---------------------------------------------------------------------------------------------------------------------       Chief Complaint: bilateral leg swelling with scarring     History of Present Illness: Glenna Estrada returns to the [facility] for follow up of leg ulcer on the right lower extremity . She continues to be healed.  Her swelling has been under excellent control. She wears her compression regularly.  She updates them and gets 6 new pairs a year. She admits she has not been as good about exercising.  She has gained some weight.  She follows regularly with bariatrics as she is post gastric bypass.  She has been healthy without any significant medical problems.        Past Medical History:   Diagnosis Date     Arthritis      Diabetes mellitus (H)      Disease of thyroid gland      Dyslipidemia      Hip pain      History of DVT (deep vein thrombosis)      Hyperparathyroidism (H)      Knee pain      Low back pain      Lymphedema      Metabolic syndrome      Morbid obesity with BMI of 50.0-59.9, adult (H)      Other and unspecified postsurgical nonabsorption      Ulcer of calf (H) 6/10/2014     Venous stasis ulcer (H)        Past Surgical History:   Procedure Laterality Date     MS LAP GASTRIC  BYPASS/CIERRA-EN-Y N/A 6/9/2014    Procedure: LAPAROSCOPIC CIERRA-EN-Y GASTRIC BYPASS WITH OMENTOPEXY, POSSIBLE OPEN (Room 4710);  Surgeon: Timbo Sweet MD;  Location: [facility];  Service: General     ROTATOR CUFF REPAIR       TONSILLECTOMY AND ADENOIDECTOMY       TOTAL HIP ARTHROPLASTY Left 12/31/2014    Procedure: LEFT TOTAL HIP ARTHROPLASTY;  Surgeon: Landon Canales MD;  Location: [facility];  Service:      TUBAL LIGATION Bilateral      VASCULAR SURGERY  June 2014    Right leg skin graft         Current Outpatient Prescriptions:      acetaminophen (ARTHRITIS PAIN RELIEF, ACETAM,) 650 MG CR tablet, Take 650 mg by mouth every 8 (eight) hours as needed for pain., Disp: , Rfl:      amLODIPine (NORVASC) 10 MG tablet, TAKE ONE TABLET EVERY DAY, Disp: 90 tablet, Rfl: 4     ascorbic acid (ASCORBIC ACID WITH FELICE HIPS) 500 MG tablet, Take 500 mg by mouth daily., Disp: , Rfl:      aspirin 81 MG EC tablet, Take 81 mg by mouth daily., Disp: , Rfl:      calcium citrate-vitamin D (CITRACAL+D) 315-200 mg-unit per tablet, Take 1 tablet by mouth 2 (two) times a day., Disp: , Rfl:      cholecalciferol, vitamin D3, 5,000 unit Tab, Take 1 tablet by mouth daily., Disp: , Rfl:      cyanocobalamin, vitamin B-12, (VITAMIN B-12) 5,000 mcg Subl, Place 5,000 mcg under the tongue daily., Disp: , Rfl:      ferrous sulfate 325 (65 FE) MG tablet, Take 650 mg by mouth daily with breakfast. , Disp: , Rfl:      folic acid (FOLVITE) 800 MCG tablet, Take 800 mcg by mouth daily. , Disp: , Rfl:      gemfibrozil (LOPID) 600 MG tablet, TAKE ONE TABLET TWICE DAILY, Disp: 180 tablet, Rfl: 1     glucosamine-chondroitin 500-400 mg tablet, Take 1 tablet by mouth daily., Disp: , Rfl:      Lactobacillus rhamnosus GG (CULTURELLE) 10-15 Billion cell capsule, Take 1 capsule by mouth daily., Disp: , Rfl:      levothyroxine (SYNTHROID) 150 MCG tablet, Take 1 tablet (150 mcg total) by mouth daily., Disp: 30 tablet, Rfl: 0     lisinopril  (PRINIVIL,ZESTRIL) 20 MG tablet, Take 1 tablet (20 mg total) by mouth 2 (two) times a day., Disp: 60 tablet, Rfl: 11     metoprolol succinate (TOPROL XL) 50 MG 24 hr tablet, Take 1.5 tabs po daily, Disp: 45 tablet, Rfl: 11     multivitamin with minerals tablet, Take 1 tablet by mouth 2 (two) times a day., Disp: , Rfl:      oxybutynin (DITROPAN) 5 MG tablet, Take 1 tablet (5 mg total) by mouth 2 (two) times a day., Disp: 60 tablet, Rfl: 11     vit A,C & E-lutein-minerals (OCUVITE WITH LUTEIN) 1,000 unit-200 mg-60 unit-2 mg Tab, Take 1 tablet by mouth daily., Disp: , Rfl:   No current facility-administered medications for this visit.     Allergies   Allergen Reactions     Penicillins Hives     Latex Rash     Pt states she can tolerate short-term     Silver Rash       Social History     Social History     Marital status:      Spouse name: N/A     Number of children: N/A     Years of education: N/A     Occupational History     Not on file.     Social History Main Topics     Smoking status: Former Smoker     Packs/day: 1.50     Years: 20.00     Types: Cigarettes     Quit date: 11/8/1992     Smokeless tobacco: Never Used     Alcohol use Yes      Comment: rarely     Drug use: No     Sexual activity: No     Other Topics Concern     Not on file     Social History Narrative       Family History   Problem Relation Age of Onset     Breast cancer Mother      Heart disease Father      Early death Father      Pancreatic cancer Sister      Heart disease Brother      Heart disease Paternal Grandfather        Review of Systems:  Glenna Estrada no new numbess, tingling or weakness, redness or rashes, fevers, new masses, abdominal bloating or discomfort, unexplained weight loss, increased pain, new ulcers, shortness of breath and chest pain  Full 12 point review of systems was completed.    Imaging:    I personally reviewed the following imaging today and those on care everywhere, if indicated    No results found.    Labs:    I  personally reviewed the following labs today and those on care everywhere, if indicated    No results found for: SEDRATE      No results found for: CRP        Lab Results   Component Value Date    CREATININE 0.62 11/22/2017      Lab Results   Component Value Date    HGBA1C 5.6 07/16/2018           Lab Results   Component Value Date    BUN 11 11/15/2017              Lab Results   Component Value Date    ALBUMIN 3.4 (L) 11/15/2017       Vitamin D, Total (25-Hydroxy)   Date Value Ref Range Status   11/15/2017 33.3 30.0 - 80.0 ng/mL Final       Lab Results   Component Value Date    TSH 0.23 (L) 07/16/2018     Lab Results   Component Value Date    WBC 6.9 11/15/2017    HGB 12.7 11/15/2017    HCT 38.5 11/15/2017    MCV 89 11/15/2017     11/15/2017         Physical Exam:  Vitals:    09/21/18 1502   BP: 160/82   Pulse: 72   Resp: 14   Temp: 97.9  F (36.6  C)      BMI 46.39    Circumferential measures:    Vasc Edema 10/29/2015 11/23/2015 1/27/2016 7/13/2016 9/21/2018   Right just above MTP 24 24.0 24.1 23 24.5   Right Ankle 28 27.0 26.9 27 29.2   Right Widest Calf 44 45.0 43 47.2 54.7   Right Thigh Up 10cm 62.5 64.5 64 63 76.7   Left - just above MTP 23 23.0 22.7 22.8 24.2   Left Ankle 29 29.0 28.7 29 30.7   Left Widest Calf 43.5 45.5 46 46.5 53.6   Left Thigh Up 10cm 57.5 61.0 57.3 59.4 70.5       General:  68 y.o. female in no apparent distress.      Psych: Alert and oriented x 3.  Cooperative. Affect normal.     Neurological:  Sensation is intact to pin prick and light touch in both legs.  Strength testing is normal in  knee extension, ankle dorsiflexion and great toe extension bilaterally. Normal range of motion of knees and ankles bilaterally without any joint swelling, erythema, crepitus or laxity noted.       Vascular: Dorsalis pedis and posterior tibialis pulses are strong and equal bilaterally and biphasic with handheld doppler.      Integumentary: Skin of the legs is hyperpigmented on the right leg  especially.  There continues to be fibrosis and scarring mainly in the right leg.    This is stable.  No ulcerations are seen.  There is no calor or pain to palpation.  Nails are thickened, thin surrounding skin, discolored and ingrown.     Rebecca Jenkins MD, ABWMS, FACCWS, [facility]  Medical Director Wound Care and Lymphedema  [facility]  [phone]

## 2021-06-21 NOTE — PROGRESS NOTES
ASSESSMENT/PLAN:   1. Acute cystitis with hematuria     2. Burning with urination  Urinalysis-UC if Indicated    Culture, Urine     The patient's symptoms and laboratory studies suggest uncomplicated UTI.   Clinically, this is not pyelonephritis, urosepsis given no fevers. Nothing on history to suggest kidney stone, ovarian/testicular torsion, PID, appendicitis, diverticulitis, kidney injury, glomerular bleeding, or any other urgent or emergent condition.   Last urine culture on 6/23/18 showed pansensitive Ecoli.    The patient is vitally stable and appropriate for outpatient antibiotic therapy. I have prescribed Bactrim for the patient. I educated the patient to drink plenty of fluids and to take a probiotic while taking antibiotics.    I educated the patient on warning signs for immediate follow up including fevers/chills, nausea, vomiting, hematuria, abdominal pain, altered mental status. I educated the patient to otherwise follow up with primary care doctor as needed or if symptoms do not improve. Please view below patient instructions for patient education and return precautions as were discussed during visit.  Patient understood and agreed. Patient was stable for discharge.      Patient Instructions:  Patient Instructions   Your urine test shows evidence of a urinary tract infection.    We will treat you with an antibiotic, Bactrim.  I sent this to your pharmacy. Please take as directed twice daily x 7 days. Please take a probiotic while you are on the antibiotic.    If developing high fevers, vomiting, abdominal pain, or any other new, concerning symptoms, come back immediately. If no improvement in symptoms by the end of your antibiotic treatment, follow up with your primary care doctor.    Otherwise, simply follow up as needed.        Urinary Tract Infections in Women  Urinary tract infections (UTIs) are most often caused by bacteria (germs). These bacteria enter the urinary tract. The bacteria may come  from outside the body. Or they may travel from the skin outside the rectum or vagina into the urethra. Female anatomy makes it easier for bacteria from the bowel to enter a woman s urinary tract, which is the most common source of UTI. This means women develop UTIs more often than men. Pain in or around the urinary tract is a common UTI symptom. But the only way to know for sure if you have a UTI for the health care provider to test your urine. The two tests that may be done are the urinalysis and urine culture.  Types of UTIs    Cystitis: A bladder infection (cystitis) is the most common UTI in women. You may have urgent or frequent urination. You may also have pain, burning when you urinate, and bloody urine.    Urethritis: This is an inflamed urethra, which is the tube that carries urine from the bladder to outside the body. You may have lower stomach or back pain. You may also have urgent or frequent urination.    Pyelonephritis: This is a kidney infection. If not treated, it can be serious and damage your kidneys. In severe cases, you may be hospitalized. You may have a fever and lower back pain.  Medications to treat a UTI  Most UTIs are treated with antibiotics. These kill the bacteria. The length of time you need to take them depends on the type of infection. It may be as short as 3 days. If you have repeated UTIs, a low-dose antibiotic may be needed for several months. Take antibiotics exactly as directed. Don t stop taking them until all of the medication is gone. If you stop taking the antibiotic too soon, the infection may not go away, and you may develop a resistance to the antibiotic. This can make it much harder to treat.  Lifestyle changes to treat and prevent UTIs  The lifestyle changes below will help get rid of your UTI. They may also help prevent future UTIs.    Drink plenty of fluids. This includes water, juice, or other caffeine-free drinks. Fluids help flush bacteria out of your body.    Empty  your bladder. Always empty your bladder when you feel the urge to urinate. And always urinate before going to sleep. Urine that stays in your bladder can lead to infection. Try to urinate before and after sex as well.    Practice good personal hygiene. Wipe yourself from front to back after using the toilet. This helps keep bacteria from getting into the urethra.    Use condoms during sex. These help prevent UTIs caused by sexually transmitted bacteria. Also, avoid using spermicides during sex. These can increase the risk of UTIs. Choose other forms of birth control instead. For women who tend to get UTIs after sex, a low-dose of a preventive antibiotic may be used. Be sure to discuss this option with your health care provider.    Follow up with your health care provider as directed. He or she may test to make sure the infection has cleared. If necessary, additional treatment may be started.    6936-3450 [employer]. [address] [place] [address]. All rights reserved. This information is not intended as a substitute for professional medical care. Always follow your healthcare professional's instructions.                      SUBJECTIVE:   Glenna Estrada is a 68 y.o. female with a history of hypothyroidism, HTN, obesity, T2DM, who presents today for evaluation of dysuria and urinary frequency, urgency x 2 days. It is not getting any better. She has had some low back pain but has a history of LBP she says. Denies fever, flank pain, nausea/vomiting, hematuria, abdominal pain.  She is not taking anything for sxs. She does not get recurrent UTIs. Last was last spring.     Past Medical History:  Patient Active Problem List   Diagnosis     Essential Hypertension     Hypothyroidism     Obesity     Anemia     Osteoarthritis     Nontropical (Celiac) Sprue     Venous stasis of lower extremity     S/P total hip arthroplasty     History of DVT (deep vein thrombosis)     Vitamin D deficiency     History  of gastric bypass     Scar condition and fibrosis of skin     Acquired lymphedema of leg     Onychomycosis of toenail     Osteoporosis     H/O colonoscopy recommend follow up 2018     Overactive bladder       Surgical History:  Reviewed; Non-contributory    Family History:  Family History   Problem Relation Age of Onset     Breast cancer Mother      Heart disease Father      Early death Father      Pancreatic cancer Sister      Heart disease Brother      Heart disease Paternal Grandfather      Reviewed; Non-contributory      Social History:    Social History     Tobacco Use   Smoking Status Former Smoker     Packs/day: 1.50     Years: 20.00     Pack years: 30.00     Types: Cigarettes     Last attempt to quit: 1992     Years since quittin.0   Smokeless Tobacco Never Used         Current Medications:  Current Outpatient Medications on File Prior to Visit   Medication Sig Dispense Refill     acetaminophen (ARTHRITIS PAIN RELIEF, ACETAM,) 650 MG CR tablet Take 650 mg by mouth every 8 (eight) hours as needed for pain.       amLODIPine (NORVASC) 10 MG tablet TAKE ONE TABLET EVERY DAY 90 tablet 4     ascorbic acid (ASCORBIC ACID WITH FELICE HIPS) 500 MG tablet Take 500 mg by mouth daily.       aspirin 81 MG EC tablet Take 81 mg by mouth daily.       calcium citrate-vitamin D (CITRACAL+D) 315-200 mg-unit per tablet Take 1 tablet by mouth 2 (two) times a day.       cholecalciferol, vitamin D3, 5,000 unit Tab Take 1 tablet by mouth daily.       cyanocobalamin, vitamin B-12, (VITAMIN B-12) 5,000 mcg Subl Place 5,000 mcg under the tongue daily.       ferrous sulfate 325 (65 FE) MG tablet Take 650 mg by mouth daily with breakfast.        folic acid (FOLVITE) 800 MCG tablet Take 800 mcg by mouth daily.        gemfibrozil (LOPID) 600 MG tablet TAKE ONE TABLET BY MOUTH TWICE DAILY  180 tablet 2     glucosamine-chondroitin 500-400 mg tablet Take 1 tablet by mouth daily.       Lactobacillus rhamnosus GG (CULTURELLE) 10-15  Billion cell capsule Take 1 capsule by mouth daily.       levothyroxine (SYNTHROID) 150 MCG tablet Take 1 tablet (150 mcg total) by mouth daily. 30 tablet 0     lisinopril (PRINIVIL,ZESTRIL) 20 MG tablet Take 1 tablet (20 mg total) by mouth 2 (two) times a day. 60 tablet 11     metoprolol succinate (TOPROL XL) 50 MG 24 hr tablet Take 1.5 tabs po daily 45 tablet 11     multivitamin with minerals tablet Take 1 tablet by mouth 2 (two) times a day.       oxybutynin (DITROPAN) 5 MG tablet Take 1 tablet (5 mg total) by mouth 2 (two) times a day. 60 tablet 11     vit A,C & E-lutein-minerals (OCUVITE WITH LUTEIN) 1,000 unit-200 mg-60 unit-2 mg Tab Take 1 tablet by mouth daily.       No current facility-administered medications on file prior to visit.        Allergies:   Allergies   Allergen Reactions     Penicillins Hives     Latex Rash     Pt states she can tolerate short-term     Silver Rash       I personally reviewed patient's past medical, surgical, social, family history and allergies.    ROS:  Review of Systems  An 8point review of systems was conducted and otherwise negative unless noted in HPI.          OBJECTIVE:   /68 (Patient Site: Left Arm, Patient Position: Sitting, Cuff Size: Adult Large)   Pulse (!) 59   Temp 97.9  F (36.6  C) (Oral)   Resp 20   Wt (!) 265 lb 4.8 oz (120.3 kg)   SpO2 97%   BMI 44.15 kg/m        General Appearance:  Alert, well-appearing female in NAD. Afebrile.    Integument: Warm, dry.  HEENT: Moist mucus membranes.  Respiratory: No distress. Lungs clear to ausculation bilaterally. No crackles, wheezes, rhonchi or stridor.  Cardiovascular: Regular rate and rhythm, no murmur, rub or gallop. No obvious chest wall deformities.   : no CVA tenderness.  Neurologic: Alert and orientated appropriately. No focal deficits. Follows commands.          Radiology:  I personally ordered and viewed this study. I agree with below radiology findings.    No results found.      Laboratory  Studies:  I personally ordered and interpreted these studies.    Results for orders placed or performed in visit on 11/21/18   Urinalysis-UC if Indicated   Result Value Ref Range    Color, UA Yellow Colorless, Yellow, Straw, Light Yellow    Clarity, UA Clear Clear    Glucose, UA Negative Negative    Bilirubin, UA Negative Negative    Ketones, UA Negative Negative    Specific Gravity, UA 1.010 1.005 - 1.030    Blood, UA Trace (!) Negative    pH, UA 6.0 5.0 - 8.0    Protein, UA Negative Negative mg/dL    Urobilinogen, UA 0.2 E.U./dL 0.2 E.U./dL, 1.0 E.U./dL    Nitrite, UA Negative Negative    Leukocytes, UA Moderate (!) Negative    Bacteria, UA None Seen None Seen hpf    RBC, UA 3-5 (!) None Seen, 0-2 hpf    WBC, UA 10-25 (!) None Seen, 0-5 hpf    Squam Epithel, UA 5-10 (!) None Seen, 0-5 lpf    WBC Clumps Present (!) None Seen

## 2021-06-21 NOTE — PROGRESS NOTES
Urine culture sensitivities reviewed, with treatment using trimethoprim/sulfamethoxazole being appropriate per results.

## 2021-06-22 NOTE — TELEPHONE ENCOUNTER
RN cannot approve Refill Request    RN can NOT refill this medication med is not covered by policy/route to provider.     Last office visit: 7/16/2018 Carey Townsend MD Last Physical: Visit date not found Last MTM visit: Visit date not found Last visit same specialty: 7/16/2018 Carey Townsend MD.  Next visit within 3 mo: Visit date not found  Next physical within 3 mo: Visit date not found      Jose Pichardo, [facility] Triage/Med Refill 1/3/2019    Requested Prescriptions   Pending Prescriptions Disp Refills     oxybutynin (DITROPAN) 5 MG tablet [Pharmacy Med Name: Oxybutynin Chloride Oral Tablet 5 MG] 60 tablet 10     Sig: TAKE ONE TABLET BY MOUTH TWICE DAILY    There is no refill protocol information for this order

## 2021-06-24 NOTE — TELEPHONE ENCOUNTER
Refill Approved    Rx renewed per Medication Renewal Policy. Medication was last renewed on 12/18/17.    Chanda Armas, [facility] Triage/Med Refill 3/4/2019     Requested Prescriptions   Pending Prescriptions Disp Refills     amLODIPine (NORVASC) 10 MG tablet [Pharmacy Med Name: amLODIPine Besylate Oral Tablet 10 MG] 90 tablet 3     Sig: TAKE ONE TABLET BY MOUTH ONE TIME DAILY    Calcium-Channel Blockers Protocol Passed - 3/3/2019 12:10 AM       Passed - PCP or prescribing provider visit in past 12 months or next 3 months    Last office visit with prescriber/PCP: 7/16/2018 Carey Townsend MD OR same dept: 7/16/2018 Carey Townsend MD OR same specialty: 7/16/2018 Carey Townsend MD  Last physical: Visit date not found Last MTM visit: Visit date not found   Next visit within 3 mo: Visit date not found  Next physical within 3 mo: Visit date not found  Prescriber OR PCP: Carey Townsend MD  Last diagnosis associated with med order: 1. Essential hypertension  - amLODIPine (NORVASC) 10 MG tablet [Pharmacy Med Name: amLODIPine Besylate Oral Tablet 10 MG]; TAKE ONE TABLET BY MOUTH ONE TIME DAILY   Dispense: 90 tablet; Refill: 3    If protocol passes may refill for 12 months if within 3 months of last provider visit (or a total of 15 months).            Passed - Blood pressure filed in past 12 months    BP Readings from Last 1 Encounters:   11/21/18 136/68

## 2021-06-24 NOTE — TELEPHONE ENCOUNTER
RN cannot approve Refill Request    RN can NOT refill this medication overdue for office visits and/or labs.    Timbo Rucker, [facility] Triage/Med Refill 3/12/2019    Requested Prescriptions   Pending Prescriptions Disp Refills     lisinopril (PRINIVIL,ZESTRIL) 20 MG tablet [Pharmacy Med Name: Lisinopril Oral Tablet 20 MG] 60 tablet 10     Sig: TAKE ONE TABLET BY MOUTH TWICE DAILY    Ace Inhibitors Refill Protocol Failed - 3/10/2019  3:08 AM       Failed - Serum Potassium in past 12 months    No results found for: LN-POTASSIUM         Failed - Serum Creatinine in past 12 months    Creatinine   Date Value Ref Range Status   11/22/2017 0.62 0.60 - 1.10 mg/dL Final            Passed - PCP or prescribing provider visit in past 12 months      Last office visit with prescriber/PCP: 7/16/2018 Carey Townsend MD OR same dept: 7/16/2018 Carey Townsend MD OR same specialty: 7/16/2018 Carey Townsend MD  Last physical: Visit date not found Last MTM visit: Visit date not found   Next visit within 3 mo: Visit date not found  Next physical within 3 mo: Visit date not found  Prescriber OR PCP: Carey Townsend MD  Last diagnosis associated with med order: 1. Essential hypertension  - lisinopril (PRINIVIL,ZESTRIL) 20 MG tablet [Pharmacy Med Name: Lisinopril Oral Tablet 20 MG]; TAKE ONE TABLET BY MOUTH TWICE DAILY   Dispense: 60 tablet; Refill: 10    If protocol passes may refill for 12 months if within 3 months of last provider visit (or a total of 15 months).            Passed - Blood pressure filed in past 12 months    BP Readings from Last 1 Encounters:   11/21/18 136/68

## 2021-06-29 NOTE — PROGRESS NOTES
Progress Notes by Charmaine Hinton PA-C at 5/14/2020  1:10 PM     Author: Charmaine Hinton PA-C Service: -- Author Type: Physician Assistant    Filed: 5/14/2020  4:47 PM Encounter Date: 5/14/2020 Status: Addendum    : Charmaine Hinton PA-C (Physician Assistant)    Related Notes: Original Note by Charmaine Hinton PA-C (Physician Assistant) filed at 5/14/2020  3:00 PM       Preoperative Exam    Scheduled Procedure: Robotic Assisted Right Colectomy   Surgery Date:  5/22/2020  Surgery Location: [facility], fax [phone]    Surgeon:  Dr. Hammond     Assessment/Plan:     1. Pre-op exam  Electrocardiogram Perform - Clinic   2. History of colonic polyps     3. Type 2 diabetes mellitus without complication, without long-term current use of insulin (H)  Basic Metabolic Panel    Glycosylated Hemoglobin A1c    HM2(CBC w/o Differential)     1.  EKG today appears unchanged since previous, will have cardiology reading.  2.  She will be having a colectomy, BMP, CBC.  3.  Recheck A1c today, patient defers 10 point foot exam, will follow-up with Dr. Pires in the next 2 weeks for diabetes follow-up.    Surgical Procedure Risk: Low (reported cardiac risk generally < 1%)  Have you had prior anesthesia?: Yes  Have you or any family members had a previous anesthesia reaction:  No  Do you or any family members have a history of a clotting or bleeding disorder?: Yes: Way back had a blood clot, but was smoking and birth control pills  Cardiac Risk Assessment: no increased risk for major cardiac complications    APPROVAL GIVEN to proceed with proposed procedure, without further diagnostic evaluation    Please Note:  no current apnea concern.    Functional Status: Independent  Patient plans to recover at home alone. But daughters live close so they will be checking in on her.     Subjective:      Glenna Estrada is a 69 y.o. female who presents for a preoperative consultation.  She has a history of  diabetes and recent colonoscopy which found a large pre-cancerous polyp and needs a colectomy to remove this polyp.  She has no history of CAD, chest pain, palpitations or arrythmias.  ROS: Patient denies fever, chills, sweats, fainting, fatigue, weight change, dizziness, sleep problems, chest pain, palpitations, shortness of breath, wheezing, cough,  sore throat, changes in hearing, ear pain,tinnitus,  disphagia, sore throat, globus, changes in vision, eye pain eye redness, acid reflux, nausea, vomiting,  constipation, black or bloody stools,  Dysuria, frequency, urinary incontinence, nocturia, hematuria, back pain,joint pain, bone pain, muscle cramps,edema, weakness, numbness, tingling of extremities, rash, itching, skin changes, swollen lymph nodes, thirst, increased urination, breast lumps, breast pain, nipple discharge, memory difficulties, anxiety, mood swings, (female)vaginal discharge.  All other systems reviewed and are negative, other than those listed in the HPI.    Pertinent History  Do you have difficulty breathing or chest pain after walking up a flight of stairs: No  History of obstructive sleep apnea: Yes: But has not used machine, gastro bipass made it better  Steroid use in the last 6 months: No  Frequent Aspirin/NSAID use: Low dose 81mg daily aspirin  Prior Blood Transfusion: Yes: a while ago because she had anemia   Prior Blood Transfusion Reaction: Yes: Rash  If for some reason prior to, during or after the procedure, if it is medically indicated, would you be willing to have a blood transfusion?:  There is no transfusion refusal.    Current Outpatient Medications   Medication Sig Dispense Refill   ? acetaminophen (ARTHRITIS PAIN RELIEF, ACETAM,) 650 MG CR tablet Take 650 mg by mouth every 8 (eight) hours as needed for pain.     ? amLODIPine (NORVASC) 10 MG tablet TAKE ONE TABLET BY MOUTH ONE TIME DAILY  90 tablet 2   ? ascorbic acid (ASCORBIC ACID WITH FELICE HIPS) 500 MG tablet Take 500 mg by  mouth daily.     ? aspirin 81 MG EC tablet Take 81 mg by mouth daily.     ? calcium citrate-vitamin D (CITRACAL+D) 315-200 mg-unit per tablet Take 1 tablet by mouth 2 (two) times a day.     ? cholecalciferol, vitamin D3, 5,000 unit Tab Take 1 tablet by mouth daily.     ? cyanocobalamin, vitamin B-12, (VITAMIN B-12) 5,000 mcg Subl Place 5,000 mcg under the tongue daily.     ? ferrous sulfate 325 (65 FE) MG tablet Take 650 mg by mouth daily with breakfast.      ? folic acid (FOLVITE) 800 MCG tablet Take 800 mcg by mouth daily.      ? gemfibroziL (LOPID) 600 MG tablet TAKE ONE TABLET BY MOUTH TWICE DAILY  180 tablet 2   ? glucosamine-chondroitin 500-400 mg tablet Take 1 tablet by mouth daily.     ? Lactobacillus rhamnosus GG (CULTURELLE) 10-15 Billion cell capsule Take 1 capsule by mouth daily.     ? levothyroxine (SYNTHROID, LEVOTHROID) 150 MCG tablet Take 1 tablet (150 mcg total) by mouth daily. 90 tablet 3   ? lisinopril (PRINIVIL,ZESTRIL) 20 MG tablet Take 1 tablet (20 mg total) by mouth 2 (two) times a day. 180 tablet 2   ? multivitamin with minerals tablet Take 1 tablet by mouth 2 (two) times a day.     ? oxybutynin (DITROPAN) 5 MG tablet Take 1 tablet (5 mg total) by mouth 2 (two) times a day. 180 tablet 2   ? thiamine 100 MG tablet Take 1 tablet (100 mg total) by mouth daily. 90 tablet prn   ? vit A,C & E-lutein-minerals (OCUVITE WITH LUTEIN) 1,000 unit-200 mg-60 unit-2 mg Tab Take 1 tablet by mouth daily.     ? metoprolol succinate (TOPROL-XL) 50 MG 24 hr tablet Take 1.5 tablets (75 mg total) by mouth daily. 135 tablet 2     No current facility-administered medications for this visit.         Allergies   Allergen Reactions   ? Penicillins Hives   ? Latex Rash     Pt states she can tolerate short-term   ? Silver Rash       Patient Active Problem List   Diagnosis   ? Essential Hypertension   ? Hypothyroidism   ? Obesity   ? Anemia   ? Osteoarthritis   ? Nontropical (Celiac) Sprue   ? Venous stasis of lower  extremity   ? S/P total hip arthroplasty   ? History of DVT (deep vein thrombosis)   ? Vitamin D deficiency   ? Body mass index (BMI) 45.0-49.9, adult (H)   ? History of gastric bypass   ? Scar condition and fibrosis of skin   ? Acquired lymphedema of leg   ? Type 2 diabetes mellitus without complication, without long-term current use of insulin (H)   ? Onychomycosis of toenail   ? Hyperparathyroidism (H)   ? Osteoporosis   ? H/O colonoscopy recommend follow up 2018   ? Overactive bladder   ? Thiamine deficiency   ? Abnormal colonoscopy       Past Medical History:   Diagnosis Date   ? Arthritis    ? Diabetes mellitus (H)    ? Disease of thyroid gland    ? Dyslipidemia    ? Hip pain    ? History of DVT (deep vein thrombosis)    ? Hyperparathyroidism (H)    ? Knee pain    ? Low back pain    ? Lymphedema    ? Metabolic syndrome    ? Morbid obesity with BMI of 50.0-59.9, adult (H)    ? Other and unspecified postsurgical nonabsorption    ? Ulcer of calf (H) 6/10/2014   ? Venous stasis ulcer (H)        Past Surgical History:   Procedure Laterality Date   ? IN LAP GASTRIC BYPASS/CIERRA-EN-Y N/A 6/9/2014    Procedure: LAPAROSCOPIC CIERRA-EN-Y GASTRIC BYPASS WITH OMENTOPEXY, POSSIBLE OPEN (Room 4710);  Surgeon: Timbo Sweet MD;  [facility];  Service: General   ? ROTATOR CUFF REPAIR     ? TONSILLECTOMY AND ADENOIDECTOMY     ? TOTAL HIP ARTHROPLASTY Left 12/31/2014    Procedure: LEFT TOTAL HIP ARTHROPLASTY;  Surgeon: Landon Canales MD;  [facility];  Service:    ? TUBAL LIGATION Bilateral    ? VASCULAR SURGERY  June 2014    Right leg skin graft       Social History     Socioeconomic History   ? Marital status:      Spouse name: Not on file   ? Number of children: Not on file   ? Years of education: Not on file   ? Highest education level: Not on file   Occupational History   ? Not on file   Social Needs   ? Financial resource strain: Not on file   ? Food insecurity     Worry:  "Not on file     Inability: Not on file   ? Transportation needs     Medical: Not on file     Non-medical: Not on file   Tobacco Use   ? Smoking status: Former Smoker     Packs/day: 1.50     Years: 20.00     Pack years: 30.00     Types: Cigarettes     Last attempt to quit: 1992     Years since quittin.5   ? Smokeless tobacco: Never Used   Substance and Sexual Activity   ? Alcohol use: Yes     Comment: rarely   ? Drug use: No   ? Sexual activity: Not Currently     Birth control/protection: Post-menopausal   Lifestyle   ? Physical activity     Days per week: Not on file     Minutes per session: Not on file   ? Stress: Not on file   Relationships   ? Social connections     Talks on phone: Not on file     Gets together: Not on file     Attends Sabianist service: Not on file     Active member of club or organization: Not on file     Attends meetings of clubs or organizations: Not on file     Relationship status: Not on file   ? Intimate partner violence     Fear of current or ex partner: Not on file     Emotionally abused: Not on file     Physically abused: Not on file     Forced sexual activity: Not on file   Other Topics Concern   ? Not on file   Social History Narrative   ? Not on file       Patient Care Team:  Jacqui Molina MD as PCP - General (Family Medicine)  Jacqui Molina MD as Assigned PCP          Objective:     Vitals:    20 1311   BP: 146/80   Weight: (!) 272 lb (123.4 kg)   Height: 5' 6\" (1.676 m)         Physical Exam:  Alert, cooperative, well-hydrated.  Appears well.  Eyes: Pupils equal, round, reactive to light.  HEENT: Sclera white, nares patent, MMM   Lungs: Clear to auscultation. No retractions, no increased work of respiration, equal chest rise.   Heart: Regular rate and rhythm, no murmurs, clicks,    Gallops.  Abdomen: Soft, bowel sounds in 4 quadrants with no tenderness to palpation, no organomegaly or masses, no aortic or renal bruits.  Extremities: no tenderness to palpation " of gastrocnemius, bilaterally.  Skin: no increased warmth, edema, or erythema of lower legs bilaterally.  Back:  No cervical, thoracic or lumbar tenderness to spinous processes or musculature.  Neuro: pupils equal and reactive to light bilaterally, CN II - XII  intact. No focal motor/sensory deficits.      There are no Patient Instructions on file for this visit.  Electrocardiogram Perform - Clinic   Order: [id number]   Status:  Final result   Visible to patient:  No (not released) Next appt:  None Dx:  Pre-op exam     Ref Range & Units  5/14/20 1350 12/31/14 1153     SYSTOLIC BLOOD PRESSURE     R       DIASTOLIC BLOOD PRESSURE     R       VENTRICULAR RATE  BPM  65   61       ATRIAL RATE  BPM  65   61       P-R INTERVAL  ms  186   170       QRS DURATION  ms  96   86       Q-T INTERVAL  ms  414   406       QTC CALCULATION (BEZET)  ms  430   408       P Axis  degrees  54   89       R AXIS  degrees  27   41       T AXIS  degrees  83   40       MUSE DIAGNOSIS   Sinus rhythm with Premature atrial complexes   Nonspecific T wave abnormality   Abnormal ECG   When compared with ECG of 31-DEC-2014 11:53,   Premature atrial complexes are now Present   Nonspecific T wave abnormality now evident in Lateral leads   Confirmed by RAD KELLER, LES LOC: ([id number]) on 5/14/2020 4:03:55 PM                   Labs:  Recent Results (from the past 120 hour(s))   Electrocardiogram Perform - Clinic    Collection Time: 05/14/20  1:50 PM   Result Value Ref Range    SYSTOLIC BLOOD PRESSURE      DIASTOLIC BLOOD PRESSURE      VENTRICULAR RATE 65 BPM    ATRIAL RATE 65 BPM    P-R INTERVAL 186 ms    QRS DURATION 96 ms    Q-T INTERVAL 414 ms    QTC CALCULATION (BEZET) 430 ms    P Axis 54 degrees    R AXIS 27 degrees    T AXIS 83 degrees    MUSE DIAGNOSIS       Sinus rhythm with Premature atrial complexes  Nonspecific T wave abnormality  Abnormal ECG  When compared with ECG of 31-DEC-2014 11:53,  Premature atrial complexes are now Present  Nonspecific T  wave abnormality now evident in Lateral leads         Immunization History   Administered Date(s) Administered   ? Td,adult,historic,unspecified 07/19/1999, 09/29/2008   ? Tdap 09/29/2008           Electronically signed by Charmaine Hinton PA-C 05/14/20 1:12 PM

## 2021-07-03 NOTE — ADDENDUM NOTE
Addendum Note by Memo Kraus MD at 5/25/2020  5:59 PM     Author: Memo Kraus MD Service: -- Author Type: Physician    Filed: 5/25/2020  5:59 PM Date of Service: 5/25/2020  5:59 PM Status: Signed    : Memo Kraus MD (Physician)       Addendum  created 05/25/20 1759 by Memo Kraus MD    Attestation recorded in Intraprocedure, Intraprocedure Attestations filed

## 2021-08-06 NOTE — PATIENT INSTRUCTIONS - HE
Patient Instructions by Jacqui Molina MD at 1/21/2021 11:00 AM     Author: Jacqui Molina MD Service: -- Author Type: Physician    Filed: 1/21/2021 11:15 AM Encounter Date: 1/21/2021 Status: Signed    : Jacqui Molina MD (Physician)         Patient Education     Exercise for a Healthier Heart  You may wonder how you can improve the health of your heart. If youre thinking about exercise, youre on the right track. You dont need to become an athlete, but you do need a certain amount of brisk exercise to help strengthen your heart. If you have been diagnosed with a heart condition, your doctor may recommend exercise to help stabilize your condition. To help make exercise a habit, choose safe, fun activities.       Be sure to check with your health care provider before starting an exercise program.    Why exercise?  Exercising regularly offers many healthy rewards. It can help you do all of the following:    Improve your blood cholesterol levels to help prevent further heart trouble    Lower your blood pressure to help prevent a stroke or heart attack    Control diabetes, or reduce your risk of getting this disease    Improve your heart and lung function    Reach and maintain a healthy weight    Make your muscles stronger and more limber so you can stay active    Prevent falls and fractures by slowing the loss of bone mass (osteoporosis)    Manage stress better  Exercise tips  Ease into your routine. Set small goals. Then build on them.  Exercise on most days. Aim for a total of 150 or more minutes of moderate to  vigorous intensity activity each week. Consider 40 minutes, 3 to 4 times a week. For best results, activity should last for 40 minutes on average. It is OK to work up to the 40 minute period over time. Examples of moderate-intensity activity is walking one mile in 15 minutes or 30 to 45 minutes of yard work.  Step up your daily activity level. Along with your exercise program, try being more active  throughout the day. Walk instead of drive. Do more household tasks or yard work.  Choose one or more activities you enjoy. Walking is one of the easiest things you can do. You can also try swimming, riding a bike, or taking an exercise class.  Stop exercising and call your doctor if you:    Have chest pain or feel dizzy or lightheaded    Feel burning, tightness, pressure, or heaviness in your chest, neck, shoulders, back, or arms    Have unusual shortness of breath    Have increased joint or muscle pain    Have palpitations or an irregular heartbeat      4389-0263 [employer]. [address] [place] [address]. All rights reserved. This information is not intended as a substitute for professional medical care. Always follow your healthcare professional's instructions.         Patient Education   Understanding [employer] MyPlate  The USDA (US Department of Agriculture) has guidelines to help you make healthy food choices. These are called MyPlate. MyPlate shows the food groups that make up healthy meals using the image of a place setting. Before you eat, think about the healthiest choices for what to put onto your plate or into your cup or bowl. To learn more about building a healthy plate, visit www.choosemyplate.gov.       The Food Groups    Fruits: Any fruit or 100% fruit juice counts as part of the Fruit Group. Fruits may be fresh, canned, frozen, or dried, and may be whole, cut-up, or pureed. Make half your plate fruits and vegetables.    Vegetables: Any vegetable or 100% vegetable juice counts as a member of the Vegetable Group. Vegetables may be fresh, frozen, canned, or dried. They can be served raw or cooked and may be whole, cut-up, or mashed. Make half your plate fruits and vegetables.     Grains: All foods made from grains are part of the Grains Group. These include wheat, rice, oats, cornmeal, and barley such as bread, pasta, oatmeal, cereal, tortillas, and grits. Grains should be no more  than a quarter of your plate. At least half of your grains should be whole grains.    Protein: This group includes meat, poultry, seafood, beans and peas, eggs, processed soy products (like tofu), nuts (including nut butters), and seeds. Make protein choices no more than a quarter of your plate. Meat and poultry choices should be lean or low fat.    Dairy: All fluid milk products and foods made from milk that contain calcium, like yogurt and cheese are part of the Dairy Group. (Foods that have little calcium, such as cream, butter, and cream cheese, are not part of the group.) Most dairy choices should be low-fat or fat-free.    Oils: These are fats that are liquid at room temperature. They include canola, corn, olive, soybean, and sunflower oil. Foods that are mainly oil include mayonnaise, certain salad dressings, and soft margarines. You should have only 5 to 7 teaspoons of oils a day. You probably already get this much from the food you eat.  Use Foodspotting to Help Build Your Meals  The vip.comcker can help you plan and track your meals and activity. You can look up individual foods to see or compare their nutritional value. You can get guidelines for what and how much you should eat. You can compare your food choices. And you can assess personal physical activities and see ways you can improve. Go to www.[employer].gov/supertracker/.    7451-1405 [employer]. [address] [place] [address]. All rights reserved. This information is not intended as a substitute for professional medical care. Always follow your healthcare professional's instructions.           Patient Education   Signs of Hearing Loss  Hearing loss is a problem shared by many people. In fact, it is one of the most common health conditions, particularly as people age. Most people over age 65 have some hearing loss, and by age 80, almost everyone does. Because hearing loss usually occurs slowly over the years, you may  not realize your hearing ability has gotten worse.       Have your hearing checked  Contact your [facility] care provider if you:    Have to strain to hear normal conversation.    Have to watch other peoples faces very carefully to follow what theyre saying.    Need to ask people to repeat what theyve said.    Often misunderstand what people are saying.    Turn the volume of the television or radio up so high that others complain.    Feel that people are mumbling when theyre talking to you.    Find that the effort to hear leaves you feeling tired and irritated.    Notice, when using the phone, that you hear better with 1 ear than the other.    9860-5334 [employer]. [address] [place] [address], [place] [address]. All rights reserved. This information is not intended as a substitute for professional medical care. Always follow your healthcare professional's instructions.         Patient Education   Urinary Incontinence, Female (Adult)  Urinary incontinence means loss of control of the bladder. This problem affects many women, especially as they get older. If you have incontinence, you may be embarrassed to ask for help. But know that this problem can be treated.  Types of Incontinence  There are different types of incontinence. Two of the main types are described here. You can have more than one type.    Stress incontinence. With this type, urine leaks when pressure (stress) is put on the bladder. This may happen when you cough, sneeze, or laugh. Stress incontinence most often occurs because the pelvic floor muscles that support the bladder and urethra are weak. This can happen after pregnancy and vaginal childbirth or a hysterectomy. It can also be due to excess body weight or hormone changes.    Urge incontinence (also called overactive bladder). With this type, a sudden urge to urinate is felt often. This may happen even though there may not be much urine in the bladder. The need to urinate often during the  night is common. Urge incontinence most often occurs because of bladder spasms. This may be due to bladder irritation or infection. Damage to bladder nerves or pelvic muscles, constipation, and certain medicines can also lead to urge incontinence.  Treatment of urinary incontinence depends on the cause. Further evaluation is needed to find the type you have. This will likely include an exam and certain tests. Based on the results, you and your healthcare provider can then plan treatment. Until a diagnosis is made, the home care tips below can help relieve symptoms.  Home care    Do pelvic floor muscle exercises, if they are prescribed. The pelvic floor muscles help support the bladder and urethra. Many women find that their symptoms improve when doing special exercises that strengthen these muscles. To do the exercises contract the muscles you would use to stop your stream of urine, but do this when youre not urinating. Hold for 10 seconds, then relax. Repeat 10 to 20 times in a row, at least 3 times a day. Your provider may give you other instructions for how to do the exercises and how often.    Keep a bladder diary. This helps track how often and how much you urinate over a set period of time. Bring this diary with you to your next visit with the provider. The information can help your provider learn more about your bladder problem.    Lose weight, if advised to by your provider. Excess weight puts pressure on the bladder. Your provider can help you create a weight-loss plan thats right for you. This may include exercising more and making certain diet changes.    Don't consume foods and drinks that may irritate the bladder. These can include alcohol and caffeinated drinks.    Quit smoking. Smoking and other tobacco use can lead to chronic cough that strains the pelvic floor muscles. Smoking may also damage the bladder and urethra. Talk with your provider about treatments or methods you can use to quit  smoking.    If drinking large amounts of fluid causes you to have symptoms, you may be advised to limit your fluid intake. You may also be advised to drink most of your fluids during the day and to limit fluids at night.    If youre worried about urine leakage or accidents, you may wear absorbent pads to catch urine. Change the pads often. This helps reduce discomfort. It may also reduce the risk of skin or bladder infections.  Follow-up care  Follow up with your healthcare provider, or as directed. It may take some to find the right treatment for your problem. Your treatment plan may include special therapies or medicines. Certain procedures or surgery may also be options. Be sure to discuss any questions you have with your provider.  When to seek medical advice  Call the healthcare provider right away if any of these occur:    Fever of 100.4 F (38 C) or higher, or as directed by your provider    Bladder pain or fullness    Abdominal swelling    Nausea or vomiting    Back pain    Weakness, dizziness or fainting  Date Last Reviewed: 10/1/2017    9359-9984 [employer]. [address] [place] [address]. All rights reserved. This information is not intended as a substitute for professional medical care. Always follow your healthcare professional's instructions.     Patient Education     Kegel Exercises  Kegel exercises dont need special clothing or equipment. Theyre easy to learn and simple to do. And if you do them right, no one can tell youre doing them, so they can be done almost anywhere. Your healthcare provider, nurse, or physical therapist can answer any questions you have and help you get started.    A weak pelvic floor  The pelvic floor muscles may weaken due to aging, pregnancy and vaginal childbirth, injury, surgery, chronic cough, or lack of exercise. If the pelvic floor is weak, your bladder and other pelvic organs may sag out of place. The urethra may also open too easily and allow  urine to leak out. Kegel exercises can help you strengthen your pelvic floor muscles. Then they can better support the pelvic organs and control urine flow.  How Kegel exercises are done  Try each of the Kegel exercises described below. When youre doing them, try not to move your leg, buttock, or stomach muscles:    Contract as if you were stopping your urine stream. But do it when youre not urinating.    Tighten your rectum as if trying not to pass gas. Contract your anus, but dont move your buttocks.    You may place a finger or 2 in the vagina and squeeze your finger with your vagina to learn which muscles to tighten.  Try to hold each Kegel for a slow count to 5. You probably wont be able to hold them for that long at first. But keep practicing. It will get easier as your pelvic floor gets stronger. Eventually, special weights that you place in your vagina may be recommended to help make your Kegels even more effective. Visit your healthcare provider if you have difficulties doing Kegel exercises.  Helpful hints  Here are some tips to follow:    Do your Kegels as often as you can. The more you do them, the faster youll feel the results.    Pick an activity you do often as a reminder. For instance, do your Kegels every time you sit down.    Tighten your pelvic floor before you sneeze, get up from a chair, cough, laugh, or lift. This protects your pelvic floor from injury and can help prevent urine leakage.   Date Last Reviewed: 10/1/2017    5446-7466 [employer]. [address] [place] [address], [place] [id number]. All rights reserved. This information is not intended as a substitute for professional medical care. Always follow your healthcare professional's instructions.     Patient Education   Your Health Risk Assessment indicates you feel you are not in good emotional health.    Recreation   Recreation is not limited to sports and team events. It includes any activity that provides relaxation, interest,  enjoyment, and exercise. Recreation provides an outlet for physical, mental, and social energy. It can give a sense of worth and achievement. It can help you stay healthy.    Mental Exercise and Social Involvement  Mental and emotional health is as important as physical health. Keep in touch with friends and family. Stay as active as possible. Continue to learn and challenge yourself.   Things you can do to stay mentally active are:    Learn something new, like a foreign language or musical instrument.     Play SCRABBLE or do crossword puzzles. If you cannot find people to play these games with you at home, you can play them with others on your computer through the Internet.     Join a games club--anything from card games to chess or checkers or lawn bowling.     Start a new hobby.     Go back to school.     Volunteer.     Read.     Keep up with world events.       Patient Education   Understanding Advance Care Planning  Advance care planning is the process of deciding ones own future medical care. It helps ensure that if you cant speak for yourself, your wishes can still be carried out. The plan is a series of legal documents that note a persons wishes. The documents vary by state. Advance care planning may be done when a person has a serious illness that is expected to get worse. It may be done before major surgery. And it can help you and your family be prepared in case of a major illness or injury. Advance care planning helps with making decisions at these times.       A health care proxy is a person who acts as the voice of a patient when the patient cant speak for himself or herself. The name of this role varies by state. It may be called a Durable Medical Power of  or Durable Power of  for Healthcare. It may be called an agent, surrogate, or advocate. Or it may be called a representative or decision maker. It is an official duty that is identified by a legal document. The document also varies  by state.    Why Is Advance Care Planning Important?  If a person communicates their healthcare wishes:    They will be given medical care that matches their values and goals.    Their family members will not be forced to make decisions in a crisis with no guidance.  Creating a Plan  Making an advance care plan is often done in 3 steps:    Thinking about ones wishes. To create an advance care plan, you should think about what kind of medical treatment you would want if you lose the ability to communicate. Are there any situations in which you would refuse or stop treatment? Are there therapies you would want or not want? And whom do you want to make decisions for you? There are many places to learn more about how to plan for your care. Ask your doctor or  for resources.    Picking a health care proxy. This means choosing a trusted person to speak for you only when you cant speak for yourself. When you cannot make medical decisions, your proxy makes sure the instructions in your advance care plan are followed. A proxy does not make decisions based on his or her own opinions. They must put aside those opinions and values if needed, and carry out your wishes.    Filling out the legal documents. There are several kinds of legal documents for advance care planning. Each one tells health care providers your wishes. The documents may vary by state. They must be signed and may need to be witnessed or notarized. You can cancel or change them whenever you wish. Depending on your state, the documents may include a Healthcare Proxy form, Living Will, Durable Medical Power of , Advance Directive, or others.  The Familys Role  The best help a family can give is to support their loved ones wishes. Open and honest communication is vital. Family should express any concerns they have about the patients choices while the patient can still make decisions.    3430-1699 [employer]. [address], [place] [address]. All rights reserved. This information is not intended as a substitute for professional medical care. Always follow your healthcare professional's instructions.         Also, [facility] offers a free, downloadable health care directive that allows you to share your treatment choices and personal preferences if you cannot communicate your wishes. It also allows you to appoint another person (called a health care agent) to make health care decisions if you are unable to do so. You can download an advance directive by going here: [email or web address]     Patient Education   Personalized Prevention Plan  You are due for the preventive services outlined below.  Your care team is available to assist you in scheduling these services.  If you have already completed any of these items, please share that information with your care team to update in your medical record.  Health Maintenance   Topic Date Due   ? HEPATITIS C SCREENING  1950   ? DIABETIC FOOT EXAM  1950   ? ZOSTER VACCINES (1 of 2) 08/20/2001   ? ADVANCE CARE PLANNING  03/29/2016   ? TD 18+ HE  09/29/2018   ? MAMMOGRAM  01/19/2020   ? COLORECTAL CANCER SCREENING  02/06/2021   ? DIABETIC EYE EXAM  03/10/2021   ? A1C  06/07/2021   ? BMP  12/07/2021   ? LIPID  12/07/2021   ? MICROALBUMIN  12/11/2021   ? MEDICARE ANNUAL WELLNESS VISIT  01/21/2022   ? FALL RISK ASSESSMENT  01/21/2022   ? DEXA SCAN  01/21/2035   ? Pneumococcal Vaccine: 65+ Years  Completed   ? INFLUENZA VACCINE RULE BASED  Completed   ? Pneumococcal Vaccine: Pediatrics (0 to 5 Years) and At-Risk Patients (6 to 64 Years)  Aged Out

## 2021-09-10 ENCOUNTER — TELEPHONE (OUTPATIENT)
Dept: FAMILY MEDICINE | Facility: CLINIC | Age: 71
End: 2021-09-10

## 2021-09-10 NOTE — TELEPHONE ENCOUNTER
Pt dropped of handicap parking forms to be completed by Dr Molina.  Directions inside include a pre-addressed and stamped envelope to send it back to the patient.  The form is in the workroom in Dr Molina's inbox.

## 2021-10-11 ENCOUNTER — HEALTH MAINTENANCE LETTER (OUTPATIENT)
Age: 71
End: 2021-10-11

## 2021-11-16 ENCOUNTER — TRANSFERRED RECORDS (OUTPATIENT)
Dept: HEALTH INFORMATION MANAGEMENT | Facility: CLINIC | Age: 71
End: 2021-11-16
Payer: COMMERCIAL

## 2021-11-19 ENCOUNTER — IMMUNIZATION (OUTPATIENT)
Dept: NURSING | Facility: CLINIC | Age: 71
End: 2021-11-19
Payer: COMMERCIAL

## 2021-11-19 PROCEDURE — 91300 PR COVID VAC PFIZER DIL RECON 30 MCG/0.3 ML IM: CPT

## 2021-11-19 PROCEDURE — 0004A PR COVID VAC PFIZER DIL RECON 30 MCG/0.3 ML IM: CPT

## 2021-12-01 ENCOUNTER — APPOINTMENT (OUTPATIENT)
Dept: URGENT CARE | Facility: CLINIC | Age: 71
End: 2021-12-01
Payer: COMMERCIAL

## 2021-12-01 ENCOUNTER — TELEPHONE (OUTPATIENT)
Dept: FAMILY MEDICINE | Facility: CLINIC | Age: 71
End: 2021-12-01

## 2021-12-01 NOTE — TELEPHONE ENCOUNTER
Reason for call:  Other   Patient called regarding (reason for call): prescription  Additional comments: pt requesting medication for UTI. Uses [facility] [employer] [phone]    Phone number to reach patient:  Home number on file [phone] (home)    Best Time:  any    Can we leave a detailed message on this number?  YES    Travel screening: Not Applicable

## 2021-12-02 ENCOUNTER — VIRTUAL VISIT (OUTPATIENT)
Dept: FAMILY MEDICINE | Facility: CLINIC | Age: 71
End: 2021-12-02
Payer: COMMERCIAL

## 2021-12-02 ENCOUNTER — NURSE TRIAGE (OUTPATIENT)
Dept: NURSING | Facility: CLINIC | Age: 71
End: 2021-12-02
Payer: COMMERCIAL

## 2021-12-02 DIAGNOSIS — R30.0 DYSURIA: Primary | ICD-10-CM

## 2021-12-02 PROCEDURE — 99213 OFFICE O/P EST LOW 20 MIN: CPT | Mod: TEL | Performed by: NURSE PRACTITIONER

## 2021-12-02 RX ORDER — ONDANSETRON 4 MG/1
4 TABLET, FILM COATED ORAL EVERY 8 HOURS PRN
Qty: 12 TABLET | Refills: 0 | Status: SHIPPED | OUTPATIENT
Start: 2021-12-02 | End: 2022-05-17

## 2021-12-02 RX ORDER — SULFAMETHOXAZOLE/TRIMETHOPRIM 800-160 MG
1 TABLET ORAL 2 TIMES DAILY
Qty: 14 TABLET | Refills: 0 | Status: SHIPPED | OUTPATIENT
Start: 2021-12-02 | End: 2021-12-09

## 2021-12-02 NOTE — TELEPHONE ENCOUNTER
RN Triage:    Burning with urination, frequency, urgency x 6 d.  No fever or back pain.  Denies abdominal pain.  No blood in urine.  Feels OK otherwise.  Home care and precautions discussed.  Caller was transferred to  for telephone visit.    Ana Maria Norton RN 12/02/21 3:44 PM  [facility] Nurse Advisor          Reason for Disposition    Discomfort (pain, burning or stinging) when passing urine and female    Age > 50 years    Additional Information    Negative: Shock suspected (e.g., cold/pale/clammy skin, too weak to stand, low BP, rapid pulse)    Negative: Sounds like a life-threatening emergency to the triager    Negative: Followed a genital area injury    Negative: Followed a genital area injury (penis, scrotum)    Negative: Vaginal discharge    Negative: Pus (white, yellow) or bloody discharge from end of penis    Negative: Discomfort (pain, burning or stinging) when passing urine and pregnant    Negative: Shock suspected (e.g., cold/pale/clammy skin, too weak to stand, low BP, rapid pulse)    Negative: Sounds like a life-threatening emergency to the triager    Negative: Unable to urinate (or only a few drops) and bladder feels very full    Negative: Vomiting    Negative: Patient sounds very sick or weak to the triager    Negative: SEVERE pain with urination    Negative: Fever > 100.4 F (38.0 C)    Negative: Side (flank) or lower back pain present    Negative: Taking antibiotic > 24 hours for UTI and fever persists    Negative: Taking antibiotic > 3 days for UTI and painful urination not improved    Negative: Unusual vaginal discharge    Negative: > 2 UTIs in last year    Negative: Patient is worried about sexually transmitted disease (STD)    Protocols used: URINARY SYMPTOMS-A-OH, URINATION PAIN - FEMALE-A-OH

## 2021-12-02 NOTE — PROGRESS NOTES
"Glenna is a 71 year old who is being evaluated via a billable telephone visit.      What phone number would you like to be contacted at? [phone]  How would you like to obtain your AVS? MyChart    Assessment & Plan     Dysuria  Increased urinary frequency and hesitancy.  Painful urination.  She seems to get nauseous when taking antibiotics will give her some Zofran to take as needed    - ondansetron (ZOFRAN) 4 MG tablet; Take 1 tablet (4 mg) by mouth every 8 hours as needed for nausea  - sulfamethoxazole-trimethoprim (BACTRIM DS) 800-160 MG tablet; Take 1 tablet by mouth 2 times daily for 7 days    Review of external notes as documented elsewhere in note  8 minutes spent on the date of the encounter doing chart review, history and exam, documentation and further activities per the note     BMI:   Estimated body mass index is 42.93 kg/m  as calculated from the following:    Height as of 1/21/21: 1.676 m (5' 6\").    Weight as of 1/21/21: 120.7 kg (266 lb).   Weight management plan: Patient was referred to their PCP to discuss a diet and exercise plan.    See Patient Instructions    No follow-ups on file.    Cayden Jackson, [facility]   Glenna is a 71 year old who presents for the following health issues     HPI     Painful urination over the last few days.  Feeling like she has to run to the bathroom.  Thinks she might have a UTI.  No hematuria or flank pain.  No nausea or vomiting.        Review of Systems   Constitutional, HEENT, cardiovascular, pulmonary, gi and gu systems are negative, except as otherwise noted.      Objective           Vitals:  No vitals were obtained today due to virtual visit.    Physical Exam   healthy, alert and no distress  PSYCH: Alert and oriented times 3; coherent speech, normal   rate and volume, able to articulate logical thoughts, able   to abstract reason, no tangential thoughts, no hallucinations   or delusions  Her affect is " normal  RESP: No cough, no audible wheezing, able to talk in full sentences  Remainder of exam unable to be completed due to telephone visits          Phone call duration: 7 minutes

## 2021-12-06 NOTE — TELEPHONE ENCOUNTER
"Contacted patient who explained symptoms started one week ago.  States my symptoms were \"not consistent at times.\"  Experienced burning with urination, urgency, and difficulty starting a stream at times.  Denies any hematuria, fever, or chills.  She does feel the symptoms have improved a lot and openly wonders if she still needs any treatment.  Willing to do VV or bring in urine if advised.  Informed patient this will be routed to PCP for further advise.  Patient verbalized understanding.  "

## 2021-12-07 NOTE — TELEPHONE ENCOUNTER
Left message for patient to call back regarding sx. When she calls back please relay message below

## 2021-12-08 ENCOUNTER — VIRTUAL VISIT (OUTPATIENT)
Dept: FAMILY MEDICINE | Facility: CLINIC | Age: 71
End: 2021-12-08
Payer: COMMERCIAL

## 2021-12-08 DIAGNOSIS — M81.0 AGE-RELATED OSTEOPOROSIS WITHOUT CURRENT PATHOLOGICAL FRACTURE: ICD-10-CM

## 2021-12-08 DIAGNOSIS — N39.0 URINARY TRACT INFECTION WITHOUT HEMATURIA, SITE UNSPECIFIED: ICD-10-CM

## 2021-12-08 DIAGNOSIS — Z13.220 SCREENING FOR HYPERLIPIDEMIA: ICD-10-CM

## 2021-12-08 DIAGNOSIS — Z12.11 SCREEN FOR COLON CANCER: ICD-10-CM

## 2021-12-08 DIAGNOSIS — E11.9 TYPE 2 DIABETES MELLITUS WITHOUT COMPLICATION, WITHOUT LONG-TERM CURRENT USE OF INSULIN (H): Primary | ICD-10-CM

## 2021-12-08 PROCEDURE — 99441 PR PHYSICIAN TELEPHONE EVALUATION 5-10 MIN: CPT | Performed by: FAMILY MEDICINE

## 2021-12-08 NOTE — PROGRESS NOTES
"Glenna is a 71 year old who is being evaluated via a billable video visit.      How would you like to obtain your AVS? MyChart  If the video visit is dropped, the invitation should be resent by: Text to cell phone: [phone]  Will anyone else be joining your video visit? No  {If patient encounters technical issues they should call [phone] :269010}    Video Start Time: {video visit start/end time for provider to select:699631}    {PROVIDER CHARTING PREFERENCE:134032}    Subjective   Glenna is a 71 year old who presents for the following health issues {ACCOMPANIED BY STATEMENT (Optional):669468}    History of Present Illness       She eats 2-3 servings of fruits and vegetables daily.She consumes 4 sweetened beverage(s) daily.She exercises with enough effort to increase her heart rate 9 or less minutes per day.  She exercises with enough effort to increase her heart rate 3 or less days per week. She is missing 1 dose(s) of medications per week.  She is not taking prescribed medications regularly due to remembering to take.      {SUPERLIST (Optional):800433}  {additonal problems for provider to add (Optional):137346}    Review of Systems   {ROS COMP (Optional):612046}      Objective           Vitals:  No vitals were obtained today due to virtual visit.    Physical Exam   {video visit exam brief selected:642656::\"GENERAL: Healthy, alert and no distress\",\"EYES: Eyes grossly normal to inspection.  No discharge or erythema, or obvious scleral/conjunctival abnormalities.\",\"RESP: No audible wheeze, cough, or visible cyanosis.  No visible retractions or increased work of breathing.  \",\"SKIN: Visible skin clear. No significant rash, abnormal pigmentation or lesions.\",\"NEURO: Cranial nerves grossly intact.  Mentation and speech appropriate for age.\",\"PSYCH: Mentation appears normal, affect normal/bright, judgement and insight intact, normal speech and appearance well-groomed.\"}    {Diagnostic Test Results " "(Optional):825479}    {AMBULATORY ATTESTATION (Optional):720820}        Video-Visit Details    Type of service:  Video Visit    Video End Time:{video visit start/end time for provider to select:198010}    Originating Location (pt. Location): {video visit patient location:173921::\"Home\"}    Distant Location (provider location):  [facility]     Platform used for Video Visit: {Virtual Visit Platforms:226548::\"Ernie's\"}  "

## 2021-12-08 NOTE — PROGRESS NOTES
Glenna is a 71 year old who is being evaluated via a billable telephone visit.      What phone number would you like to be contacted at? [phone]  How would you like to obtain your AVS? MyChart    Assessment & Plan     Type 2 diabetes mellitus without complication, without long-term current use of insulin (H)  Routine labs and referral for 1/22  - OPTOMETRY REFERRAL  - HEMOGLOBIN A1C  - BASIC METABOLIC PANEL  - Lipid panel reflex to direct LDL Fasting  - Albumin Random Urine Quantitative with Creat Ratio    Screen for colon cancer  scheudled with [facility] for 2/22    Screening for hyperlipidemia  Stable.  Will do labs in january    Age-related osteoporosis without current pathological fracture  Due for DEXA in 1/22.  - DX Hip/Pelvis/Spine  - DX Hip/Pelvis/Spine    Urinary tract infection without hematuria, site unspecified  Resolved.             Return in about 4 weeks (around 1/5/2022).    Jacqui Molina MD  [facility]   Glenna is a 71 year old who presents for the following health issues     History of Present Illness       She eats 2-3 servings of fruits and vegetables daily.She consumes 4 sweetened beverage(s) daily.She exercises with enough effort to increase her heart rate 9 or less minutes per day.  She exercises with enough effort to increase her heart rate 3 or less days per week. She is missing 1 dose(s) of medications per week.  She is not taking prescribed medications regularly due to remembering to take.     Not checking sugars, no shaking or dizziness.  UTI symptoms have disappeared.  Finished medication and only 1 of the zofran.      Due for DEXA, colonoscopy in 1/2022.  Just had the mammogram.            Review of Systems   Constitutional, HEENT, cardiovascular, pulmonary, gi and gu systems are negative, except as otherwise noted.      Objective           Vitals:  No vitals were obtained today due to virtual visit.    Physical Exam   healthy, alert and no  distress  PSYCH: Alert and oriented times 3; coherent speech, normal   rate and volume, able to articulate logical thoughts, able   to abstract reason, no tangential thoughts, no hallucinations   or delusions  Her affect is normal  RESP: No cough, no audible wheezing, able to talk in full sentences  Remainder of exam unable to be completed due to telephone visits                Phone call duration: 8 minutes

## 2021-12-10 DIAGNOSIS — I10 ESSENTIAL HYPERTENSION: ICD-10-CM

## 2021-12-10 DIAGNOSIS — E03.9 HYPOTHYROIDISM, UNSPECIFIED TYPE: ICD-10-CM

## 2021-12-10 DIAGNOSIS — E78.5 DYSLIPIDEMIA: ICD-10-CM

## 2021-12-10 NOTE — TELEPHONE ENCOUNTER
Refill Request: Levothyroxine    Refill Request: Amlodipine     Refill Request: Oxybutynin     Refill Request: Gemfibrozil

## 2021-12-12 NOTE — TELEPHONE ENCOUNTER
"Routing refill request to provider for review/approval because:  Labs not current:  Multiple  Early refill requested.    Last Written Prescription Date:  1/21/21  Last Fill Quantity: 90,  # refills:  1  Last office visit provider:  12/8/21     Last Written Prescription Date:  1/21/21  Last Fill Quantity: 90,  # refills: 2   Last office visit provider:  12/8/21    Last Written Prescription Date:  1/21/21  Last Fill Quantity: 90,  # refills: 3   Last office visit provider:  12/8/21    Requested Prescriptions   Pending Prescriptions Disp Refills     levothyroxine (SYNTHROID/LEVOTHROID) 150 MCG tablet 90 tablet 1     Sig: Take 1 tablet (150 mcg) by mouth daily       Thyroid Protocol Failed - 12/10/2021  7:24 AM        Failed - Normal TSH on file in past 12 months     Recent Labs   Lab Test 12/07/20  1318   TSH 0.35              Passed - Patient is 12 years or older        Passed - Recent (12 mo) or future (30 days) visit within the authorizing provider's specialty     Patient has had an office visit with the authorizing provider or a provider within the authorizing providers department within the previous 12 mos or has a future within next 30 days. See \"Patient Info\" tab in inbasket, or \"Choose Columns\" in Meds & Orders section of the refill encounter.              Passed - Medication is active on med list        Passed - No active pregnancy on record     If patient is pregnant or has had a positive pregnancy test, please check TSH.          Passed - No positive pregnancy test in past 12 months     If patient is pregnant or has had a positive pregnancy test, please check TSH.             amLODIPine (NORVASC) 10 MG tablet 90 tablet 2     Sig: Take 1 tablet (10 mg) by mouth daily       Calcium Channel Blockers Protocol  Failed - 12/10/2021  7:24 AM        Failed - Normal serum creatinine on file in past 12 months     Recent Labs   Lab Test 12/07/20  1318   CR 0.68       Ok to refill medication if creatinine is low         " " Passed - Blood pressure under 140/90 in past 12 months     BP Readings from Last 3 Encounters:   01/21/21 130/80   06/05/20 128/62   06/03/20 (!) 157/74                 Passed - Recent (12 mo) or future (30 days) visit within the authorizing provider's specialty     Patient has had an office visit with the authorizing provider or a provider within the authorizing providers department within the previous 12 mos or has a future within next 30 days. See \"Patient Info\" tab in inbasket, or \"Choose Columns\" in Meds & Orders section of the refill encounter.              Passed - Medication is active on med list        Passed - Patient is age 18 or older        Passed - No active pregnancy on record        Passed - No positive pregnancy test in past 12 months           gemfibrozil (LOPID) 600 MG tablet 180 tablet 3     Sig: Take 1 tablet (600 mg) by mouth 2 times daily       Fibrates Failed - 12/10/2021  7:24 AM        Failed - Lipid panel on file in past 12 months     Recent Labs   Lab Test 12/07/20  1318   CHOL 157   TRIG 179*   HDL 42*   LDL 79               Passed - No abnormal creatine kinase in past 12 months     No lab results found.             Passed - Recent (12 mo) or future (30 days) visit within the authorizing provider's specialty     Patient has had an office visit with the authorizing provider or a provider within the authorizing providers department within the previous 12 mos or has a future within next 30 days. See \"Patient Info\" tab in [facility], or \"Choose Columns\" in Meds & Orders section of the refill encounter.              Passed - Medication is active on med list        Passed - Patient is age 18 or older        Passed - No active pregnancy on record        Passed - No positive pregnancy test in past 12 months             Kash Sotelo RN 12/12/21 10:56 AM  "

## 2021-12-13 RX ORDER — GEMFIBROZIL 600 MG/1
600 TABLET, FILM COATED ORAL 2 TIMES DAILY
Qty: 180 TABLET | Refills: 3 | Status: SHIPPED | OUTPATIENT
Start: 2021-12-13 | End: 2022-05-02

## 2021-12-13 RX ORDER — LEVOTHYROXINE SODIUM 150 UG/1
150 TABLET ORAL DAILY
Qty: 90 TABLET | Refills: 1 | Status: SHIPPED | OUTPATIENT
Start: 2021-12-13 | End: 2022-06-06

## 2021-12-13 RX ORDER — AMLODIPINE BESYLATE 10 MG/1
10 TABLET ORAL DAILY
Qty: 90 TABLET | Refills: 2 | Status: SHIPPED | OUTPATIENT
Start: 2021-12-13 | End: 2022-07-27

## 2021-12-29 ENCOUNTER — TELEPHONE (OUTPATIENT)
Dept: FAMILY MEDICINE | Facility: CLINIC | Age: 71
End: 2021-12-29
Payer: COMMERCIAL

## 2021-12-29 DIAGNOSIS — E78.5 DYSLIPIDEMIA: ICD-10-CM

## 2022-01-30 ENCOUNTER — HEALTH MAINTENANCE LETTER (OUTPATIENT)
Age: 72
End: 2022-01-30

## 2022-02-04 DIAGNOSIS — I10 ESSENTIAL HYPERTENSION: ICD-10-CM

## 2022-02-04 NOTE — TELEPHONE ENCOUNTER
Reason for Call:  Medication or medication refill: Refill    Do you use a [facility]?  Name of the pharmacy and phone number for the current request: [facility]    Name of the medication requested: Metoprolol succinate     Other request: Doesn't want callback just wants med sent    Can we leave a detailed message on this number? YES    Phone number patient can be reached at: Home number on file [phone] (home)    Best Time: Open    Call taken on 2/4/2022 at 4:17 PM by Nataliia Wahl

## 2022-02-07 NOTE — TELEPHONE ENCOUNTER
Last fill 1/21/21    Last seen 12/8/21    Return in about 4 weeks (around 1/5/2022).       BP Readings from Last 3 Encounters:   01/21/21 130/80   06/05/20 128/62   06/03/20 (!) 157/74     No upcoming appointments

## 2022-02-08 ENCOUNTER — MYC MEDICAL ADVICE (OUTPATIENT)
Dept: FAMILY MEDICINE | Facility: CLINIC | Age: 72
End: 2022-02-08
Payer: COMMERCIAL

## 2022-02-08 DIAGNOSIS — M81.0 AGE-RELATED OSTEOPOROSIS WITHOUT CURRENT PATHOLOGICAL FRACTURE: Primary | ICD-10-CM

## 2022-02-08 RX ORDER — METOPROLOL SUCCINATE 50 MG/1
75 TABLET, EXTENDED RELEASE ORAL DAILY
Qty: 135 TABLET | Refills: 1 | Status: SHIPPED | OUTPATIENT
Start: 2022-02-08 | End: 2022-07-27

## 2022-02-08 NOTE — LETTER
April 25, 2022      Glenna Estrada  [address] [place] [address]        Dear ,    We are writing to inform you of your test results.    1. Osteoporosis, and at increased risk for fracture.   Please assist in scheduling a follow up visit ( Virtual Visit ) to further discuss and treat.     Please call us at [phone] and we will be happy to assist you in scheduling an appointment.     Resulted Orders   DX Hip/Pelvis/Spine    Narrative    EXAM DATE:         04/22/2022    EXAM: BONE MINERAL DENSITY (DEXA) EXAM  LOCATION: [facility]  DATE/TIME: 4/22/2022 1:30 PM    INDICATION: Osteoporosis  COMPARISON: Most recent examination 01/21/2020.    REGION:  LUMBAR SPINE L1-L4:  BMD: 1.143 g/cm sq  T Score: -0.3  Z Score: 1.4  Normal bone mineral density. . Bone mineral density in the lumbar spine has increased 5.2% since the most recent exam.    TOTAL RIGHT PROXIMAL FEMUR:  BMD: 0.761 g/cm sq  T Score: -2.0  Z Score: -0.5  Osteopenia. Bone mineral density in the total right proximal femur has increased 3.7% since the most recent exam.    RIGHT FEMORAL NECK:  BMD: 0.834 g/cm sq  T Score: -1.5  Z Score: 0.2  Osteopenia.    33% LEFT RADIUS:  BMD: 0.410 g/cm sq  T Score: -4.2  Z Score: -2.3  Osteoporosis. Bone mineral density in the left radius has decreased 3.8% since the most recent exam.    IMPRESSION:  1.  Osteoporosis. This patient is at increased risk for fracture.  2.  FRAX 10 year probability of major osteoporotic fracture is 8.6% and hip fracture is 1.1% using population [place] .    FRAX is a trademark of [employer], a [employer] Collaborating Center.                 If you have any questions or concerns, please call the clinic at the number listed above.       Sincerely,      Jacqui Molina MD

## 2022-02-08 NOTE — TELEPHONE ENCOUNTER
Pt msg from Funji:  The request has .   Could you please renew and then I will schedule the bone density as well as my labs for the same day.  I will also set up an appt with you about a week later.  Thank you      Order is fidel'd up for verification and approval/denial    Memo Irizarry Jr., CMA on 2022 at 12:14 PM

## 2022-03-27 ENCOUNTER — HEALTH MAINTENANCE LETTER (OUTPATIENT)
Age: 72
End: 2022-03-27

## 2022-04-06 ENCOUNTER — TRANSFERRED RECORDS (OUTPATIENT)
Dept: HEALTH INFORMATION MANAGEMENT | Facility: CLINIC | Age: 72
End: 2022-04-06
Payer: COMMERCIAL

## 2022-04-06 LAB — RETINOPATHY: NEGATIVE

## 2022-04-25 ENCOUNTER — TELEPHONE (OUTPATIENT)
Dept: FAMILY MEDICINE | Facility: CLINIC | Age: 72
End: 2022-04-25
Payer: COMMERCIAL

## 2022-04-25 NOTE — TELEPHONE ENCOUNTER
----- Message from Yamileth Laguerre MD sent at 4/25/2022  9:22 AM CDT -----  Pls call patient :   1. Osteoporosis, and at increased risk for fracture.  Please assist in scheduling a follow up visit ( Virtual Visit ) to further discuss and treat.

## 2022-04-29 DIAGNOSIS — E78.5 DYSLIPIDEMIA: ICD-10-CM

## 2022-04-29 DIAGNOSIS — N32.81 OAB (OVERACTIVE BLADDER): Primary | ICD-10-CM

## 2022-04-29 DIAGNOSIS — I10 ESSENTIAL HYPERTENSION: ICD-10-CM

## 2022-04-29 NOTE — TELEPHONE ENCOUNTER
Patient calling to request refill of the attached teed up medication.  Was sent in 12/13/21 to [facility], but patient was never notified and never picked it up from [facility].    Also requesting Oxybutynin 5 mg tablet.  Writer unable to fidel up for unknown reasons.  Computer giving error message when attempting.

## 2022-05-02 RX ORDER — OXYBUTYNIN CHLORIDE 5 MG/1
5 TABLET ORAL 2 TIMES DAILY
Qty: 180 TABLET | Refills: 0 | Status: SHIPPED | OUTPATIENT
Start: 2022-05-02 | End: 2022-05-18

## 2022-05-02 RX ORDER — LISINOPRIL 20 MG/1
20 TABLET ORAL 2 TIMES DAILY
Qty: 180 TABLET | Refills: 0 | Status: SHIPPED | OUTPATIENT
Start: 2022-05-02 | End: 2022-07-27

## 2022-05-02 RX ORDER — GEMFIBROZIL 600 MG/1
600 TABLET, FILM COATED ORAL 2 TIMES DAILY
Qty: 180 TABLET | Refills: 0 | Status: SHIPPED | OUTPATIENT
Start: 2022-05-02 | End: 2022-05-18

## 2022-05-02 NOTE — TELEPHONE ENCOUNTER
Last fill 12/13/21 for lisinopril and gemfibrozil    Last fill for oxybutynin 1/21/21    Last seen  12/8/21    BP Readings from Last 3 Encounters:   01/21/21 130/80   06/05/20 128/62   06/03/20 (!) 157/74           Next appointment is 5/10/22

## 2022-05-10 ENCOUNTER — LAB (OUTPATIENT)
Dept: LAB | Facility: CLINIC | Age: 72
End: 2022-05-10
Payer: COMMERCIAL

## 2022-05-10 DIAGNOSIS — E11.9 TYPE 2 DIABETES MELLITUS WITHOUT COMPLICATION, WITHOUT LONG-TERM CURRENT USE OF INSULIN (H): ICD-10-CM

## 2022-05-10 LAB
ANION GAP SERPL CALCULATED.3IONS-SCNC: 15 MMOL/L (ref 5–18)
BUN SERPL-MCNC: 17 MG/DL (ref 8–28)
CALCIUM SERPL-MCNC: 9.5 MG/DL (ref 8.5–10.5)
CHLORIDE BLD-SCNC: 105 MMOL/L (ref 98–107)
CHOLEST SERPL-MCNC: 169 MG/DL
CO2 SERPL-SCNC: 22 MMOL/L (ref 22–31)
CREAT SERPL-MCNC: 0.7 MG/DL (ref 0.6–1.1)
CREAT UR-MCNC: 58 MG/DL
FASTING STATUS PATIENT QL REPORTED: YES
GFR SERPL CREATININE-BSD FRML MDRD: >90 ML/MIN/1.73M2
GLUCOSE BLD-MCNC: 101 MG/DL (ref 70–125)
HBA1C MFR BLD: 5.6 % (ref 0–5.6)
HDLC SERPL-MCNC: 42 MG/DL
LDLC SERPL CALC-MCNC: 104 MG/DL
MICROALBUMIN UR-MCNC: 1.73 MG/DL (ref 0–1.99)
MICROALBUMIN/CREAT UR: 29.8 MG/G CR
POTASSIUM BLD-SCNC: 4.5 MMOL/L (ref 3.5–5)
SODIUM SERPL-SCNC: 142 MMOL/L (ref 136–145)
TRIGL SERPL-MCNC: 117 MG/DL

## 2022-05-10 PROCEDURE — 80061 LIPID PANEL: CPT

## 2022-05-10 PROCEDURE — 82043 UR ALBUMIN QUANTITATIVE: CPT

## 2022-05-10 PROCEDURE — 83036 HEMOGLOBIN GLYCOSYLATED A1C: CPT

## 2022-05-10 PROCEDURE — 36415 COLL VENOUS BLD VENIPUNCTURE: CPT

## 2022-05-10 PROCEDURE — 80048 BASIC METABOLIC PNL TOTAL CA: CPT

## 2022-05-16 NOTE — PROGRESS NOTES
Glenna is a 71 year old who is being evaluated via a billable telephone visit.      What phone number would you like to be contacted at? [phone]  How would you like to obtain your AVS? MyChart    Assessment & Plan       ICD-10-CM    1. Age-related osteoporosis without current pathological fracture  M81.0    2. High risk for fracture due to osteoporosis by DEXA scan  M81.0      Most recent bone scan consistent with osteoporosis with high risk fracture.  This is not a new finding but T-scores have somewhat progressed/worsened over time.  Patient is on calcium and vitamin D.  Has history of hip replacement.  No recent falls or history of fractures.  Does not recall being on bisphosphonates in the past or infusions like Prolia/Reclast.  Is interested in starting therapy.    Reviewed her options including oral therapy with the bisphosphonates and the infusions.  Reviewed side effect profile and expected duration of therapies.  Patient would like to discuss these findings and her options with the daughters and get back to me on whether or not she wants to start medication.    25 minutes spent on the date of the encounter doing chart review, history and exam, documentation and further activities per the note    No follow-ups on file.    Margarita Tucker DO  [facility]   Glenna is a 71 year old who presents for the following health issues    We are meeting today to discuss the results of most recent bone scan     Review of Systems   As per HPI        Objective         Vitals:  No vitals were obtained today due to virtual visit.    Physical Exam   healthy, alert and no distress  PSYCH: Alert and oriented times 3; coherent speech, normal   rate and volume, able to articulate logical thoughts, able   to abstract reason, no tangential thoughts, no hallucinations   or delusions  Her affect is normal  RESP: No cough, no audible wheezing, able to talk in full sentences  Remainder of exam  unable to be completed due to telephone visits    Phone call duration: 15 minutes

## 2022-05-17 ENCOUNTER — VIRTUAL VISIT (OUTPATIENT)
Dept: FAMILY MEDICINE | Facility: CLINIC | Age: 72
End: 2022-05-17
Payer: COMMERCIAL

## 2022-05-17 DIAGNOSIS — M81.0 HIGH RISK FOR FRACTURE DUE TO OSTEOPOROSIS BY DEXA SCAN: ICD-10-CM

## 2022-05-17 DIAGNOSIS — M81.0 AGE-RELATED OSTEOPOROSIS WITHOUT CURRENT PATHOLOGICAL FRACTURE: Primary | ICD-10-CM

## 2022-05-17 PROCEDURE — 99213 OFFICE O/P EST LOW 20 MIN: CPT | Mod: 95 | Performed by: STUDENT IN AN ORGANIZED HEALTH CARE EDUCATION/TRAINING PROGRAM

## 2022-05-17 NOTE — TELEPHONE ENCOUNTER
Patient calling to request 2 of the 3 below medications be resent to [facility] instead of [facility].  Patient was given incorrect information and they will cost more at [facility] than [facility].  The 2 medications she would like resent to [facility] are:    Gemfibrozil  Oxybutynin    Please call patient when this has been completed per her request so she can be watching for them to show up in the mail.  Patient can be reached at [phone].

## 2022-05-18 RX ORDER — OXYBUTYNIN CHLORIDE 5 MG/1
5 TABLET ORAL 2 TIMES DAILY
Qty: 180 TABLET | Refills: 0 | Status: SHIPPED | OUTPATIENT
Start: 2022-05-18 | End: 2022-07-19

## 2022-05-18 RX ORDER — GEMFIBROZIL 600 MG/1
600 TABLET, FILM COATED ORAL 2 TIMES DAILY
Qty: 180 TABLET | Refills: 0 | Status: SHIPPED | OUTPATIENT
Start: 2022-05-18 | End: 2022-07-19

## 2022-06-02 DIAGNOSIS — E03.9 HYPOTHYROIDISM, UNSPECIFIED TYPE: ICD-10-CM

## 2022-06-04 NOTE — TELEPHONE ENCOUNTER
"Routing refill request to provider for review/approval because:  Labs not current:      Last Written Prescription Date:  12/13/21  Last Fill Quantity: 90,  # refills: 1   Last office visit provider:  5/17/22     Requested Prescriptions   Pending Prescriptions Disp Refills     levothyroxine (SYNTHROID/LEVOTHROID) 150 MCG tablet 90 tablet 1     Sig: Take 1 tablet (150 mcg) by mouth daily       Thyroid Protocol Failed - 6/2/2022  2:53 PM        Failed - Normal TSH on file in past 12 months     Recent Labs   Lab Test 12/07/20  1318   TSH 0.35              Passed - Patient is 12 years or older        Passed - Recent (12 mo) or future (30 days) visit within the authorizing provider's specialty     Patient has had an office visit with the authorizing provider or a provider within the authorizing providers department within the previous 12 mos or has a future within next 30 days. See \"Patient Info\" tab in inbasket, or \"Choose Columns\" in Meds & Orders section of the refill encounter.              Passed - Medication is active on med list        Passed - No active pregnancy on record     If patient is pregnant or has had a positive pregnancy test, please check TSH.          Passed - No positive pregnancy test in past 12 months     If patient is pregnant or has had a positive pregnancy test, please check TSH.               Chanda Armas RN 06/04/22 11:44 AM  "

## 2022-06-06 RX ORDER — LEVOTHYROXINE SODIUM 150 UG/1
150 TABLET ORAL DAILY
Qty: 90 TABLET | Refills: 0 | Status: SHIPPED | OUTPATIENT
Start: 2022-06-06 | End: 2022-07-27

## 2022-07-17 DIAGNOSIS — E78.5 DYSLIPIDEMIA: ICD-10-CM

## 2022-07-17 DIAGNOSIS — N32.81 OAB (OVERACTIVE BLADDER): ICD-10-CM

## 2022-07-19 RX ORDER — GEMFIBROZIL 600 MG/1
600 TABLET, FILM COATED ORAL 2 TIMES DAILY
Qty: 180 TABLET | Refills: 3 | Status: SHIPPED | OUTPATIENT
Start: 2022-07-19 | End: 2022-07-27

## 2022-07-19 RX ORDER — OXYBUTYNIN CHLORIDE 5 MG/1
5 TABLET ORAL 2 TIMES DAILY
Qty: 180 TABLET | Refills: 3 | Status: SHIPPED | OUTPATIENT
Start: 2022-07-19 | End: 2022-07-27

## 2022-07-19 NOTE — TELEPHONE ENCOUNTER
"Last Written Prescription Date:  5/18/22  Last Fill Quantity: 180,  # refills: 0   Last office visit provider:  5/17/22     Requested Prescriptions   Pending Prescriptions Disp Refills     gemfibrozil (LOPID) 600 MG tablet 180 tablet 0     Sig: Take 1 tablet (600 mg) by mouth 2 times daily       Fibrates Passed - 7/19/2022  8:55 AM        Passed - Lipid panel on file in past 12 months     Recent Labs   Lab Test 05/10/22  1041   CHOL 169   TRIG 117   HDL 42*                  Passed - No abnormal creatine kinase in past 12 months     No lab results found.             Passed - Recent (12 mo) or future (30 days) visit within the authorizing provider's specialty     Patient has had an office visit with the authorizing provider or a provider within the authorizing providers department within the previous 12 mos or has a future within next 30 days. See \"Patient Info\" tab in inbasket, or \"Choose Columns\" in Meds & Orders section of the refill encounter.              Passed - Medication is active on med list        Passed - Patient is age 18 or older        Passed - No active pregnancy on record        Passed - No positive pregnancy test in past 12 months           oxybutynin (DITROPAN) 5 MG tablet 180 tablet 0     Sig: Take 1 tablet (5 mg) by mouth 2 times daily       Muscarinic Antagonists (Urinary Incontinence Agents) Passed - 7/19/2022  8:55 AM        Passed - Recent (12 mo) or future (30 days) visit within the authorizing provider's specialty     Patient has had an office visit with the authorizing provider or a provider within the authorizing providers department within the previous 12 mos or has a future within next 30 days. See \"Patient Info\" tab in inbasket, or \"Choose Columns\" in Meds & Orders section of the refill encounter.              Passed - Medication is Oxybutynin and patient is 5 years of age or older        Passed - Patient does not have a diagnosis of glaucoma on the problem list     If glaucoma " diagnosis is new, refer refill to physician.          Passed - Medication is active on med list        Passed - Patient is 18 years of age or older             Kash Sotelo RN 07/19/22 8:55 AM

## 2022-07-25 NOTE — PROGRESS NOTES
"  SUBJECTIVE:   Glenna Estrada is a 71 year old female who presents for Preventive Visit.      Patient has been advised of split billing requirements and indicates understanding: Yes  Are you in the first 12 months of your Medicare coverage?  No    Healthy Habits:     Do you usually eat at least 4 servings of fruit and vegetables a day, include whole grains    & fiber and avoid regularly eating high fat or \"junk\" foods?  No    Taking medications regularly:  Yes    Medication side effects:  None    Ability to successfully perform activities of daily living:  No assistance needed    Home Safety:  Throw rugs in the hallway    Hearing Impairment:  Difficulty following a conversation in a noisy restaurant or crowded room and difficulty understanding soft or whispered speech    In the past 6 months, have you been bothered by leaking of urine? Yes    In general, how would you rate your overall mental or emotional health?  Fair      PHQ-2 Total Score: 2    Additional concerns today:  Yes      --approx 1 week ago, states her leg 'kicked out' while walking down stairs--has been icing and heating it and thinks its getting better but would like it looked at.  Did hot and cold treatment initially.  Pain in the back of the calf.      Hearing loss at time.  tinnitius and feels like steam pipe with crack in it sounding.  Wanting to wait and see         Do you feel safe in your environment? Yes    Have you ever done Advance Care Planning? (For example, a Health Directive, POLST, or a discussion with a medical provider or your loved ones about your wishes): No, advance care planning information given to patient to review.  Patient plans to discuss their wishes with loved ones or provider.        Fall risk  Fallen 2 or more times in the past year?: No  Any fall with injury in the past year?: No  click delete button to remove this line now    Cognitive Screening   1) Repeat 3 items (Leader, Season, Table)    2) Clock draw: NORMAL  3) 3 " item recall: Recalls 3 objects  Results: 3 items recalled: COGNITIVE IMPAIRMENT LESS LIKELY    Mini-CogTM Copyright S Lucas. Licensed by the author for use in [facility]; reprinted with permission (maikel@.[facility]). All rights reserved.      Do you have sleep apnea, excessive snoring or daytime drowsiness?: no    Reviewed and updated as needed this visit by clinical staff   Tobacco  Allergies  Meds                Reviewed and updated as needed this visit by Provider                   Social History     Tobacco Use     Smoking status: Former Smoker     Packs/day: 1.50     Years: 20.00     Pack years: 30.00     Types: Cigarettes, Cigarettes     Quit date: 1992     Years since quittin.7     Smokeless tobacco: Never Used   Substance Use Topics     Alcohol use: Yes     Comment: Alcoholic Drinks/day: rarely     If you drink alcohol do you typically have >3 drinks per day or >7 drinks per week? No    Alcohol Use 2022   Prescreen: >3 drinks/day or >7 drinks/week? No   Prescreen: >3 drinks/day or >7 drinks/week? -               Current providers sharing in care for this patient include:   Patient Care Team:  Jacqui Molina MD as PCP - General  Jacqui Molina MD as Assigned PCP    The following health maintenance items are reviewed in Epic and correct as of today:  Health Maintenance Due   Topic Date Due     ZOSTER IMMUNIZATION (1 of 2) Never done     DTAP/TDAP/TD IMMUNIZATION (1 - Tdap) 2008     COLORECTAL CANCER SCREENING  2021     Pneumococcal Vaccine: 65+ Years (2 - PCV) 2021     Lab work is in process          Review of Systems   Constitutional: Negative for chills and fever.   HENT: Positive for hearing loss. Negative for congestion, ear pain and sore throat.    Eyes: Negative for pain and visual disturbance.   Respiratory: Negative for cough and shortness of breath.    Cardiovascular: Positive for peripheral edema. Negative for chest pain and palpitations.  "  Gastrointestinal: Negative for abdominal pain, constipation, diarrhea, heartburn, hematochezia and nausea.   Breasts:  Negative for tenderness, breast mass and discharge.   Genitourinary: Positive for frequency and urgency. Negative for dysuria, genital sores, hematuria, pelvic pain, vaginal bleeding and vaginal discharge.   Musculoskeletal: Positive for arthralgias, joint swelling and myalgias.   Skin: Negative for rash.   Neurological: Positive for headaches. Negative for dizziness, weakness and paresthesias.   Psychiatric/Behavioral: Negative for mood changes. The patient is not nervous/anxious.          OBJECTIVE:   BP (!) 160/70   Pulse 69   Temp 98.8  F (37.1  C) (Oral)   Ht 1.657 m (5' 5.25\")   Wt 121.5 kg (267 lb 12.8 oz)   SpO2 98%   BMI 44.22 kg/m   Estimated body mass index is 44.22 kg/m  as calculated from the following:    Height as of this encounter: 1.657 m (5' 5.25\").    Weight as of this encounter: 121.5 kg (267 lb 12.8 oz).  Physical Exam  GENERAL: healthy, alert and no distress  NECK: no adenopathy, no asymmetry, masses, or scars and thyroid normal to palpation  RESP: lungs clear to auscultation - no rales, rhonchi or wheezes  CV: regular rate and rhythm, normal S1 S2, no S3 or S4, no murmur, click or rub, no peripheral edema and peripheral pulses strong  ABDOMEN: soft, nontender, no hepatosplenomegaly, no masses and bowel sounds normal  MS: no gross musculoskeletal defects noted, no edema    Diagnostic Test Results:  Labs reviewed in Epic    ASSESSMENT / PLAN:       ICD-10-CM    1. Encounter for Medicare annual wellness exam  Z00.00 CBC with platelets and differential     Comprehensive metabolic panel (BMP + Alb, Alk Phos, ALT, AST, Total. Bili, TP)   2. Screen for colon cancer  Z12.11    3. Essential hypertension  I10 amLODIPine (NORVASC) 10 MG tablet     lisinopril (ZESTRIL) 20 MG tablet     metoprolol succinate ER (TOPROL XL) 50 MG 24 hr tablet  Elevated today and didn't take " "medication - will take and check bp at home   4. Hypothyroidism, unspecified type  E03.9 levothyroxine (SYNTHROID/LEVOTHROID) 150 MCG tablet   5. Dyslipidemia  E78.5 gemfibrozil (LOPID) 600 MG tablet     Lipid panel reflex to direct LDL Fasting   6. OAB (overactive bladder)  N32.81 oxybutynin (DITROPAN) 5 MG tablet  Stable and will continue with current medications   7. Class 3 severe obesity due to excess calories with serious comorbidity in adult, unspecified BMI (H)  E66.01 Recommend diet and exercise.   8. Pain of right lower extremity  M79.604 US Lower Extremity Venous Duplex Right  Will do US but low suspicion    9. Lymphedema  I89.0 Stable seeing vascular surgery   10. History of DVT (deep vein thrombosis)  Z86.718        Patient has been advised of split billing requirements and indicates understanding: Yes    COUNSELING:  Reviewed preventive health counseling, as reflected in patient instructions       Regular exercise       Healthy diet/nutrition    Estimated body mass index is 44.22 kg/m  as calculated from the following:    Height as of this encounter: 1.657 m (5' 5.25\").    Weight as of this encounter: 121.5 kg (267 lb 12.8 oz).        She reports that she quit smoking about 29 years ago. Her smoking use included cigarettes and cigarettes. She has a 30.00 pack-year smoking history. She has never used smokeless tobacco.      Appropriate preventive services were discussed with this patient, including applicable screening as appropriate for cardiovascular disease, diabetes, osteopenia/osteoporosis, and glaucoma.  As appropriate for age/gender, discussed screening for colorectal cancer, prostate cancer, breast cancer, and cervical cancer. Checklist reviewing preventive services available has been given to the patient.    Reviewed patients plan of care and provided an AVS. The Basic Care Plan (routine screening as documented in Health Maintenance) for Glenna meets the Care Plan requirement. This Care Plan has " been established and reviewed with the Patient.    Counseling Resources:  ATP IV Guidelines  Pooled Cohorts Equation Calculator  Breast Cancer Risk Calculator  Breast Cancer: Medication to Reduce Risk  FRAX Risk Assessment  ICSI Preventive Guidelines  Dietary Guidelines for Americans, 2010  USDA's MyPlate  ASA Prophylaxis  Lung CA Screening    Jacqui Molina MD  [facility]    Identified Health Risks:    The patient was counseled and encouraged to consider modifying their diet and eating habits. She was provided with information on recommended healthy diet options.  The patient was provided with written information regarding signs of hearing loss.  Information on urinary incontinence and treatment options given to patient.  The patient was provided with suggestions to help her develop a healthy emotional lifestyle.

## 2022-07-27 ENCOUNTER — OFFICE VISIT (OUTPATIENT)
Dept: FAMILY MEDICINE | Facility: CLINIC | Age: 72
End: 2022-07-27
Payer: COMMERCIAL

## 2022-07-27 VITALS
SYSTOLIC BLOOD PRESSURE: 160 MMHG | WEIGHT: 267.8 LBS | HEART RATE: 69 BPM | HEIGHT: 65 IN | OXYGEN SATURATION: 98 % | BODY MASS INDEX: 44.62 KG/M2 | TEMPERATURE: 98.8 F | DIASTOLIC BLOOD PRESSURE: 70 MMHG

## 2022-07-27 DIAGNOSIS — Z12.11 SCREEN FOR COLON CANCER: ICD-10-CM

## 2022-07-27 DIAGNOSIS — N32.81 OAB (OVERACTIVE BLADDER): ICD-10-CM

## 2022-07-27 DIAGNOSIS — I10 ESSENTIAL HYPERTENSION: ICD-10-CM

## 2022-07-27 DIAGNOSIS — I89.0 LYMPHEDEMA: ICD-10-CM

## 2022-07-27 DIAGNOSIS — E66.01 CLASS 3 SEVERE OBESITY DUE TO EXCESS CALORIES WITH SERIOUS COMORBIDITY IN ADULT, UNSPECIFIED BMI (H): ICD-10-CM

## 2022-07-27 DIAGNOSIS — E78.5 DYSLIPIDEMIA: ICD-10-CM

## 2022-07-27 DIAGNOSIS — E66.813 CLASS 3 SEVERE OBESITY DUE TO EXCESS CALORIES WITH SERIOUS COMORBIDITY IN ADULT, UNSPECIFIED BMI (H): ICD-10-CM

## 2022-07-27 DIAGNOSIS — E03.9 HYPOTHYROIDISM, UNSPECIFIED TYPE: ICD-10-CM

## 2022-07-27 DIAGNOSIS — Z86.718 HISTORY OF DVT (DEEP VEIN THROMBOSIS): ICD-10-CM

## 2022-07-27 DIAGNOSIS — M79.604 PAIN OF RIGHT LOWER EXTREMITY: ICD-10-CM

## 2022-07-27 DIAGNOSIS — Z00.00 ENCOUNTER FOR MEDICARE ANNUAL WELLNESS EXAM: Primary | ICD-10-CM

## 2022-07-27 PROCEDURE — 99214 OFFICE O/P EST MOD 30 MIN: CPT | Mod: 25 | Performed by: FAMILY MEDICINE

## 2022-07-27 PROCEDURE — G0438 PPPS, INITIAL VISIT: HCPCS | Performed by: FAMILY MEDICINE

## 2022-07-27 RX ORDER — OXYBUTYNIN CHLORIDE 5 MG/1
5 TABLET ORAL 2 TIMES DAILY
Qty: 180 TABLET | Refills: 3 | Status: SHIPPED | OUTPATIENT
Start: 2022-07-27 | End: 2023-05-13

## 2022-07-27 RX ORDER — METOPROLOL SUCCINATE 50 MG/1
75 TABLET, EXTENDED RELEASE ORAL DAILY
Qty: 135 TABLET | Refills: 3 | Status: SHIPPED | OUTPATIENT
Start: 2022-07-27 | End: 2023-10-04

## 2022-07-27 RX ORDER — ANTIARTHRITIC COMBINATION NO.2 900 MG
5000 TABLET ORAL DAILY
COMMUNITY

## 2022-07-27 RX ORDER — LISINOPRIL 20 MG/1
20 TABLET ORAL 2 TIMES DAILY
Qty: 180 TABLET | Refills: 3 | Status: SHIPPED | OUTPATIENT
Start: 2022-07-27 | End: 2022-10-13

## 2022-07-27 RX ORDER — GEMFIBROZIL 600 MG/1
600 TABLET, FILM COATED ORAL 2 TIMES DAILY
Qty: 180 TABLET | Refills: 3 | Status: SHIPPED | OUTPATIENT
Start: 2022-07-27 | End: 2023-05-13

## 2022-07-27 RX ORDER — LEVOTHYROXINE SODIUM 150 UG/1
150 TABLET ORAL DAILY
Qty: 90 TABLET | Refills: 3 | Status: SHIPPED | OUTPATIENT
Start: 2022-07-27 | End: 2023-09-08

## 2022-07-27 RX ORDER — AMLODIPINE BESYLATE 10 MG/1
10 TABLET ORAL DAILY
Qty: 90 TABLET | Refills: 3 | Status: SHIPPED | OUTPATIENT
Start: 2022-07-27 | End: 2023-09-08

## 2022-07-27 ASSESSMENT — ENCOUNTER SYMPTOMS
MYALGIAS: 1
HEMATURIA: 0
NERVOUS/ANXIOUS: 0
DYSURIA: 0
WEAKNESS: 0
PALPITATIONS: 0
CONSTIPATION: 0
ARTHRALGIAS: 1
DIZZINESS: 0
SORE THROAT: 0
HEARTBURN: 0
NAUSEA: 0
HEMATOCHEZIA: 0
ABDOMINAL PAIN: 0
PARESTHESIAS: 0
DIARRHEA: 0
SHORTNESS OF BREATH: 0
FEVER: 0
EYE PAIN: 0
COUGH: 0
CHILLS: 0
HEADACHES: 1
BREAST MASS: 0
JOINT SWELLING: 1
FREQUENCY: 1

## 2022-07-27 ASSESSMENT — ACTIVITIES OF DAILY LIVING (ADL): CURRENT_FUNCTION: NO ASSISTANCE NEEDED

## 2022-07-27 NOTE — PATIENT INSTRUCTIONS
Patient Education   Personalized Prevention Plan  You are due for the preventive services outlined below.  Your care team is available to assist you in scheduling these services.  If you have already completed any of these items, please share that information with your care team to update in your medical record.  Health Maintenance Due   Topic Date Due     Zoster (Shingles) Vaccine (1 of 2) Never done     Diptheria Tetanus Pertussis (DTAP/TDAP/TD) Vaccine (1 - Tdap) 09/30/2008     Colorectal Cancer Screening  02/06/2021     Pneumococcal Vaccine (2 - PCV) 12/07/2021       Understanding USDA MyPlate  The USDA has guidelines to help you make healthy food choices. These are called MyPlate. MyPlate shows the food groups that make up healthy meals using the image of a place setting. Before you eat, think about the healthiest choices for what to put on your plate or in your cup or bowl. To learn more about building a healthy plate, visit www.choosemyplate.gov.    The food groups    Fruits. Any fruit or 100% fruit juice counts as part of the Fruit Group. Fruits may be fresh, canned, frozen, or dried, and may be whole, cut-up, or pureed. Make 1/2 of your plate fruits and vegetables.    Vegetables. Any vegetable or 100% vegetable juice counts as a member of the Vegetable Group. Vegetables may be fresh, frozen, canned, or dried. They can be served raw or cooked and may be whole, cut-up, or mashed. Make 1/2 of your plate fruits and vegetables.    Grains. All foods made from grains are part of the Grains Group. These include wheat, rice, oats, cornmeal, and barley. Grains are often used to make foods such as bread, pasta, oatmeal, cereal, tortillas, and grits. Grains should be no more than 1/4 of your plate. At least half of your grains should be whole grains.    Protein. This group includes meat, poultry, seafood, beans and peas, eggs, processed soy products (such as tofu), nuts (including nut butters), and seeds. Make  protein choices no more than 1/4 of your plate. Meat and poultry choices should be lean or low fat.    Dairy. The Dairy Group includes all fluid milk products and foods made from milk that contain calcium, such as yogurt and cheese. (Foods that have little calcium, such as cream, butter, and cream cheese, are not part of this group.) Most dairy choices should be low-fat or fat-free.    Oils. Oils aren't a food group, but they do contain essential nutrients. However it's important to watch your intake of oils. These are fats that are liquid at room temperature. They include canola, corn, olive, soybean, vegetable, and sunflower oil. Foods that are mainly oil include mayonnaise, certain salad dressings, and soft margarines. You likely already get your daily oil allowance from the foods you eat.  Things to limit  Eating healthy also means limiting these things in your diet:       Salt (sodium). Many processed foods have a lot of sodium. To keep sodium intake down, eat fresh vegetables, meats, poultry, and seafood when possible. Purchase low-sodium, reduced-sodium, or no-salt-added food products at the store. And don't add salt to your meals at home. Instead, season them with herbs and spices such as dill, oregano, cumin, and paprika. Or try adding flavor with lemon or lime zest and juice.    Saturated fat. Saturated fats are most often found in animal products such as beef, pork, and chicken. They are often solid at room temperature, such as butter. To reduce your saturated fat intake, choose leaner cuts of meat and poultry. And try healthier cooking methods such as grilling, broiling, roasting, or baking. For a simple lower-fat swap, use plain nonfat yogurt instead of mayonnaise when making potato salad or macaroni salad.    Added sugars. These are sugars added to foods. They are in foods such as ice cream, candy, soda, fruit drinks, sports drinks, energy drinks, cookies, pastries, jams, and syrups. Cut down on added  sugars by sharing sweet treats with a family member or friend. You can also choose fruit for dessert, and drink water or other unsweetened beverages.     [facility] last reviewed this educational content on 6/1/2020 2000-2021 [employer]. All rights reserved. This information is not intended as a substitute for professional medical care. Always follow your healthcare professional's instructions.          Signs of Hearing Loss      Hearing much better with one ear can be a sign of hearing loss.   Hearing loss is a problem shared by many people. In fact, it is one of the most common health problems, particularly as people age. Most people age 65 and older have some hearing loss. By age 80, almost everyone does. Hearing loss often occurs slowly over the years. So you may not realize your hearing has gotten worse.  Have your hearing checked  Call your healthcare provider if you:    Have to strain to hear normal conversation    Have to watch other people s faces very carefully to follow what they re saying    Need to ask people to repeat what they ve said    Often misunderstand what people are saying    Turn the volume of the television or radio up so high that others complain    Feel that people are mumbling when they re talking to you    Find that the effort to hear leaves you feeling tired and irritated    Notice, when using the phone, that you hear better with one ear than the other  [facility] last reviewed this educational content on 1/1/2020 2000-2021 [employer]. All rights reserved. This information is not intended as a substitute for professional medical care. Always follow your healthcare professional's instructions.          Urinary Incontinence, Female (Adult)   Urinary incontinence means loss of bladder control. This problem affects many women, especially as they get older. If you have incontinence, you may be embarrassed to ask for help. But know that this problem can be  treated.   Types of Incontinence  There are different types of incontinence. Two of the main types are described here. You can have more than one type.     Stress incontinence. With this type, urine leaks when pressure (stress) is put on the bladder. This may happen when you cough, sneeze, or laugh. Stress incontinence most often occurs because the pelvic floor muscles that support the bladder and urethra are weak. This can happen after pregnancy and vaginal childbirth or a hysterectomy. It can also be due to excess body weight or hormone changes.    Urge incontinence (also called overactive bladder). With this type, a sudden urge to urinate is felt often. This may happen even though there may not be much urine in the bladder. The need to urinate often during the night is common. Urge incontinence most often occurs because of bladder spasms. This may be due to bladder irritation or infection. Damage to bladder nerves or pelvic muscles, constipation, and certain medicines can also lead to urge incontinence.  Treatment depends on the cause. Further evaluation is needed to find the type you have. This will likely include an exam and certain tests. Based on the results, you and your healthcare provider can then plan treatment. Until a diagnosis is made, the home care tips below can help ease symptoms.   Home care    Do pelvic floor muscle exercises, if they are prescribed. The pelvic floor muscles help support the bladder and urethra. Many women find that their symptoms improve when doing special exercises that strengthen these muscles. To do the exercises, contract the muscles you would use to stop your stream of urine. But do this when you re not urinating. Hold for 10 seconds, then relax. Repeat 10 to 20 times in a row, at least 3 times a day. Your healthcare provider may give you other instructions for how to do the exercises and how often.    Keep a bladder diary. This helps track how often and how much you urinate  over a set period of time. Bring this diary with you to your next visit with the provider. The information can help your provider learn more about your bladder problem.    Lose weight, if advised to by your provider. Extra weight puts pressure on the bladder. Your provider can help you create a weight-loss plan that s right for you. This may include exercising more and making certain diet changes.    Don't have foods and drinks that may irritate the bladder. These can include alcohol and caffeinated drinks.    Quit smoking. Smoking and other tobacco use can lead to a long-term (chronic) cough that strains the pelvic floor muscles. Smoking may also damage the bladder and urethra. Talk with your provider about treatments or methods you can use to quit smoking.    If drinking large amounts of fluid makes you have symptoms, you may be advised to limit your fluid intake. You may also be advised to drink most of your fluids during the day and to limit fluids at night.    If you re worried about urine leakage or accidents, you may wear absorbent pads to catch urine. Change the pads often. This helps reduce discomfort. It may also reduce the risk of skin or bladder infections.    Follow-up care  Follow up with your healthcare provider, or as directed. It may take some to find the right treatment for your problem. But healthy lifestyle changes can be made right away. These include such things as exercising on a regular basis, eating a healthy diet, losing weight (if needed), and quitting smoking. Your treatment plan may include special therapies or medicines. Certain procedures or surgery may also be options. Talk about any questions you have with your provider.   When to seek medical advice  Call the healthcare provider right away if any of these occur:    Fever of 100.4 F (38 C) or higher, or as directed by your provider    Bladder pain or fullness    Belly swelling    Nausea or vomiting    Back pain    Weakness,  dizziness, or fainting  [employer] last reviewed this educational content on 1/1/2020 2000-2021 [employer]. All rights reserved. This information is not intended as a substitute for professional medical care. Always follow your healthcare professional's instructions.        Your Health Risk Assessment indicates you feel you are not in good emotional health.    Recreation   Recreation is not limited to sports and team events. It includes any activity that provides relaxation, interest, enjoyment, and exercise. Recreation provides an outlet for physical, mental, and social energy. It can give a sense of worth and achievement. It can help you stay healthy.    Mental Exercise and Social Involvement  Mental and emotional health is as important as physical health. Keep in touch with friends and family. Stay as active as possible. Continue to learn and challenge yourself.   Things you can do to stay mentally active are:    Learn something new, like a foreign language or musical instrument.     Play SCRABBLE or do crossword puzzles. If you cannot find people to play these games with you at home, you can play them with others on your computer through the Internet.     Join a games club--anything from card games to chess or checkers or lawn bowling.     Start a new hobby.     Go back to school.     Volunteer.     Read.   Keep up with world events.

## 2022-07-28 ENCOUNTER — HOSPITAL ENCOUNTER (OUTPATIENT)
Dept: ULTRASOUND IMAGING | Facility: HOSPITAL | Age: 72
Discharge: HOME | End: 2022-07-28
Attending: FAMILY MEDICINE | Admitting: FAMILY MEDICINE
Payer: COMMERCIAL

## 2022-07-28 DIAGNOSIS — M79.604 PAIN OF RIGHT LOWER EXTREMITY: ICD-10-CM

## 2022-07-28 PROCEDURE — 93971 EXTREMITY STUDY: CPT | Mod: RT

## 2022-08-10 PROBLEM — E78.5 DYSLIPIDEMIA: Status: ACTIVE | Noted: 2022-08-10

## 2022-09-24 ENCOUNTER — HEALTH MAINTENANCE LETTER (OUTPATIENT)
Age: 72
End: 2022-09-24

## 2022-10-13 ENCOUNTER — VIRTUAL VISIT (OUTPATIENT)
Dept: FAMILY MEDICINE | Facility: CLINIC | Age: 72
End: 2022-10-13
Payer: COMMERCIAL

## 2022-10-13 DIAGNOSIS — I10 ESSENTIAL HYPERTENSION: Primary | ICD-10-CM

## 2022-10-13 PROCEDURE — 99442 PR PHYSICIAN TELEPHONE EVALUATION 11-20 MIN: CPT | Performed by: FAMILY MEDICINE

## 2022-10-13 RX ORDER — LISINOPRIL AND HYDROCHLOROTHIAZIDE 20; 25 MG/1; MG/1
1 TABLET ORAL DAILY
Qty: 90 TABLET | Refills: 0 | Status: SHIPPED | OUTPATIENT
Start: 2022-10-13 | End: 2022-11-02

## 2022-10-13 NOTE — PROGRESS NOTES
Glenna is a 72 year old who is being evaluated via a billable telephone visit.      What phone number would you like to be contacted at? [phone]  How would you like to obtain your AVS? MyChart    Assessment & Plan     Essential hypertension  Will change to zestoretic and see if improvement.  Start with 1 tablet a day and then my chart message in 5 days with blood pressure readings - may need to go to 2 tablets a day.  Continue with the other medications.  Any worsening symptoms and go to the ER or call the office.  - lisinopril-hydrochlorothiazide (ZESTORETIC) 20-25 MG tablet  Dispense: 90 tablet; Refill: 0  - Basic metabolic panel  (Ca, Cl, CO2, Creat, Gluc, K, Na, BUN)                   Return if symptoms worsen or fail to improve, for Routine preventive.    Jacqui Molina MD  [facility]   Glenna is a 72 year old, presenting for the following health issues:  Hypertension (BP was too high at procedure. Colonoscopy was not completed due to this. )      HPI     Hypertension Follow-up      Do you check your blood pressure regularly outside of the clinic? No     Are you following a low salt diet? Yes    Are your blood pressures ever more than 140 on the top number (systolic) OR more   than 90 on the bottom number (diastolic), for example 140/90? Yes    Blood pressure up - did the prep for colonoscopy and over 200.  On amlodipine, metoprolol and lisinopril.  Occasional HA, no vision changes or chest pain.            Review of Systems   Constitutional, HEENT, cardiovascular, pulmonary, gi and gu systems are negative, except as otherwise noted.      Objective           Vitals:  No vitals were obtained today due to virtual visit.    Physical Exam   healthy, alert and no distress  PSYCH: Alert and oriented times 3; coherent speech, normal   rate and volume, able to articulate logical thoughts, able   to abstract reason, no tangential thoughts, no hallucinations   or delusions  Her  affect is normal  RESP: No cough, no audible wheezing, able to talk in full sentences  Remainder of exam unable to be completed due to telephone visits                Phone call duration: 10 minutes

## 2022-10-27 ENCOUNTER — LAB (OUTPATIENT)
Dept: LAB | Facility: CLINIC | Age: 72
End: 2022-10-27
Payer: COMMERCIAL

## 2022-10-27 DIAGNOSIS — E78.5 DYSLIPIDEMIA: ICD-10-CM

## 2022-10-27 DIAGNOSIS — I10 ESSENTIAL HYPERTENSION: ICD-10-CM

## 2022-10-27 DIAGNOSIS — Z00.00 ENCOUNTER FOR MEDICARE ANNUAL WELLNESS EXAM: ICD-10-CM

## 2022-10-27 LAB
ALBUMIN SERPL BCG-MCNC: 4.1 G/DL (ref 3.5–5.2)
ALP SERPL-CCNC: 124 U/L (ref 35–104)
ALT SERPL W P-5'-P-CCNC: 16 U/L (ref 10–35)
ANION GAP SERPL CALCULATED.3IONS-SCNC: 10 MMOL/L (ref 7–15)
AST SERPL W P-5'-P-CCNC: 21 U/L (ref 10–35)
BASOPHILS # BLD AUTO: 0 10E3/UL (ref 0–0.2)
BASOPHILS NFR BLD AUTO: 1 %
BILIRUB SERPL-MCNC: 0.3 MG/DL
BUN SERPL-MCNC: 21.6 MG/DL (ref 8–23)
CALCIUM SERPL-MCNC: 10 MG/DL (ref 8.8–10.2)
CHLORIDE SERPL-SCNC: 108 MMOL/L (ref 98–107)
CHOLEST SERPL-MCNC: 120 MG/DL
CREAT SERPL-MCNC: 0.69 MG/DL (ref 0.51–0.95)
DEPRECATED HCO3 PLAS-SCNC: 23 MMOL/L (ref 22–29)
EOSINOPHIL # BLD AUTO: 0.5 10E3/UL (ref 0–0.7)
EOSINOPHIL NFR BLD AUTO: 6 %
ERYTHROCYTE [DISTWIDTH] IN BLOOD BY AUTOMATED COUNT: 13.1 % (ref 10–15)
GFR SERPL CREATININE-BSD FRML MDRD: >90 ML/MIN/1.73M2
GLUCOSE SERPL-MCNC: 102 MG/DL (ref 70–99)
HCT VFR BLD AUTO: 39.5 % (ref 35–47)
HDLC SERPL-MCNC: 37 MG/DL
HGB BLD-MCNC: 13 G/DL (ref 11.7–15.7)
IMM GRANULOCYTES # BLD: 0 10E3/UL
IMM GRANULOCYTES NFR BLD: 0 %
LDLC SERPL CALC-MCNC: 61 MG/DL
LYMPHOCYTES # BLD AUTO: 1.2 10E3/UL (ref 0.8–5.3)
LYMPHOCYTES NFR BLD AUTO: 14 %
MCH RBC QN AUTO: 29.1 PG (ref 26.5–33)
MCHC RBC AUTO-ENTMCNC: 32.9 G/DL (ref 31.5–36.5)
MCV RBC AUTO: 88 FL (ref 78–100)
MONOCYTES # BLD AUTO: 0.9 10E3/UL (ref 0–1.3)
MONOCYTES NFR BLD AUTO: 10 %
NEUTROPHILS # BLD AUTO: 6.1 10E3/UL (ref 1.6–8.3)
NEUTROPHILS NFR BLD AUTO: 70 %
NONHDLC SERPL-MCNC: 83 MG/DL
PLATELET # BLD AUTO: 289 10E3/UL (ref 150–450)
POTASSIUM SERPL-SCNC: 4.6 MMOL/L (ref 3.4–5.3)
PROT SERPL-MCNC: 7.5 G/DL (ref 6.4–8.3)
RBC # BLD AUTO: 4.47 10E6/UL (ref 3.8–5.2)
SODIUM SERPL-SCNC: 141 MMOL/L (ref 136–145)
TRIGL SERPL-MCNC: 109 MG/DL
WBC # BLD AUTO: 8.8 10E3/UL (ref 4–11)

## 2022-10-27 PROCEDURE — 85025 COMPLETE CBC W/AUTO DIFF WBC: CPT

## 2022-10-27 PROCEDURE — 80061 LIPID PANEL: CPT

## 2022-10-27 PROCEDURE — 36415 COLL VENOUS BLD VENIPUNCTURE: CPT

## 2022-10-27 PROCEDURE — 80053 COMPREHEN METABOLIC PANEL: CPT

## 2022-11-02 ENCOUNTER — OFFICE VISIT (OUTPATIENT)
Dept: FAMILY MEDICINE | Facility: CLINIC | Age: 72
End: 2022-11-02
Payer: COMMERCIAL

## 2022-11-02 VITALS
WEIGHT: 263.6 LBS | HEART RATE: 63 BPM | TEMPERATURE: 97.9 F | HEIGHT: 62 IN | OXYGEN SATURATION: 98 % | DIASTOLIC BLOOD PRESSURE: 72 MMHG | BODY MASS INDEX: 48.51 KG/M2 | SYSTOLIC BLOOD PRESSURE: 144 MMHG

## 2022-11-02 DIAGNOSIS — E21.3 HYPERPARATHYROIDISM (H): ICD-10-CM

## 2022-11-02 DIAGNOSIS — I10 ESSENTIAL HYPERTENSION: Primary | ICD-10-CM

## 2022-11-02 DIAGNOSIS — E66.01 MORBID (SEVERE) OBESITY DUE TO EXCESS CALORIES (H): ICD-10-CM

## 2022-11-02 DIAGNOSIS — Z12.11 SCREEN FOR COLON CANCER: ICD-10-CM

## 2022-11-02 PROCEDURE — 99214 OFFICE O/P EST MOD 30 MIN: CPT | Performed by: FAMILY MEDICINE

## 2022-11-02 RX ORDER — LISINOPRIL AND HYDROCHLOROTHIAZIDE 20; 25 MG/1; MG/1
2 TABLET ORAL DAILY
Qty: 180 TABLET | Refills: 0
Start: 2022-11-02 | End: 2022-12-01

## 2022-11-02 RX ORDER — SENNOSIDES 8.6 MG
CAPSULE ORAL EVERY 12 HOURS
COMMUNITY
Start: 2022-08-30

## 2022-11-02 NOTE — PROGRESS NOTES
"  Assessment & Plan     Screen for colon cancer  Ok to reschedule colonoscopy    Morbid severe obesity with BMI of 45.0-49.9, adult (H)  Recommend diet and exercise.  Severe with comorbidity of HTN    Essential hypertension  Comorbidity with severe obesity and recommend diet and exercise.  Will increase medication and check labs in 2 weeks  - lisinopril-hydrochlorothiazide (ZESTORETIC) 20-25 MG tablet  Dispense: 180 tablet; Refill: 0  - Basic metabolic panel  (Ca, Cl, CO2, Creat, Gluc, K, Na, BUN)    Hyperparathyroidism   Stable.  Reviewed labs               Return in about 6 months (around 5/2/2023) for Follow up.    Jacqui Molina MD  [facility] FÁTIMA Manzanares is a 72 year old, presenting for the following health issues:  Hypertension (BP was to high to have colonoscopy completed- discuss increasing the Lisinopril-HCTZ)      HPI     Hypertension Follow-up      Do you check your blood pressure regularly outside of the clinic? Yes     Are you following a low salt diet? Yes    Are your blood pressures ever more than 140 on the top number (systolic) OR more   than 90 on the bottom number (diastolic), for example 140/90? Yes    Checking bp at home and running 127/62 to the highest at 171/84.  Checking bp every few days, no blurry vision, no HA, no cp or sob.  On amlodipine 10mg daily and currently once a day zestoretic.            Review of Systems   Constitutional, HEENT, cardiovascular, pulmonary, gi and gu systems are negative, except as otherwise noted.      Objective    BP (!) 144/72 (BP Location: Right arm, Patient Position: Sitting, Cuff Size: Adult Regular)   Pulse 63   Temp 97.9  F (36.6  C) (Oral)   Ht 1.581 m (5' 2.25\")   Wt 119.6 kg (263 lb 9.6 oz)   SpO2 98%   BMI 47.83 kg/m    Body mass index is 47.83 kg/m .  Physical Exam   GENERAL: healthy, alert and no distress  NECK: no adenopathy, no asymmetry, masses, or scars and thyroid normal to palpation  RESP: lungs clear " to auscultation - no rales, rhonchi or wheezes  CV: regular rate and rhythm, normal S1 S2, no S3 or S4, no murmur, click or rub, no peripheral edema and peripheral pulses strong  ABDOMEN: soft, nontender, no hepatosplenomegaly, no masses and bowel sounds normal  MS: no gross musculoskeletal defects noted, no edema

## 2022-11-22 ENCOUNTER — MYC MEDICAL ADVICE (OUTPATIENT)
Dept: FAMILY MEDICINE | Facility: CLINIC | Age: 72
End: 2022-11-22

## 2022-11-28 ENCOUNTER — LAB (OUTPATIENT)
Dept: LAB | Facility: CLINIC | Age: 72
End: 2022-11-28
Payer: COMMERCIAL

## 2022-11-28 DIAGNOSIS — E66.01 MORBID (SEVERE) OBESITY DUE TO EXCESS CALORIES (H): ICD-10-CM

## 2022-11-28 DIAGNOSIS — R73.9 HYPERGLYCEMIA: ICD-10-CM

## 2022-11-28 DIAGNOSIS — E66.01 MORBID (SEVERE) OBESITY DUE TO EXCESS CALORIES (H): Primary | ICD-10-CM

## 2022-11-28 DIAGNOSIS — I10 ESSENTIAL HYPERTENSION: ICD-10-CM

## 2022-11-28 LAB
ANION GAP SERPL CALCULATED.3IONS-SCNC: 14 MMOL/L (ref 7–15)
BUN SERPL-MCNC: 19.2 MG/DL (ref 8–23)
CALCIUM SERPL-MCNC: 9.5 MG/DL (ref 8.8–10.2)
CHLORIDE SERPL-SCNC: 107 MMOL/L (ref 98–107)
CREAT SERPL-MCNC: 0.66 MG/DL (ref 0.51–0.95)
DEPRECATED HCO3 PLAS-SCNC: 18 MMOL/L (ref 22–29)
GFR SERPL CREATININE-BSD FRML MDRD: >90 ML/MIN/1.73M2
GLUCOSE SERPL-MCNC: 131 MG/DL (ref 70–99)
HOLD SPECIMEN: NORMAL
POTASSIUM SERPL-SCNC: 4.9 MMOL/L (ref 3.4–5.3)
SODIUM SERPL-SCNC: 139 MMOL/L (ref 136–145)

## 2022-11-28 PROCEDURE — 36415 COLL VENOUS BLD VENIPUNCTURE: CPT

## 2022-11-28 PROCEDURE — 80048 BASIC METABOLIC PNL TOTAL CA: CPT

## 2022-11-28 PROCEDURE — 83036 HEMOGLOBIN GLYCOSYLATED A1C: CPT

## 2022-11-30 LAB — HBA1C MFR BLD: 5.8 % (ref 0–5.6)

## 2022-12-01 ENCOUNTER — MYC REFILL (OUTPATIENT)
Dept: FAMILY MEDICINE | Facility: CLINIC | Age: 72
End: 2022-12-01

## 2022-12-01 DIAGNOSIS — I10 ESSENTIAL HYPERTENSION: ICD-10-CM

## 2022-12-02 RX ORDER — LISINOPRIL AND HYDROCHLOROTHIAZIDE 20; 25 MG/1; MG/1
2 TABLET ORAL DAILY
Qty: 60 TABLET | Refills: 0 | Status: SHIPPED | OUTPATIENT
Start: 2022-12-02 | End: 2022-12-27

## 2022-12-02 NOTE — TELEPHONE ENCOUNTER
"Routing refill request to provider for review/approval because:  bp out of range    Last Written Prescription Date:  11/2/22 no print out  Last Fill Quantity: ,  # refills:    Last office visit provider:  11/2/22     Requested Prescriptions   Pending Prescriptions Disp Refills     lisinopril-hydrochlorothiazide (ZESTORETIC) 20-25 MG tablet 180 tablet 0     Sig: Take 2 tablets by mouth daily       Diuretics (Including Combos) Protocol Failed - 12/1/2022  8:33 PM        Failed - Blood pressure under 140/90 in past 12 months     BP Readings from Last 3 Encounters:   11/02/22 (!) 144/72   07/27/22 (!) 160/70   01/21/21 130/80                 Passed - Recent (12 mo) or future (30 days) visit within the authorizing provider's specialty     Patient has had an office visit with the authorizing provider or a provider within the authorizing providers department within the previous 12 mos or has a future within next 30 days. See \"Patient Info\" tab in inbasket, or \"Choose Columns\" in Meds & Orders section of the refill encounter.              Passed - Medication is active on med list        Passed - Patient is age 18 or older        Passed - No active pregancy on record        Passed - Normal serum creatinine on file in past 12 months     Recent Labs   Lab Test 11/28/22  1314   CR 0.66              Passed - Normal serum potassium on file in past 12 months     Recent Labs   Lab Test 11/28/22  1314   POTASSIUM 4.9                    Passed - Normal serum sodium on file in past 12 months     Recent Labs   Lab Test 11/28/22  1314                 Passed - No positive pregnancy test in past 12 months       ACE Inhibitors (Including Combos) Protocol Failed - 12/1/2022  8:33 PM        Failed - Blood pressure under 140/90 in past 12 months     BP Readings from Last 3 Encounters:   11/02/22 (!) 144/72   07/27/22 (!) 160/70   01/21/21 130/80                 Passed - Recent (12 mo) or future (30 days) visit within the authorizing " "provider's specialty     Patient has had an office visit with the authorizing provider or a provider within the authorizing providers department within the previous 12 mos or has a future within next 30 days. See \"Patient Info\" tab in inbasket, or \"Choose Columns\" in Meds & Orders section of the refill encounter.              Passed - Medication is active on med list        Passed - Patient is age 18 or older        Passed - No active pregnancy on record        Passed - Normal serum creatinine on file in past 12 months     Recent Labs   Lab Test 11/28/22  1314   CR 0.66       Ok to refill medication if creatinine is low          Passed - Normal serum potassium on file in past 12 months     Recent Labs   Lab Test 11/28/22  1314   POTASSIUM 4.9             Passed - No positive pregnancy test within past 12 months             [place], Chanda, RN 12/02/22 12:41 PM  "

## 2023-04-27 DIAGNOSIS — N32.81 OAB (OVERACTIVE BLADDER): ICD-10-CM

## 2023-04-27 DIAGNOSIS — E78.5 DYSLIPIDEMIA: ICD-10-CM

## 2023-04-27 RX ORDER — GEMFIBROZIL 600 MG/1
600 TABLET, FILM COATED ORAL 2 TIMES DAILY
Qty: 180 TABLET | Refills: 3 | OUTPATIENT
Start: 2023-04-27

## 2023-04-27 RX ORDER — OXYBUTYNIN CHLORIDE 5 MG/1
5 TABLET ORAL 2 TIMES DAILY
Qty: 180 TABLET | Refills: 3 | OUTPATIENT
Start: 2023-04-27

## 2023-04-27 NOTE — TELEPHONE ENCOUNTER
"Last Written Prescription Date:  7/27/22  Last Fill Quantity: 180,  # refills: 3   Last office visit: 11/2/2022     Last Written Prescription Date:  7/27/22  Last Fill Quantity: 180,  # refills: 3   Last office visit provider:   11/2/22    Requested Prescriptions   Pending Prescriptions Disp Refills     oxybutynin (DITROPAN) 5 MG tablet 180 tablet 3     Sig: Take 1 tablet (5 mg) by mouth 2 times daily       Muscarinic Antagonists (Urinary Incontinence Agents) Passed - 4/27/2023  9:43 AM        Passed - Recent (12 mo) or future (30 days) visit within the authorizing provider's specialty     Patient has had an office visit with the authorizing provider or a provider within the authorizing providers department within the previous 12 mos or has a future within next 30 days. See \"Patient Info\" tab in inMitroet, or \"Choose Columns\" in Meds & Orders section of the refill encounter.              Passed - Medication is Oxybutynin and patient is 5 years of age or older        Passed - Patient does not have a diagnosis of glaucoma on the problem list     If glaucoma diagnosis is new, refer refill to physician.          Passed - Medication is active on med list        Passed - Patient is 18 years of age or older           gemfibrozil (LOPID) 600 MG tablet 180 tablet 3     Sig: Take 1 tablet (600 mg) by mouth 2 times daily       Fibrates Passed - 4/27/2023  9:43 AM        Passed - Lipid panel on file in past 12 months     Recent Labs   Lab Test 10/27/22  1044   CHOL 120   TRIG 109   HDL 37*   LDL 61   NHDL 83               Passed - No abnormal creatine kinase in past 12 months     No lab results found.             Passed - Recent (12 mo) or future (30 days) visit within the authorizing provider's specialty     Patient has had an office visit with the authorizing provider or a provider within the authorizing providers department within the previous 12 mos or has a future within next 30 days. See \"Patient Info\" tab in inMitroet, or " "\"Choose Columns\" in Meds & Orders section of the refill encounter.              Passed - Medication is active on med list        Passed - Patient is age 18 or older        Passed - No active pregnancy on record        Passed - No positive pregnancy test in past 12 months             Luiza Thomason RN 04/27/23 4:13 PM  "

## 2023-06-04 ENCOUNTER — HEALTH MAINTENANCE LETTER (OUTPATIENT)
Age: 73
End: 2023-06-04

## 2023-06-27 ENCOUNTER — PATIENT OUTREACH (OUTPATIENT)
Dept: CARE COORDINATION | Facility: CLINIC | Age: 73
End: 2023-06-27
Payer: COMMERCIAL

## 2023-07-11 ENCOUNTER — PATIENT OUTREACH (OUTPATIENT)
Dept: CARE COORDINATION | Facility: CLINIC | Age: 73
End: 2023-07-11
Payer: COMMERCIAL

## 2023-08-08 ENCOUNTER — HOSPITAL ENCOUNTER (EMERGENCY)
Facility: HOSPITAL | Age: 73
Discharge: HOME | End: 2023-08-08
Attending: EMERGENCY MEDICINE | Admitting: EMERGENCY MEDICINE
Payer: COMMERCIAL

## 2023-08-08 VITALS
DIASTOLIC BLOOD PRESSURE: 82 MMHG | TEMPERATURE: 98 F | RESPIRATION RATE: 18 BRPM | OXYGEN SATURATION: 95 % | WEIGHT: 263 LBS | HEART RATE: 65 BPM | BODY MASS INDEX: 47.72 KG/M2 | SYSTOLIC BLOOD PRESSURE: 198 MMHG

## 2023-08-08 DIAGNOSIS — K02.9 DENTAL DECAY: ICD-10-CM

## 2023-08-08 DIAGNOSIS — I15.8 OTHER SECONDARY HYPERTENSION: ICD-10-CM

## 2023-08-08 DIAGNOSIS — Z76.0 ENCOUNTER FOR MEDICATION REFILL: ICD-10-CM

## 2023-08-08 LAB
ANION GAP SERPL CALCULATED.3IONS-SCNC: 12 MMOL/L (ref 7–15)
BASOPHILS # BLD AUTO: 0.1 10E3/UL (ref 0–0.2)
BASOPHILS NFR BLD AUTO: 1 %
BUN SERPL-MCNC: 16.3 MG/DL (ref 8–23)
CALCIUM SERPL-MCNC: 10.3 MG/DL (ref 8.8–10.2)
CHLORIDE SERPL-SCNC: 107 MMOL/L (ref 98–107)
CREAT SERPL-MCNC: 0.68 MG/DL (ref 0.51–0.95)
DEPRECATED HCO3 PLAS-SCNC: 22 MMOL/L (ref 22–29)
EOSINOPHIL # BLD AUTO: 0.4 10E3/UL (ref 0–0.7)
EOSINOPHIL NFR BLD AUTO: 3 %
ERYTHROCYTE [DISTWIDTH] IN BLOOD BY AUTOMATED COUNT: 13.2 % (ref 10–15)
GFR SERPL CREATININE-BSD FRML MDRD: >90 ML/MIN/1.73M2
GLUCOSE SERPL-MCNC: 144 MG/DL (ref 70–99)
HCT VFR BLD AUTO: 39.5 % (ref 35–47)
HGB BLD-MCNC: 13 G/DL (ref 11.7–15.7)
HOLD SPECIMEN: NORMAL
HOLD SPECIMEN: NORMAL
IMM GRANULOCYTES # BLD: 0 10E3/UL
IMM GRANULOCYTES NFR BLD: 0 %
LYMPHOCYTES # BLD AUTO: 1.2 10E3/UL (ref 0.8–5.3)
LYMPHOCYTES NFR BLD AUTO: 10 %
MCH RBC QN AUTO: 29.1 PG (ref 26.5–33)
MCHC RBC AUTO-ENTMCNC: 32.9 G/DL (ref 31.5–36.5)
MCV RBC AUTO: 88 FL (ref 78–100)
MONOCYTES # BLD AUTO: 1 10E3/UL (ref 0–1.3)
MONOCYTES NFR BLD AUTO: 9 %
NEUTROPHILS # BLD AUTO: 8.7 10E3/UL (ref 1.6–8.3)
NEUTROPHILS NFR BLD AUTO: 77 %
NRBC # BLD AUTO: 0 10E3/UL
NRBC BLD AUTO-RTO: 0 /100
PLATELET # BLD AUTO: 293 10E3/UL (ref 150–450)
POTASSIUM SERPL-SCNC: 4.6 MMOL/L (ref 3.4–5.3)
RBC # BLD AUTO: 4.47 10E6/UL (ref 3.8–5.2)
SODIUM SERPL-SCNC: 141 MMOL/L (ref 136–145)
WBC # BLD AUTO: 11.3 10E3/UL (ref 4–11)

## 2023-08-08 PROCEDURE — 99284 EMERGENCY DEPT VISIT MOD MDM: CPT

## 2023-08-08 PROCEDURE — 36415 COLL VENOUS BLD VENIPUNCTURE: CPT | Performed by: STUDENT IN AN ORGANIZED HEALTH CARE EDUCATION/TRAINING PROGRAM

## 2023-08-08 PROCEDURE — 80048 BASIC METABOLIC PNL TOTAL CA: CPT | Performed by: EMERGENCY MEDICINE

## 2023-08-08 PROCEDURE — 82310 ASSAY OF CALCIUM: CPT | Performed by: STUDENT IN AN ORGANIZED HEALTH CARE EDUCATION/TRAINING PROGRAM

## 2023-08-08 PROCEDURE — 85004 AUTOMATED DIFF WBC COUNT: CPT | Performed by: STUDENT IN AN ORGANIZED HEALTH CARE EDUCATION/TRAINING PROGRAM

## 2023-08-08 PROCEDURE — 85025 COMPLETE CBC W/AUTO DIFF WBC: CPT | Performed by: EMERGENCY MEDICINE

## 2023-08-08 RX ORDER — METOPROLOL SUCCINATE 25 MG/1
25 TABLET, EXTENDED RELEASE ORAL DAILY
Qty: 30 TABLET | Refills: 2 | Status: SHIPPED | OUTPATIENT
Start: 2023-08-08 | End: 2023-11-02

## 2023-08-08 RX ORDER — METOPROLOL SUCCINATE 50 MG/1
50 TABLET, EXTENDED RELEASE ORAL DAILY
Qty: 30 TABLET | Refills: 2 | Status: SHIPPED | OUTPATIENT
Start: 2023-08-08 | End: 2023-11-02

## 2023-08-08 NOTE — ED PROVIDER NOTES
EMERGENCY DEPARTMENT ENCOUNTER      NAME: Glenna Estrada  AGE: 72 year old female  YOB: 1950  MRN: [id number]  EVALUATION DATE & TIME: 8/8/2023  5:32 PM    PCP: Jacqui Molina    ED PROVIDER: Ralph Jimenez M.D.      Chief Complaint   Patient presents with    Hypertension         FINAL IMPRESSION:  Hypertension  Medication refill  Dental decay      ED COURSE & MEDICAL DECISION MAKING:    Pertinent Labs & Imaging studies reviewed. (See chart for details)  72 year old female presents to the Emergency Department for evaluation of feeling fuzzy.  Patient also concerned about blood pressure remarkably elevated.  However patient has been out of her metoprolol for 3 days.  Blood pressure on arrival approximately 200 systolic.  Patient had laboratory evaluation obtained from triage to assess for renal insufficiency, anemia this is unremarkable.  Review of records indicate patient with typical pressure around 160 systolic per primary care visit in July of last year.  Patient with a normal exam other than extensive dental decay.  The daughter who accompanies her is concerned about this might be related to her feeling fuzzy.  Much more likely it is related to her not taking her metoprolol.  However she was cautioned about the absolute need to see a dentist and have her dental decay taken care of due to long-term health issues which may result.  She understood this well.. Patient appears non toxic with stable vitals signs. Overall exam is benign.        6:05 PM I met with the patient for the initial interview and physical examination. Discussed plan for treatment and workup in the ED.      At the conclusion of the encounter I discussed the results of all of the tests and the disposition. The questions were answered and return precautions provided. The patient or family acknowledged understanding and was agreeable with the care plan.     Medical Decision Making    History:  Supplemental history from: Documented in  chart, if applicable  External Record(s) reviewed: Documented in chart, if applicable.    Work Up:  Chart documentation includes differential considered and any EKGs or imaging independently interpreted by provider, where specified.  In additional to work up documented, I considered the following work up: Documented in chart, if applicable.    External consultation:  Discussion of management with another provider: Documented in chart, if applicable    Complicating factors:  Care impacted by chronic illness: Diabetes and Hypertension  Care affected by social determinants of health: Access to Medical Care    Disposition considerations: Discharge. I prescribed additional prescription strength medication(s) as charted. I considered admission, but discharged patient after significant clinical improvement.           MEDICATIONS GIVEN IN THE EMERGENCY:  Medications - No data to display    NEW PRESCRIPTIONS STARTED AT TODAY'S ER VISIT  Current Discharge Medication List        START taking these medications    Details   !! metoprolol succinate ER (TOPROL XL) 25 MG 24 hr tablet Take 1 tablet (25 mg) by mouth daily  Qty: 30 tablet, Refills: 2      !! metoprolol succinate ER (TOPROL XL) 50 MG 24 hr tablet Take 1 tablet (50 mg) by mouth daily  Qty: 30 tablet, Refills: 2       !! - Potential duplicate medications found. Please discuss with provider.              =================================================================    HPI    Patient information was obtained from: Patient     Use of Intrepreter: N/A       Glenna Estrada is a 72 year old female with a pertient medical history of HTN, hypothyroidism, DM, anemia, metabolic syndrome, DVT, morbid obesity, who presents to the ED for evaluation of head fuzziness.     Per Chart Review, patient was seen at [facility] on 07/27/22 for a preventative visit. /70. Patient was discharged with a prescription of Amlodipine and Metoprolol succinate.      Patient endorses a PMH of HTN and taking Metoprolol succinate and Amlodipine for approximately 9 months. She denies any recent changes to her blood pressure medications. She states that approximately 4 days ago she ran out of her Metoprolol succinate, and reports that recently she has developed head fuzziness which she believes is secondary to hypertension. She also mentions that she can hear that her blood pressure is elevated in her ears. She denies any recent shortness of breath, or any other complications at this time.       REVIEW OF SYSTEMS   Constitutional:  Denies fever, chills  Respiratory:  Denies productive cough or increased work of breathing  Cardiovascular:  Denies chest pain, palpitations  GI:  Denies abdominal pain, nausea, vomiting, or change in bowel or bladder habits   Musculoskeletal:  Denies any new muscle/joint swelling  Skin:  Denies rash   Neurologic: Positive for head fuzziness. Denies focal weakness  All systems negative except as marked.     PAST MEDICAL HISTORY:  Past Medical History:   Diagnosis Date    Anemia     Arthritis     Diabetes mellitus (H)     Disease of thyroid gland     Dyslipidemia     History of blood clots     DVT    History of blood transfusion reaction     Rash    History of transfusion     Hyperparathyroidism (H)     Lymphedema     Metabolic syndrome     Morbid obesity with BMI of 50.0-59.9, adult (H)     Other and unspecified postsurgical nonabsorption        PAST SURGICAL HISTORY:  Past Surgical History:   Procedure Laterality Date    ABDOMEN SURGERY      ARTHROSCOPY SHOULDER ROTATOR CUFF REPAIR      JOINT REPLACEMENT Left     ESTEFANÍA    CA LAP GASTRIC BYPASS/CIERRA-EN-Y N/A 6/9/2014    Procedure: LAPAROSCOPIC CIERRA-EN-Y GASTRIC BYPASS WITH OMENTOPEXY, POSSIBLE OPEN (Room 4710);  Surgeon: Timbo Sweet MD;  Location: [facility];  Service: General    TONSILLECTOMY & ADENOIDECTOMY      TOTAL HIP ARTHROPLASTY Left 12/31/2014    Procedure: LEFT TOTAL HIP  ARTHROPLASTY;  Surgeon: Landon Canales MD;  [facility];  Service:     TUBAL LIGATION Bilateral     VASCULAR SURGERY  June 2014    Right leg skin graft         CURRENT MEDICATIONS:    No current facility-administered medications for this encounter.    Current Outpatient Medications:     acetaminophen (TYLENOL) 650 MG CR tablet, Take by mouth every 12 hours, Disp: , Rfl:     acetaminophen (TYLENOL) 650 MG CR tablet, [ACETAMINOPHEN (TYLENOL) 650 MG CR TABLET] Take 1,300 mg by mouth every 8 (eight) hours as needed for pain., Disp: , Rfl:     amLODIPine (NORVASC) 10 MG tablet, Take 1 tablet (10 mg) by mouth daily, Disp: 90 tablet, Rfl: 3    ascorbic acid (ASCORBIC ACID WITH FELICE HIPS) 500 MG tablet, [ASCORBIC ACID (ASCORBIC ACID WITH FELICE HIPS) 500 MG TABLET] Take 500 mg by mouth daily., Disp: , Rfl:     aspirin 81 MG EC tablet, [ASPIRIN 81 MG EC TABLET] Take 81 mg by mouth daily., Disp: , Rfl:     Biotin 5000 MCG TABS, Take 5,000 mcg/day by mouth daily, Disp: , Rfl:     calcium citrate-vitamin D (CITRACAL+D) 315-200 mg-unit per tablet, [CALCIUM CITRATE-VITAMIN D (CITRACAL+D) 315-200 MG-UNIT PER TABLET] Take 1 tablet by mouth 2 (two) times a day., Disp: , Rfl:     cholecalciferol, vitamin D3, 5,000 unit Tab, [CHOLECALCIFEROL, VITAMIN D3, 5,000 UNIT TAB] Take 1 tablet by mouth daily., Disp: , Rfl:     cyanocobalamin, vitamin B-12, (VITAMIN B-12) 5,000 mcg Subl, [CYANOCOBALAMIN, VITAMIN B-12, (VITAMIN B-12) 5,000 MCG SUBL] Place 5,000 mcg under the tongue daily., Disp: , Rfl:     folic acid (FOLVITE) 800 MCG tablet, [FOLIC ACID (FOLVITE) 800 MCG TABLET] Take 800 mcg by mouth daily. , Disp: , Rfl:     gemfibrozil (LOPID) 600 MG tablet, Take 1 tablet (600 mg) by mouth 2 times daily, Disp: 180 tablet, Rfl: 1    glucosamine-chondroitin 500-400 mg tablet, [GLUCOSAMINE-CHONDROITIN 500-400 MG TABLET] Take 1 tablet by mouth daily., Disp: , Rfl:     Lactobacillus rhamnosus GG (CULTURELLE) 10-15 Billion cell  capsule, [LACTOBACILLUS RHAMNOSUS GG (CULTURELLE) 10-15 BILLION CELL CAPSULE] Take 1 capsule by mouth daily., Disp: , Rfl:     levothyroxine (SYNTHROID/LEVOTHROID) 150 MCG tablet, Take 1 tablet (150 mcg) by mouth daily, Disp: 90 tablet, Rfl: 3    lisinopril-hydrochlorothiazide (ZESTORETIC) 20-25 MG tablet, Take 2 tablets by mouth daily, Disp: 180 tablet, Rfl: 3    metoprolol succinate ER (TOPROL XL) 50 MG 24 hr tablet, Take 1.5 tablets (75 mg) by mouth daily, Disp: 135 tablet, Rfl: 3    multivitamin with minerals tablet, [MULTIVITAMIN WITH MINERALS TABLET] Take 1 tablet by mouth 2 (two) times a day., Disp: , Rfl:     oxybutynin (DITROPAN) 5 MG tablet, Take 1 tablet (5 mg) by mouth 2 times daily, Disp: 180 tablet, Rfl: 1    thiamine (VITAMIN B-1) 50 MG tablet, [THIAMINE (VITAMIN B-1) 50 MG TABLET] Take 100 mg by mouth daily., Disp: , Rfl:     vit A,C & E-lutein-minerals (OCUVITE WITH LUTEIN) 1,000 unit-200 mg-60 unit-2 mg Tab, [VIT A,C & E-LUTEIN-MINERALS (OCUVITE WITH LUTEIN) 1,000 UNIT-200 MG-60 UNIT-2 MG TAB] Take 1 tablet by mouth daily., Disp: , Rfl:     ALLERGIES:  Allergies   Allergen Reactions    Penicillins Hives    Latex Rash     Pt states she can tolerate short-term    Silver [Silver Protein] Rash       FAMILY HISTORY:  Family History   Problem Relation Age of Onset    Breast Cancer Mother     Heart Disease Father     Early Death Father     Pancreatic Cancer Sister     Heart Disease Brother     Heart Disease Paternal Grandfather        SOCIAL HISTORY:   Social History     Socioeconomic History    Marital status:    Tobacco Use    Smoking status: Former     Packs/day: 1.50     Years: 20.00     Pack years: 30.00     Types: Cigarettes     Quit date: 1992     Years since quittin.7    Smokeless tobacco: Never   Vaping Use    Vaping Use: Never used   Substance and Sexual Activity    Alcohol use: Yes     Comment: Alcoholic Drinks/day: rarely    Drug use: No    Sexual activity: Not Currently      Birth control/protection: Post-menopausal       VITALS:  Patient Vitals for the past 24 hrs:   BP Temp Temp src Pulse Resp SpO2 Weight   08/08/23 1815 -- -- -- 65 -- 95 % --   08/08/23 1809 -- -- -- 73 -- 100 % --   08/08/23 1806 (!) 198/82 -- -- 78 -- 100 % --   08/08/23 1722 (!) 191/84 -- -- 76 -- 99 % --   08/08/23 1526 (!) 195/89 98  F (36.7  C) Temporal 113 18 100 % 119.3 kg (263 lb)        PHYSICAL EXAM    Constitutional:  Awake, alert, in no apparent distress  HENT:  Exteremly bad dentition. Normocephalic, Atraumatic. Bilateral external ears normal. Oropharynx moist. Nose normal. Neck- Normal range of motion with no guarding, No midline cervical tenderness, Supple, No stridor.   Eyes:  PERRL, EOMI with no signs of entrapment, Conjunctiva normal, No discharge.   Respiratory:  Normal breath sounds, No respiratory distress, No wheezing.    Cardiovascular:  Normal heart rate, Normal rhythm, No appreciable rubs or gallops.   GI:  Soft, No tenderness, No distension, No palpable masses  Musculoskeletal:  No edema. Good range of motion in all major joints. No tenderness to palpation or major deformities noted.  Integument:  Warm, Dry, No erythema, No rash.   Neurologic:  Alert & oriented, Normal motor function, Normal sensory function, No focal deficits noted.   Psychiatric:  Affect normal, Judgment normal, Mood normal.     LAB:  All pertinent labs reviewed and interpreted.  Results for orders placed or performed during the hospital encounter of 08/08/23   Basic metabolic panel   Result Value Ref Range    Sodium 141 136 - 145 mmol/L    Potassium 4.6 3.4 - 5.3 mmol/L    Chloride 107 98 - 107 mmol/L    Carbon Dioxide (CO2) 22 22 - 29 mmol/L    Anion Gap 12 7 - 15 mmol/L    Urea Nitrogen 16.3 8.0 - 23.0 mg/dL    Creatinine 0.68 0.51 - 0.95 mg/dL    Calcium 10.3 (H) 8.8 - 10.2 mg/dL    Glucose 144 (H) 70 - 99 mg/dL    GFR Estimate >90 >60 mL/min/1.73m2   Extra Blue Top Tube   Result Value Ref Range    Hold Specimen JIC     Extra Red Top Tube   Result Value Ref Range    Hold Specimen JIC    CBC with platelets and differential   Result Value Ref Range    WBC Count 11.3 (H) 4.0 - 11.0 10e3/uL    RBC Count 4.47 3.80 - 5.20 10e6/uL    Hemoglobin 13.0 11.7 - 15.7 g/dL    Hematocrit 39.5 35.0 - 47.0 %    MCV 88 78 - 100 fL    MCH 29.1 26.5 - 33.0 pg    MCHC 32.9 31.5 - 36.5 g/dL    RDW 13.2 10.0 - 15.0 %    Platelet Count 293 150 - 450 10e3/uL    % Neutrophils 77 %    % Lymphocytes 10 %    % Monocytes 9 %    % Eosinophils 3 %    % Basophils 1 %    % Immature Granulocytes 0 %    NRBCs per 100 WBC 0 <1 /100    Absolute Neutrophils 8.7 (H) 1.6 - 8.3 10e3/uL    Absolute Lymphocytes 1.2 0.8 - 5.3 10e3/uL    Absolute Monocytes 1.0 0.0 - 1.3 10e3/uL    Absolute Eosinophils 0.4 0.0 - 0.7 10e3/uL    Absolute Basophils 0.1 0.0 - 0.2 10e3/uL    Absolute Immature Granulocytes 0.0 <=0.4 10e3/uL    Absolute NRBCs 0.0 10e3/uL       RADIOLOGY:  Reviewed all pertinent imaging. Please see official radiology report.  No orders to display       PROCEDURES:   None.       I, Vidal Robles, am serving as a scribe to document services personally performed by Ralph Jimenez MD, based on my observation and the provider's statements to me. I, Ralph Jimenez MD attest that Vidal Robles is acting in a scribe capacity, has observed my performance of the services and has documented them in accordance with my direction.    Ralph Jimenez M.D.  Emergency Medicine  [facility] EMERGENCY DEPARTMENT       Ralph Jimenez MD  08/08/23 2771

## 2023-08-08 NOTE — ED TRIAGE NOTES
"The pt presents for evaluation of head and neck \"fuzziness\" and headaches, as well as hearing her heart beat in her ears. She reports these things have been ongoing for several days, but today she noted her BP was elevated above baseline. At this time she feels somewhat fuzzy.         "

## 2023-08-09 DIAGNOSIS — N32.81 OAB (OVERACTIVE BLADDER): ICD-10-CM

## 2023-08-09 DIAGNOSIS — E78.5 DYSLIPIDEMIA: ICD-10-CM

## 2023-08-09 RX ORDER — OXYBUTYNIN CHLORIDE 5 MG/1
5 TABLET ORAL 2 TIMES DAILY
Qty: 180 TABLET | Refills: 1 | OUTPATIENT
Start: 2023-08-09

## 2023-08-09 RX ORDER — GEMFIBROZIL 600 MG/1
600 TABLET, FILM COATED ORAL 2 TIMES DAILY
Qty: 180 TABLET | Refills: 1 | OUTPATIENT
Start: 2023-08-09

## 2023-09-08 DIAGNOSIS — E03.9 HYPOTHYROIDISM, UNSPECIFIED TYPE: ICD-10-CM

## 2023-09-08 DIAGNOSIS — I10 ESSENTIAL HYPERTENSION: ICD-10-CM

## 2023-09-11 RX ORDER — AMLODIPINE BESYLATE 10 MG/1
10 TABLET ORAL DAILY
Qty: 90 TABLET | Refills: 3 | Status: SHIPPED | OUTPATIENT
Start: 2023-09-11 | End: 2024-09-16

## 2023-09-11 RX ORDER — LEVOTHYROXINE SODIUM 150 UG/1
150 TABLET ORAL DAILY
Qty: 90 TABLET | Refills: 3 | Status: SHIPPED | OUTPATIENT
Start: 2023-09-11 | End: 2023-10-05

## 2023-10-04 ENCOUNTER — OFFICE VISIT (OUTPATIENT)
Dept: FAMILY MEDICINE | Facility: CLINIC | Age: 73
End: 2023-10-04
Payer: COMMERCIAL

## 2023-10-04 VITALS
DIASTOLIC BLOOD PRESSURE: 72 MMHG | HEIGHT: 65 IN | WEIGHT: 247.7 LBS | BODY MASS INDEX: 41.27 KG/M2 | HEART RATE: 64 BPM | SYSTOLIC BLOOD PRESSURE: 122 MMHG | OXYGEN SATURATION: 99 % | TEMPERATURE: 98.8 F | RESPIRATION RATE: 18 BRPM

## 2023-10-04 DIAGNOSIS — E21.3 HYPERPARATHYROIDISM (H): ICD-10-CM

## 2023-10-04 DIAGNOSIS — E03.9 HYPOTHYROIDISM, UNSPECIFIED TYPE: ICD-10-CM

## 2023-10-04 DIAGNOSIS — M81.0 AGE-RELATED OSTEOPOROSIS WITHOUT CURRENT PATHOLOGICAL FRACTURE: ICD-10-CM

## 2023-10-04 DIAGNOSIS — E78.5 DYSLIPIDEMIA: ICD-10-CM

## 2023-10-04 DIAGNOSIS — Z12.11 SCREEN FOR COLON CANCER: ICD-10-CM

## 2023-10-04 DIAGNOSIS — I10 ESSENTIAL HYPERTENSION: ICD-10-CM

## 2023-10-04 DIAGNOSIS — R73.03 PREDIABETES: Primary | ICD-10-CM

## 2023-10-04 LAB — HBA1C MFR BLD: 5.6 % (ref 0–5.6)

## 2023-10-04 PROCEDURE — 83036 HEMOGLOBIN GLYCOSYLATED A1C: CPT | Performed by: FAMILY MEDICINE

## 2023-10-04 PROCEDURE — 80053 COMPREHEN METABOLIC PANEL: CPT | Performed by: FAMILY MEDICINE

## 2023-10-04 PROCEDURE — 82570 ASSAY OF URINE CREATININE: CPT | Performed by: FAMILY MEDICINE

## 2023-10-04 PROCEDURE — 80061 LIPID PANEL: CPT | Performed by: FAMILY MEDICINE

## 2023-10-04 PROCEDURE — 82306 VITAMIN D 25 HYDROXY: CPT | Performed by: FAMILY MEDICINE

## 2023-10-04 PROCEDURE — 84439 ASSAY OF FREE THYROXINE: CPT | Performed by: FAMILY MEDICINE

## 2023-10-04 PROCEDURE — 82043 UR ALBUMIN QUANTITATIVE: CPT | Performed by: FAMILY MEDICINE

## 2023-10-04 PROCEDURE — 83970 ASSAY OF PARATHORMONE: CPT | Performed by: FAMILY MEDICINE

## 2023-10-04 PROCEDURE — 84443 ASSAY THYROID STIM HORMONE: CPT | Performed by: FAMILY MEDICINE

## 2023-10-04 PROCEDURE — 99214 OFFICE O/P EST MOD 30 MIN: CPT | Performed by: FAMILY MEDICINE

## 2023-10-04 PROCEDURE — 36415 COLL VENOUS BLD VENIPUNCTURE: CPT | Performed by: FAMILY MEDICINE

## 2023-10-04 RX ORDER — GEMFIBROZIL 600 MG/1
600 TABLET, FILM COATED ORAL 2 TIMES DAILY
Qty: 180 TABLET | Refills: 3 | Status: SHIPPED | OUTPATIENT
Start: 2023-10-04 | End: 2024-08-20

## 2023-10-04 RX ORDER — LISINOPRIL AND HYDROCHLOROTHIAZIDE 20; 25 MG/1; MG/1
2 TABLET ORAL DAILY
Qty: 180 TABLET | Refills: 3 | Status: SHIPPED | OUTPATIENT
Start: 2023-10-04 | End: 2024-09-24

## 2023-10-04 NOTE — PROGRESS NOTES
"  Assessment & Plan     Prediabetes  Prediabetic and will do labs.  Not checking sugars  - Albumin Random Urine Quantitative with Creat Ratio  - Lipid panel reflex to direct LDL Non-fasting  - Comprehensive metabolic panel (BMP + Alb, Alk Phos, ALT, AST, Total. Bili, TP)  - Albumin Random Urine Quantitative with Creat Ratio  - Lipid panel reflex to direct LDL Non-fasting  - Comprehensive metabolic panel (BMP + Alb, Alk Phos, ALT, AST, Total. Bili, TP)    Dyslipidemia  Stable and will check labs and do refill  - gemfibrozil (LOPID) 600 MG tablet  Dispense: 180 tablet; Refill: 3    Essential hypertension  Well controlled.  refill  - lisinopril-hydrochlorothiazide (ZESTORETIC) 20-25 MG tablet  Dispense: 180 tablet; Refill: 3    Screen for colon cancer  - Colonoscopy Screening  Referral    Hypothyroidism, unspecified type  Stable.  Will do US and labs  - TSH WITH FREE T4 REFLEX  - US Thyroid  - TSH WITH FREE T4 REFLEX    BMI 40.0-44.9, adult (H)  Recommend diet and exercise.  - HEMOGLOBIN A1C  - HEMOGLOBIN A1C    Age-related osteoporosis without current pathological fracture  Reviewed last DEXA and due next year    Hyperparathyroidism (H24)  Hx of but unknown when and no recent imaging.  Will do labs and US  - US Parathyroid  - Vitamin D Deficiency  - Vitamin D Deficiency               BMI:   Estimated body mass index is 40.9 kg/m  as calculated from the following:    Height as of this encounter: 1.657 m (5' 5.25\").    Weight as of this encounter: 112.4 kg (247 lb 11.2 oz).   Weight management plan: Discussed healthy diet and exercise guidelines        Jacqui Molina MD  [facility]    Rayna Manzanares is a 73 year old, presenting for the following health issues:  Hypertension (Follow up )      10/4/2023     1:34 PM   Additional Questions   Roomed by GAIL Pedraza       History of Present Illness       Hypertension: She presents for follow up of hypertension.  She does not check blood " "pressure  regularly outside of the clinic. Outpatient blood pressures have not been over 140/90. She follows a low salt diet.     She eats 2-3 servings of fruits and vegetables daily.She consumes 2 sweetened beverage(s) daily.She exercises with enough effort to increase her heart rate 9 or less minutes per day.  She exercises with enough effort to increase her heart rate 3 or less days per week.   She is taking medications regularly.                   Review of Systems   Constitutional, HEENT, cardiovascular, pulmonary, gi and gu systems are negative, except as otherwise noted.      Objective    /72 (BP Location: Right arm, Patient Position: Sitting, Cuff Size: Adult Regular)   Pulse 64   Temp 98.8  F (37.1  C) (Oral)   Resp 18   Ht 1.657 m (5' 5.25\")   Wt 112.4 kg (247 lb 11.2 oz)   LMP  (LMP Unknown)   SpO2 99%   BMI 40.90 kg/m    Body mass index is 40.9 kg/m .  Physical Exam   GENERAL: healthy, alert and no distress  NECK: no adenopathy, no asymmetry, masses, or scars and thyroid normal to palpation  RESP: lungs clear to auscultation - no rales, rhonchi or wheezes  CV: regular rate and rhythm, normal S1 S2, no S3 or S4, no murmur, click or rub, no peripheral edema and peripheral pulses strong  ABDOMEN: soft, nontender, no hepatosplenomegaly, no masses and bowel sounds normal  MS: no gross musculoskeletal defects noted, no edema                      "

## 2023-10-05 DIAGNOSIS — E03.9 HYPOTHYROIDISM, UNSPECIFIED TYPE: ICD-10-CM

## 2023-10-05 LAB
ALBUMIN SERPL BCG-MCNC: 4.5 G/DL (ref 3.5–5.2)
ALP SERPL-CCNC: 116 U/L (ref 35–104)
ALT SERPL W P-5'-P-CCNC: 15 U/L (ref 0–50)
ANION GAP SERPL CALCULATED.3IONS-SCNC: 11 MMOL/L (ref 7–15)
AST SERPL W P-5'-P-CCNC: 23 U/L (ref 0–45)
BILIRUB SERPL-MCNC: 0.3 MG/DL
BUN SERPL-MCNC: 22.9 MG/DL (ref 8–23)
CALCIUM SERPL-MCNC: 9.8 MG/DL (ref 8.8–10.2)
CHLORIDE SERPL-SCNC: 105 MMOL/L (ref 98–107)
CHOLEST SERPL-MCNC: 135 MG/DL
CREAT SERPL-MCNC: 0.8 MG/DL (ref 0.51–0.95)
CREAT UR-MCNC: 25.1 MG/DL
DEPRECATED HCO3 PLAS-SCNC: 24 MMOL/L (ref 22–29)
EGFRCR SERPLBLD CKD-EPI 2021: 78 ML/MIN/1.73M2
GLUCOSE SERPL-MCNC: 107 MG/DL (ref 70–99)
HDLC SERPL-MCNC: 41 MG/DL
LDLC SERPL CALC-MCNC: 75 MG/DL
MICROALBUMIN UR-MCNC: 16.5 MG/L
MICROALBUMIN/CREAT UR: 65.74 MG/G CR (ref 0–25)
NONHDLC SERPL-MCNC: 94 MG/DL
POTASSIUM SERPL-SCNC: 4.7 MMOL/L (ref 3.4–5.3)
PROT SERPL-MCNC: 7.7 G/DL (ref 6.4–8.3)
PTH-INTACT SERPL-MCNC: 116 PG/ML (ref 15–65)
SODIUM SERPL-SCNC: 140 MMOL/L (ref 135–145)
T4 FREE SERPL-MCNC: 1.69 NG/DL (ref 0.9–1.7)
TRIGL SERPL-MCNC: 95 MG/DL
TSH SERPL DL<=0.005 MIU/L-ACNC: 0.1 UIU/ML (ref 0.3–4.2)
VIT D+METAB SERPL-MCNC: 73 NG/ML (ref 20–50)

## 2023-10-05 RX ORDER — LEVOTHYROXINE SODIUM 125 UG/1
125 TABLET ORAL DAILY
Qty: 90 TABLET | Refills: 3 | Status: SHIPPED | OUTPATIENT
Start: 2023-10-05

## 2023-10-11 DIAGNOSIS — N32.81 OAB (OVERACTIVE BLADDER): ICD-10-CM

## 2023-10-11 RX ORDER — OXYBUTYNIN CHLORIDE 5 MG/1
5 TABLET ORAL 2 TIMES DAILY
Qty: 180 TABLET | Refills: 2 | Status: SHIPPED | OUTPATIENT
Start: 2023-10-11 | End: 2024-07-29

## 2023-10-11 NOTE — TELEPHONE ENCOUNTER
Prescription approved per [facility] Refill Protocol.     LIA [facility]

## 2023-10-14 ENCOUNTER — HEALTH MAINTENANCE LETTER (OUTPATIENT)
Age: 73
End: 2023-10-14

## 2023-10-16 ENCOUNTER — PATIENT OUTREACH (OUTPATIENT)
Dept: GASTROENTEROLOGY | Facility: CLINIC | Age: 73
End: 2023-10-16
Payer: COMMERCIAL

## 2023-10-17 ENCOUNTER — PATIENT OUTREACH (OUTPATIENT)
Dept: CARE COORDINATION | Facility: CLINIC | Age: 73
End: 2023-10-17
Payer: COMMERCIAL

## 2023-10-19 ENCOUNTER — MYC MEDICAL ADVICE (OUTPATIENT)
Dept: FAMILY MEDICINE | Facility: CLINIC | Age: 73
End: 2023-10-19
Payer: COMMERCIAL

## 2023-10-20 NOTE — TELEPHONE ENCOUNTER
Pt should be on 125mcg it looks like based on last labs.  Please call pharmacy and help verify and then coordinate with the pt.

## 2023-11-02 DIAGNOSIS — I10 ESSENTIAL HYPERTENSION: Primary | ICD-10-CM

## 2023-11-02 RX ORDER — METOPROLOL SUCCINATE 50 MG/1
50 TABLET, EXTENDED RELEASE ORAL DAILY
Qty: 90 TABLET | Refills: 3 | Status: SHIPPED | OUTPATIENT
Start: 2023-11-02

## 2023-11-02 RX ORDER — METOPROLOL SUCCINATE 25 MG/1
25 TABLET, EXTENDED RELEASE ORAL DAILY
Qty: 90 TABLET | Refills: 3 | Status: SHIPPED | OUTPATIENT
Start: 2023-11-02

## 2023-11-02 NOTE — TELEPHONE ENCOUNTER
Patient calling to get a medication refill on medication(s) attached    Patient is looking to get in ASAP     Patient is OUT

## 2023-11-14 ENCOUNTER — PATIENT OUTREACH (OUTPATIENT)
Dept: CARE COORDINATION | Facility: CLINIC | Age: 73
End: 2023-11-14
Payer: COMMERCIAL

## 2023-11-20 ENCOUNTER — TELEPHONE (OUTPATIENT)
Dept: FAMILY MEDICINE | Facility: CLINIC | Age: 73
End: 2023-11-20
Payer: COMMERCIAL

## 2023-11-20 NOTE — TELEPHONE ENCOUNTER
Attempted to contact patient for appointment. Dr. Molina will be out of office so appointment needs to rescheduled. If she calls back please assist in rescheduling to December 6th or with different provider.

## 2023-11-29 ENCOUNTER — TELEPHONE (OUTPATIENT)
Dept: FAMILY MEDICINE | Facility: CLINIC | Age: 73
End: 2023-11-29
Payer: COMMERCIAL

## 2023-11-29 NOTE — TELEPHONE ENCOUNTER
Attempted to call patient per Dr. Molina request. We would like to move appointment up earlier in the day to better align her patients back to back (in case she goes out early). Please ask patient if she is willing. Any questions. Please transfer to the clinic.

## 2024-02-25 ENCOUNTER — HEALTH MAINTENANCE LETTER (OUTPATIENT)
Age: 74
End: 2024-02-25

## 2024-03-19 DIAGNOSIS — N32.81 OAB (OVERACTIVE BLADDER): ICD-10-CM

## 2024-03-19 RX ORDER — OXYBUTYNIN CHLORIDE 5 MG/1
5 TABLET ORAL 2 TIMES DAILY
Qty: 180 TABLET | Refills: 2 | OUTPATIENT
Start: 2024-03-19

## 2024-04-05 DIAGNOSIS — N32.81 OAB (OVERACTIVE BLADDER): ICD-10-CM

## 2024-04-05 RX ORDER — OXYBUTYNIN CHLORIDE 5 MG/1
5 TABLET ORAL 2 TIMES DAILY
Qty: 180 TABLET | Refills: 2 | OUTPATIENT
Start: 2024-04-05

## 2024-05-05 ENCOUNTER — HEALTH MAINTENANCE LETTER (OUTPATIENT)
Age: 74
End: 2024-05-05

## 2024-05-14 ENCOUNTER — PATIENT OUTREACH (OUTPATIENT)
Dept: CARE COORDINATION | Facility: CLINIC | Age: 74
End: 2024-05-14
Payer: COMMERCIAL

## 2024-07-29 DIAGNOSIS — N32.81 OAB (OVERACTIVE BLADDER): ICD-10-CM

## 2024-07-29 RX ORDER — OXYBUTYNIN CHLORIDE 5 MG/1
5 TABLET ORAL 2 TIMES DAILY
Qty: 180 TABLET | Refills: 2 | Status: SHIPPED | OUTPATIENT
Start: 2024-07-29 | End: 2024-09-24

## 2024-07-29 NOTE — TELEPHONE ENCOUNTER
FAX [facility]     oxyBUTYnin (DITROPAN) 5 MG tablet       There are ZERO refills remaining on this prescription.  Your patient is requesting advance approval of refills of this medicaiton to PREVENT ANY MISSED DOSES.  Note: Prescription will be refill when due.

## 2024-08-20 DIAGNOSIS — E78.5 DYSLIPIDEMIA: ICD-10-CM

## 2024-08-20 RX ORDER — GEMFIBROZIL 600 MG/1
600 TABLET, FILM COATED ORAL 2 TIMES DAILY
Qty: 180 TABLET | Refills: 3 | Status: SHIPPED | OUTPATIENT
Start: 2024-08-20

## 2024-09-16 ENCOUNTER — MYC REFILL (OUTPATIENT)
Dept: FAMILY MEDICINE | Facility: CLINIC | Age: 74
End: 2024-09-16
Payer: COMMERCIAL

## 2024-09-16 DIAGNOSIS — I10 ESSENTIAL HYPERTENSION: ICD-10-CM

## 2024-09-16 RX ORDER — METOPROLOL SUCCINATE 25 MG/1
25 TABLET, EXTENDED RELEASE ORAL DAILY
Qty: 90 TABLET | Refills: 3 | OUTPATIENT
Start: 2024-09-16

## 2024-09-16 RX ORDER — METOPROLOL SUCCINATE 50 MG/1
50 TABLET, EXTENDED RELEASE ORAL DAILY
Qty: 90 TABLET | Refills: 3 | OUTPATIENT
Start: 2024-09-16

## 2024-09-16 NOTE — TELEPHONE ENCOUNTER
FAX [facility] Refill Request    metoprolol succinate ER (TOPROL XL) 25 MG 24 hr tablet   metoprolol succinate ER (TOPROL XL) 50 MG 24 hr tablet

## 2024-09-17 RX ORDER — AMLODIPINE BESYLATE 10 MG/1
10 TABLET ORAL DAILY
Qty: 90 TABLET | Refills: 0 | Status: SHIPPED | OUTPATIENT
Start: 2024-09-17

## 2024-09-19 ENCOUNTER — MYC MEDICAL ADVICE (OUTPATIENT)
Dept: INTERNAL MEDICINE | Facility: CLINIC | Age: 74
End: 2024-09-19
Payer: COMMERCIAL

## 2024-09-19 DIAGNOSIS — I10 ESSENTIAL HYPERTENSION: ICD-10-CM

## 2024-09-19 DIAGNOSIS — N32.81 OAB (OVERACTIVE BLADDER): ICD-10-CM

## 2024-09-19 RX ORDER — METOPROLOL SUCCINATE 50 MG/1
50 TABLET, EXTENDED RELEASE ORAL DAILY
Qty: 90 TABLET | Refills: 3 | OUTPATIENT
Start: 2024-09-19

## 2024-09-19 RX ORDER — METOPROLOL SUCCINATE 25 MG/1
25 TABLET, EXTENDED RELEASE ORAL DAILY
Qty: 90 TABLET | Refills: 3 | OUTPATIENT
Start: 2024-09-19

## 2024-09-19 NOTE — TELEPHONE ENCOUNTER
FAX [facility] Refill Authorization Request      Date Last Dispensed 07/06/24    metoprolol succinate ER (TOPROL XL) 50 MG 24 hr tablet   metoprolol succinate ER (TOPROL XL) 25 MG 24 hr tablet

## 2024-09-24 DIAGNOSIS — I10 ESSENTIAL HYPERTENSION: ICD-10-CM

## 2024-09-24 RX ORDER — LISINOPRIL AND HYDROCHLOROTHIAZIDE 20; 25 MG/1; MG/1
2 TABLET ORAL DAILY
Qty: 200 TABLET | Refills: 1 | Status: SHIPPED | OUTPATIENT
Start: 2024-09-24

## 2024-09-24 RX ORDER — OXYBUTYNIN CHLORIDE 5 MG/1
5 TABLET ORAL 2 TIMES DAILY
Qty: 180 TABLET | Refills: 1 | Status: SHIPPED | OUTPATIENT
Start: 2024-09-24

## 2024-09-24 NOTE — TELEPHONE ENCOUNTER
Patient has [facility] coverage and with this insurance plan, the patient is eligible to receive certain prescriptions as a 100-day supply at the 90-day supply cost.      Prescriptions to be updated to 100-day supply: lisinopril/hydrochlorothiazide     New prescription needed given insufficient refills remaining so pended for PCP review and signature.       Thank you!    Valarie Kilgore, PharmD, [facility]  Population Health Pharmacist  [phone]

## 2024-09-25 DIAGNOSIS — I10 ESSENTIAL HYPERTENSION: ICD-10-CM

## 2024-09-25 RX ORDER — METOPROLOL SUCCINATE 50 MG/1
50 TABLET, EXTENDED RELEASE ORAL DAILY
Qty: 90 TABLET | Refills: 3 | OUTPATIENT
Start: 2024-09-25

## 2024-09-25 RX ORDER — METOPROLOL SUCCINATE 25 MG/1
25 TABLET, EXTENDED RELEASE ORAL DAILY
Qty: 90 TABLET | Refills: 3 | OUTPATIENT
Start: 2024-09-25

## 2024-09-25 NOTE — TELEPHONE ENCOUNTER
FAX [facility] Refill Request      Disp Refills Start End DANIEL    metoprolol succinate ER (TOPROL XL) 50 MG 24 hr tablet 90 tablet 3 11/2/2023 -- No   Sig - Route: Take 1 tablet (50 mg) by mouth daily - Oral         Disp Refills Start End DANIEL    metoprolol succinate ER (TOPROL XL) 25 MG 24 hr tablet 90 tablet 3 11/2/2023 -- No   Sig - Route: Take 1 tablet (25 mg) by mouth daily - Oral

## 2024-10-04 ENCOUNTER — PATIENT OUTREACH (OUTPATIENT)
Dept: GASTROENTEROLOGY | Facility: CLINIC | Age: 74
End: 2024-10-04
Payer: COMMERCIAL

## 2024-10-04 DIAGNOSIS — Z12.11 SPECIAL SCREENING FOR MALIGNANT NEOPLASMS, COLON: Primary | ICD-10-CM

## 2024-10-04 NOTE — PROGRESS NOTES
"Patient has a history of polyps and surveillance colonoscopy is overdue. Patient meets criteria for CRC high risk standing order protocol.    CRC Screening Colonoscopy Referral Review    Patient meets the inclusion criteria for screening colonoscopy standing order.    Ordering/Referring Provider:  Jacqui Molina MD    BMI: Estimated body mass index is 40.9 kg/m  as calculated from the following:    Height as of 10/4/23: 1.657 m (5' 5.25\").    Weight as of 10/4/23: 112.4 kg (247 lb 11.2 oz).     Sedation:  Does patient have any of the following conditions affecting sedation?  No medical conditions affecting sedation.    Previous Scopes:  Any previous recommendations or follow up needs based on previous scope?  na / No recommendations.    Medical Concerns to Postpone Order:  Does patient have any of the following medical concerns that should postpone/delay colonoscopy referral?  No medical conditions affecting colonoscopy referral.    Final Referral Details:  Based on patient's medical history patient is appropriate for referral order with moderate sedation. If patient's BMI > 50 do not schedule in ASC.  "

## 2024-10-07 DIAGNOSIS — E03.9 HYPOTHYROIDISM, UNSPECIFIED TYPE: ICD-10-CM

## 2024-10-07 NOTE — TELEPHONE ENCOUNTER
Pharmacy calling to get a medication refill on medications attached    levothyroxine (SYNTHROID/LEVOTHROID) 125 MCG tablet 90 tablet 3 10/5/2023 -- No   Sig - Route: Take 1 tablet (125 mcg) by mouth daily - Oral   Sent to pharmacy as: Levothyroxine Sodium 125 MCG Oral Tablet (SYNTHROID/LEVOTHROID)   Class: E-Prescribe   Order: [id number]   E-Prescribing Status: Receipt confirmed by pharmacy (10/5/2023  8:25 AM CDT)

## 2024-10-11 ENCOUNTER — MYC MEDICAL ADVICE (OUTPATIENT)
Dept: FAMILY MEDICINE | Facility: CLINIC | Age: 74
End: 2024-10-11
Payer: COMMERCIAL

## 2024-10-14 RX ORDER — LEVOTHYROXINE SODIUM 125 UG/1
125 TABLET ORAL DAILY
Qty: 90 TABLET | Refills: 3 | Status: SHIPPED | OUTPATIENT
Start: 2024-10-14

## 2024-10-21 DIAGNOSIS — I10 ESSENTIAL HYPERTENSION: ICD-10-CM

## 2024-10-21 NOTE — TELEPHONE ENCOUNTER
Pharmacy calling to get a medication refill on medications attached    metoprolol succinate ER (TOPROL XL) 50 MG 24 hr tablet 90 tablet 3 11/2/2023 -- No   Sig - Route: Take 1 tablet (50 mg) by mouth daily - Oral   Sent to pharmacy as: Metoprolol Succinate ER 50 MG Oral Tablet Extended Release 24 Hour (TOPROL XL)   Class: E-Prescribe   Order: [id number]   E-Prescribing Status: Receipt confirmed by pharmacy (11/2/2023 12:33 PM CDT)

## 2024-10-23 RX ORDER — METOPROLOL SUCCINATE 50 MG/1
50 TABLET, EXTENDED RELEASE ORAL DAILY
Qty: 90 TABLET | Refills: 3 | OUTPATIENT
Start: 2024-10-23

## 2024-10-30 RX ORDER — METOPROLOL SUCCINATE 50 MG/1
50 TABLET, EXTENDED RELEASE ORAL DAILY
Qty: 90 TABLET | Refills: 0 | Status: SHIPPED | OUTPATIENT
Start: 2024-10-30

## 2024-11-04 DIAGNOSIS — I10 ESSENTIAL HYPERTENSION: ICD-10-CM

## 2024-11-04 RX ORDER — METOPROLOL SUCCINATE 25 MG/1
25 TABLET, EXTENDED RELEASE ORAL DAILY
Qty: 90 TABLET | Refills: 0 | Status: SHIPPED | OUTPATIENT
Start: 2024-11-04

## 2024-11-04 NOTE — TELEPHONE ENCOUNTER
FAX [facility] Refill Request    metoprolol succinate ER (TOPROL XL) 25 MG 24 hr tablet     Next Appt with PCP = 12/11/24

## 2024-12-01 ENCOUNTER — HEALTH MAINTENANCE LETTER (OUTPATIENT)
Age: 74
End: 2024-12-01

## 2024-12-09 ENCOUNTER — TRANSFERRED RECORDS (OUTPATIENT)
Dept: HEALTH INFORMATION MANAGEMENT | Facility: CLINIC | Age: 74
End: 2024-12-09
Payer: COMMERCIAL

## 2024-12-11 ENCOUNTER — OFFICE VISIT (OUTPATIENT)
Dept: FAMILY MEDICINE | Facility: CLINIC | Age: 74
End: 2024-12-11
Payer: COMMERCIAL

## 2024-12-11 VITALS
RESPIRATION RATE: 20 BRPM | DIASTOLIC BLOOD PRESSURE: 60 MMHG | OXYGEN SATURATION: 98 % | HEART RATE: 82 BPM | TEMPERATURE: 98.3 F | BODY MASS INDEX: 41.94 KG/M2 | SYSTOLIC BLOOD PRESSURE: 150 MMHG | WEIGHT: 254 LBS

## 2024-12-11 DIAGNOSIS — E21.3 HYPERPARATHYROIDISM (H): ICD-10-CM

## 2024-12-11 DIAGNOSIS — E03.9 HYPOTHYROIDISM, UNSPECIFIED TYPE: ICD-10-CM

## 2024-12-11 DIAGNOSIS — E55.9 VITAMIN D DEFICIENCY: ICD-10-CM

## 2024-12-11 DIAGNOSIS — Z98.84 HISTORY OF GASTRIC BYPASS: ICD-10-CM

## 2024-12-11 DIAGNOSIS — E78.5 DYSLIPIDEMIA: ICD-10-CM

## 2024-12-11 DIAGNOSIS — R73.03 PREDIABETES: ICD-10-CM

## 2024-12-11 DIAGNOSIS — M81.0 AGE-RELATED OSTEOPOROSIS WITHOUT CURRENT PATHOLOGICAL FRACTURE: ICD-10-CM

## 2024-12-11 DIAGNOSIS — Z23 NEED FOR TDAP VACCINATION: ICD-10-CM

## 2024-12-11 DIAGNOSIS — H93.12 TINNITUS, LEFT: ICD-10-CM

## 2024-12-11 DIAGNOSIS — I10 ESSENTIAL HYPERTENSION: ICD-10-CM

## 2024-12-11 DIAGNOSIS — N32.81 OAB (OVERACTIVE BLADDER): ICD-10-CM

## 2024-12-11 DIAGNOSIS — Z12.31 VISIT FOR SCREENING MAMMOGRAM: ICD-10-CM

## 2024-12-11 DIAGNOSIS — F41.9 ANXIETY: ICD-10-CM

## 2024-12-11 DIAGNOSIS — Z00.00 ENCOUNTER FOR MEDICARE ANNUAL WELLNESS EXAM: Primary | ICD-10-CM

## 2024-12-11 LAB
CREAT UR-MCNC: 23.7 MG/DL
EST. AVERAGE GLUCOSE BLD GHB EST-MCNC: 111 MG/DL
HBA1C MFR BLD: 5.5 % (ref 0–5.6)
MICROALBUMIN UR-MCNC: 18.4 MG/L
MICROALBUMIN/CREAT UR: 77.64 MG/G CR (ref 0–25)
PTH-INTACT SERPL-MCNC: 87 PG/ML (ref 15–65)

## 2024-12-11 PROCEDURE — 82607 VITAMIN B-12: CPT | Performed by: FAMILY MEDICINE

## 2024-12-11 PROCEDURE — 82306 VITAMIN D 25 HYDROXY: CPT | Performed by: FAMILY MEDICINE

## 2024-12-11 PROCEDURE — 80061 LIPID PANEL: CPT | Performed by: FAMILY MEDICINE

## 2024-12-11 PROCEDURE — 83970 ASSAY OF PARATHORMONE: CPT | Performed by: FAMILY MEDICINE

## 2024-12-11 PROCEDURE — 80048 BASIC METABOLIC PNL TOTAL CA: CPT | Performed by: FAMILY MEDICINE

## 2024-12-11 PROCEDURE — 99000 SPECIMEN HANDLING OFFICE-LAB: CPT | Performed by: FAMILY MEDICINE

## 2024-12-11 PROCEDURE — 36415 COLL VENOUS BLD VENIPUNCTURE: CPT | Performed by: FAMILY MEDICINE

## 2024-12-11 PROCEDURE — 83036 HEMOGLOBIN GLYCOSYLATED A1C: CPT | Performed by: FAMILY MEDICINE

## 2024-12-11 PROCEDURE — 82043 UR ALBUMIN QUANTITATIVE: CPT | Performed by: FAMILY MEDICINE

## 2024-12-11 PROCEDURE — G0439 PPPS, SUBSEQ VISIT: HCPCS | Performed by: FAMILY MEDICINE

## 2024-12-11 PROCEDURE — 99214 OFFICE O/P EST MOD 30 MIN: CPT | Mod: 25 | Performed by: FAMILY MEDICINE

## 2024-12-11 PROCEDURE — 82570 ASSAY OF URINE CREATININE: CPT | Performed by: FAMILY MEDICINE

## 2024-12-11 PROCEDURE — 84590 ASSAY OF VITAMIN A: CPT | Mod: 90 | Performed by: FAMILY MEDICINE

## 2024-12-11 PROCEDURE — 84443 ASSAY THYROID STIM HORMONE: CPT | Performed by: FAMILY MEDICINE

## 2024-12-11 PROCEDURE — 84446 ASSAY OF VITAMIN E: CPT | Mod: 90 | Performed by: FAMILY MEDICINE

## 2024-12-11 RX ORDER — LISINOPRIL AND HYDROCHLOROTHIAZIDE 20; 25 MG/1; MG/1
2 TABLET ORAL DAILY
Qty: 180 TABLET | Refills: 3 | Status: SHIPPED | OUTPATIENT
Start: 2024-12-11

## 2024-12-11 RX ORDER — GEMFIBROZIL 600 MG/1
600 TABLET, FILM COATED ORAL 2 TIMES DAILY
Qty: 180 TABLET | Refills: 3 | Status: SHIPPED | OUTPATIENT
Start: 2024-12-11

## 2024-12-11 RX ORDER — PROPRANOLOL HYDROCHLORIDE 10 MG/1
10 TABLET ORAL 3 TIMES DAILY
Qty: 90 TABLET | Refills: 1 | Status: SHIPPED | OUTPATIENT
Start: 2024-12-11

## 2024-12-11 RX ORDER — AMLODIPINE BESYLATE 10 MG/1
10 TABLET ORAL DAILY
Qty: 90 TABLET | Refills: 3 | Status: SHIPPED | OUTPATIENT
Start: 2024-12-11

## 2024-12-11 RX ORDER — METOPROLOL SUCCINATE 25 MG/1
25 TABLET, EXTENDED RELEASE ORAL DAILY
Qty: 90 TABLET | Refills: 3 | Status: SHIPPED | OUTPATIENT
Start: 2024-12-11

## 2024-12-11 RX ORDER — OXYBUTYNIN CHLORIDE 5 MG/1
5 TABLET ORAL 2 TIMES DAILY
Qty: 180 TABLET | Refills: 3 | Status: SHIPPED | OUTPATIENT
Start: 2024-12-11

## 2024-12-11 RX ORDER — METOPROLOL SUCCINATE 50 MG/1
50 TABLET, EXTENDED RELEASE ORAL DAILY
Qty: 90 TABLET | Refills: 3 | Status: SHIPPED | OUTPATIENT
Start: 2024-12-11

## 2024-12-11 SDOH — HEALTH STABILITY: PHYSICAL HEALTH: ON AVERAGE, HOW MANY DAYS PER WEEK DO YOU ENGAGE IN MODERATE TO STRENUOUS EXERCISE (LIKE A BRISK WALK)?: 0 DAYS

## 2024-12-11 SDOH — HEALTH STABILITY: PHYSICAL HEALTH: ON AVERAGE, HOW MANY MINUTES DO YOU ENGAGE IN EXERCISE AT THIS LEVEL?: 0 MIN

## 2024-12-11 ASSESSMENT — PATIENT HEALTH QUESTIONNAIRE - PHQ9
5. POOR APPETITE OR OVEREATING: NOT AT ALL
SUM OF ALL RESPONSES TO PHQ QUESTIONS 1-9: 7

## 2024-12-11 ASSESSMENT — SOCIAL DETERMINANTS OF HEALTH (SDOH): HOW OFTEN DO YOU GET TOGETHER WITH FRIENDS OR RELATIVES?: ONCE A WEEK

## 2024-12-11 ASSESSMENT — ANXIETY QUESTIONNAIRES
1. FEELING NERVOUS, ANXIOUS, OR ON EDGE: SEVERAL DAYS
8. IF YOU CHECKED OFF ANY PROBLEMS, HOW DIFFICULT HAVE THESE MADE IT FOR YOU TO DO YOUR WORK, TAKE CARE OF THINGS AT HOME, OR GET ALONG WITH OTHER PEOPLE?: SOMEWHAT DIFFICULT
2. NOT BEING ABLE TO STOP OR CONTROL WORRYING: SEVERAL DAYS
3. WORRYING TOO MUCH ABOUT DIFFERENT THINGS: SEVERAL DAYS
6. BECOMING EASILY ANNOYED OR IRRITABLE: NOT AT ALL
2. NOT BEING ABLE TO STOP OR CONTROL WORRYING: SEVERAL DAYS
7. FEELING AFRAID AS IF SOMETHING AWFUL MIGHT HAPPEN: NOT AT ALL
5. BEING SO RESTLESS THAT IT IS HARD TO SIT STILL: NOT AT ALL
4. TROUBLE RELAXING: SEVERAL DAYS
GAD7 TOTAL SCORE: 5
1. FEELING NERVOUS, ANXIOUS, OR ON EDGE: SEVERAL DAYS
7. FEELING AFRAID AS IF SOMETHING AWFUL MIGHT HAPPEN: NOT AT ALL
GAD7 TOTAL SCORE: 3
IF YOU CHECKED OFF ANY PROBLEMS ON THIS QUESTIONNAIRE, HOW DIFFICULT HAVE THESE PROBLEMS MADE IT FOR YOU TO DO YOUR WORK, TAKE CARE OF THINGS AT HOME, OR GET ALONG WITH OTHER PEOPLE: SOMEWHAT DIFFICULT
IF YOU CHECKED OFF ANY PROBLEMS ON THIS QUESTIONNAIRE, HOW DIFFICULT HAVE THESE PROBLEMS MADE IT FOR YOU TO DO YOUR WORK, TAKE CARE OF THINGS AT HOME, OR GET ALONG WITH OTHER PEOPLE: NOT DIFFICULT AT ALL
3. WORRYING TOO MUCH ABOUT DIFFERENT THINGS: SEVERAL DAYS
5. BEING SO RESTLESS THAT IT IS HARD TO SIT STILL: NOT AT ALL
GAD7 TOTAL SCORE: 5
6. BECOMING EASILY ANNOYED OR IRRITABLE: SEVERAL DAYS

## 2024-12-11 ASSESSMENT — PAIN SCALES - GENERAL: PAINLEVEL_OUTOF10: NO PAIN (0)

## 2024-12-11 NOTE — PROGRESS NOTES
Assessment & Plan     Encounter for Medicare annual wellness exam  Concerns about prolapse and will do referral for women's exam  - Ob/Gyn  Referral    Tinnitus, left  Referral and see if there are any options for her  - Adult Audiology  Referral  - Adult ENT  Referral    Age-related osteoporosis without current pathological fracture  - DEXA HIP/PELVIS/SPINE - Future    Hypothyroidism, unspecified type  - TSH WITH FREE T4 REFLEX  - US Thyroid  - TSH WITH FREE T4 REFLEX    Essential hypertension  Routine labs and will continue with medication  - BASIC METABOLIC PANEL  - metoprolol succinate ER (TOPROL XL) 50 MG 24 hr tablet  Dispense: 90 tablet; Refill: 3  - metoprolol succinate ER (TOPROL XL) 25 MG 24 hr tablet  Dispense: 90 tablet; Refill: 3  - lisinopril-hydrochlorothiazide (ZESTORETIC) 20-25 MG tablet  Dispense: 180 tablet; Refill: 3  - amLODIPine (NORVASC) 10 MG tablet  Dispense: 90 tablet; Refill: 3  - BASIC METABOLIC PANEL    Prediabetes  Labs today and will check if need medication adjustment  - Albumin Random Urine Quantitative with Creat Ratio  - Lipid panel reflex to direct LDL Non-fasting  - HEMOGLOBIN A1C  - Albumin Random Urine Quantitative with Creat Ratio  - Lipid panel reflex to direct LDL Non-fasting  - HEMOGLOBIN A1C    OAB (overactive bladder)  - stable.  Refill medication  oxyBUTYnin (DITROPAN) 5 MG tablet  Dispense: 180 tablet; Refill: 3    Dyslipidemia  Labs.  stable  - gemfibrozil (LOPID) 600 MG tablet  Dispense: 180 tablet; Refill: 3    History of gastric bypass  Routine labs.  stable  - Vitamin D Deficiency  - Vitamin B12  - Vitamin A  - Vitamin E  - Vitamin D Deficiency  - Vitamin B12  - Vitamin A  - Vitamin E    Vitamin D deficiency  - Vitamin D Deficiency  - Vitamin D Deficiency    Anxiety  Trial of inderal due to situational anxiety and see if any improvement.  Discussed heart rate and will need to closely monitor.  - propranolol (INDERAL) 10 MG tablet   Dispense: 90 tablet; Refill: 1    Hyperparathyroidism (H)  - Parathyroid Hormone Intact  - US Parathyroid  - Parathyroid Hormone Intact    Need for Tdap vaccination  - Tdap, tetanus-diptheria-acell pertussis, (BOOSTRIX) 5-2.5-18.5 LF-MCG/0.5 SOCORRO injection  Dispense: 0.5 mL; Refill: 0    Visit for screening mammogram  - MA Screening Bilateral w/ Alex      Patient has been advised of split billing requirements and indicates understanding: Yes        Counseling  Appropriate preventive services were addressed with this patient via screening, questionnaire, or discussion as appropriate for fall prevention, nutrition, physical activity, Tobacco-use cessation, social engagement, weight loss and cognition.  Checklist reviewing preventive services available has been given to the patient.  Reviewed patient's diet, addressing concerns and/or questions.   The patient was instructed to see the dentist every 6 months.   The patient was provided with written information regarding signs of hearing loss.   The patient's PHQ-9 score is consistent with mild depression. She was provided with information regarding depression.           Rayna Manzanares is a 74 year old, presenting for the following health issues:  Recheck Medication        12/11/2024     1:51 PM   Additional Questions   Roomed by KENYA     History of Present Illness       Mental Health Follow-up:  Patient presents to follow-up on Anxiety.    Patient's anxiety since last visit has been:  Worse    Any significant life events: financial concerns and health concerns  Patient is feeling anxious or having panic attacks.  Patient has no concerns about alcohol or drug use.    Reason for visit:  Recheck for meds, schedule needed tests, inquire about possible prolapse She exercises with enough effort to increase her heart rate 3 or less days per week. She is missing 1 dose(s) of medications per week.     Feels like prolapse and only think when rolling forward - at times feels  like unable to completely empty bladder.    Still ringing in the left ear.  Wondering if connection with episode at the dentist and then started losing teeth with the tinnitus getting worse     Back pain better.  Done exercises    Need dentist for teeth.  Very nervous about going and needs dentures.    Having some anxiety - would like to try a medication first and then go to therapy      Annual Wellness Visit     Patient has been advised of split billing requirements and indicates understanding: Yes          Health Care Directive  Patient does not have a Health Care Directive: Discussed advance care planning with patient; information given to patient to review.  In general, how would you rate your overall physical health? good  Do you have a special diet?  Regular (no restrictions)        2024   Exercise, Social Connection, Stress   Days per week of moderate/strenous exercise 0 days    Average minutes spent exercising at this level 0 min    Frequency of gathering with friends or relatives Once    Feel stress (tense, anxious, or unable to sleep) Not at all        Patient-reported     Do you see a dentist two times every year?  (!) NO  The patient was instructed to see the dentist every 6 months.   Have you been more tired than usual lately?  No  If you drink alcohol do you typically have >3 drinks per day or >7 drinks per week? No  Do you have a current opioid prescription? No  Do you use any other controlled substances or medications that are not prescribed by a provider? None  Social History     Tobacco Use    Smoking status: Former     Current packs/day: 0.00     Average packs/day: 1.5 packs/day for 20.0 years (30.0 ttl pk-yrs)     Types: Cigarettes     Start date: 1972     Quit date: 1992     Years since quittin.1     Passive exposure: Past    Smokeless tobacco: Never   Vaping Use    Vaping status: Never Used   Substance Use Topics    Alcohol use: Yes     Comment: Alcoholic Drinks/day: rarely     Drug use: No       Needs assistance for the following daily activities: no assistance needed  Which of the following safety concerns are present in your home?  none identified   Do you (or your family members) have any concerns about your safety while driving?  No  Do you have any of the following hearing concerns?: (!) I FEEL THAT PEOPLE ARE MUMBLING OR NOT SPEAKING CLEARLY, (!) I NEED TO ASK PEOPLE TO SPEAK UP OR REPEAT THEMSELVES, (!) IT'S HARD TO FOLLOW A CONVERSATION IN A NOISY RESTAURANT OR CROWDED ROOM, (!) TROUBLE UNDERSTANDING SOFT OR WHISPERED SPEECH, and (!) TROUBLE UNDERSTANDING SPEECH ON THE TELEPHONE  In the past 6 months, have you been bothered by leaking of urine? No        10/4/2023   Social Factors   Worry food won't last until get money to buy more No   Food not last or not have enough money for food? No   Do you have housing? (Housing is defined as stable permanent housing and does not include staying ouside in a car, in a tent, in an abandoned building, in an overnight shelter, or couch-surfing.) Yes   Are you worried about losing your housing? No   Lack of transportation? No   Unable to get utilities (heat,electricity)? No             12/11/2024   Fall Risk   Fallen 2 or more times in the past year? No     No    Trouble with walking or balance? No     No        Patient-reported    Multiple values from one day are sorted in reverse-chronological order          Today's PHQ-2 Score:       12/11/2024    11:07 AM   PHQ-2 ( 1999 [employer])   Q1: Little interest or pleasure in doing things 1    Q2: Feeling down, depressed or hopeless 1    PHQ-2 Score 2    Q1: Little interest or pleasure in doing things Several days   Q2: Feeling down, depressed or hopeless Several days   PHQ-2 Score 2       Patient-reported              ASCVD Risk   The 10-year ASCVD risk score (Destinee COLLINS, et al., 2019) is: 38%    Values used to calculate the score:      Age: 74 years      Sex: Female      Is Non-  : No      Diabetic: Yes      Tobacco smoker: No      Systolic Blood Pressure: 150 mmHg      Is BP treated: Yes      HDL Cholesterol: 41 mg/dL      Total Cholesterol: 135 mg/dL            Reviewed and updated as needed this visit by Provider                        Current providers sharing in care for this patient include:  Patient Care Team:  Jacqui Molina MD as PCP - General  Jacqui Molina MD as Assigned PCP    The following health maintenance items are reviewed in Epic and correct as of today:  Health Maintenance   Topic Date Due    DTAP/TDAP/TD IMMUNIZATION (2 - Td or Tdap) 09/29/2018    COLORECTAL CANCER SCREENING  02/06/2021    MAMMO SCREENING  11/16/2023    A1C  04/04/2024    DEXA  04/22/2024    INFLUENZA VACCINE (1) 09/01/2024    COVID-19 Vaccine (9 - 2024-25 season) 09/01/2024    BMP  10/04/2024    LIPID  10/04/2024    MICROALBUMIN  10/04/2024    TSH W/FREE T4 REFLEX  10/04/2024    ANNUAL REVIEW OF HM ORDERS  10/04/2024    EYE EXAM  12/09/2025    MEDICARE ANNUAL WELLNESS VISIT  12/11/2025    FALL RISK ASSESSMENT  12/11/2025    ADVANCE CARE PLANNING  08/10/2027    PHQ-2 (once per calendar year)  Completed    Pneumococcal Vaccine: 65+ Years  Completed    ZOSTER IMMUNIZATION  Completed    RSV VACCINE  Completed    HPV IMMUNIZATION  Aged Out    MENINGITIS IMMUNIZATION  Aged Out    RSV MONOCLONAL ANTIBODY  Aged Out    DIABETIC FOOT EXAM  Discontinued    HEPATITIS C SCREENING  Discontinued       Appropriate preventive services were discussed with this patient, including applicable screening as appropriate for fall prevention, nutrition, physical activity, Tobacco-use cessation, weight loss and cognition.  Checklist reviewing preventive services available has been given to the patient.           12/11/2024   Mini Cog   Clock Draw Score 2 Normal    2 Normal   3 Item Recall 3 objects recalled    3 objects recalled   Mini Cog Total Score 5    5       Multiple values from one day are sorted in  reverse-chronological order                Review of Systems  Constitutional, HEENT, cardiovascular, pulmonary, gi and gu systems are negative, except as otherwise noted.      Objective    BP (!) 150/60 (BP Location: Right arm, Patient Position: Sitting, Cuff Size: Adult Large)   Pulse 82   Temp 98.3  F (36.8  C) (Oral)   Resp 20   Wt 115.2 kg (254 lb)   LMP  (LMP Unknown)   SpO2 98%   BMI 41.94 kg/m    Body mass index is 41.94 kg/m .  Physical Exam   GENERAL: alert and no distress  NECK: no adenopathy, no asymmetry, masses, or scars  RESP: lungs clear to auscultation - no rales, rhonchi or wheezes  CV: regular rate and rhythm, normal S1 S2, no S3 or S4, no murmur, click or rub, no peripheral edema  ABDOMEN: soft, nontender, no hepatosplenomegaly, no masses and bowel sounds normal  MS: no gross musculoskeletal defects noted, no edema            Signed Electronically by: Jacqui Molina MD

## 2024-12-11 NOTE — PATIENT INSTRUCTIONS
Patient Education   Preventive Care Advice   This is general advice given by our system to help you stay healthy. However, your care team may have specific advice just for you. Please talk to your care team about your preventive care needs.  Nutrition  Eat 5 or more servings of fruits and vegetables each day.  Try wheat bread, brown rice and whole grain pasta (instead of white bread, rice, and pasta).  Get enough calcium and vitamin D. Check the label on foods and aim for 100% of the RDA (recommended daily allowance).  Lifestyle  Exercise at least 150 minutes each week  (30 minutes a day, 5 days a week).  Do muscle strengthening activities 2 days a week. These help control your weight and prevent disease.  No smoking.  Wear sunscreen to prevent skin cancer.  Have a dental exam and cleaning every 6 months.  Yearly exams  See your health care team every year to talk about:  Any changes in your health.  Any medicines your care team has prescribed.  Preventive care, family planning, and ways to prevent chronic diseases.  Shots (vaccines)   HPV shots (up to age 26), if you've never had them before.  Hepatitis B shots (up to age 59), if you've never had them before.  COVID-19 shot: Get this shot when it's due.  Flu shot: Get a flu shot every year.  Tetanus shot: Get a tetanus shot every 10 years.  Pneumococcal, hepatitis A, and RSV shots: Ask your care team if you need these based on your risk.  Shingles shot (for age 50 and up)  General health tests  Diabetes screening:  Starting at age 35, Get screened for diabetes at least every 3 years.  If you are younger than age 35, ask your care team if you should be screened for diabetes.  Cholesterol test: At age 39, start having a cholesterol test every 5 years, or more often if advised.  Bone density scan (DEXA): At age 50, ask your care team if you should have this scan for osteoporosis (brittle bones).  Hepatitis C: Get tested at least once in your life.  STIs (sexually  transmitted infections)  Before age 24: Ask your care team if you should be screened for STIs.  After age 24: Get screened for STIs if you're at risk. You are at risk for STIs (including HIV) if:  You are sexually active with more than one person.  You don't use condoms every time.  You or a partner was diagnosed with a sexually transmitted infection.  If you are at risk for HIV, ask about PrEP medicine to prevent HIV.  Get tested for HIV at least once in your life, whether you are at risk for HIV or not.  Cancer screening tests  Cervical cancer screening: If you have a cervix, begin getting regular cervical cancer screening tests starting at age 21.  Breast cancer scan (mammogram): If you've ever had breasts, begin having regular mammograms starting at age 40. This is a scan to check for breast cancer.  Colon cancer screening: It is important to start screening for colon cancer at age 45.  Have a colonoscopy test every 10 years (or more often if you're at risk) Or, ask your provider about stool tests like a FIT test every year or Cologuard test every 3 years.  To learn more about your testing options, visit:   .  For help making a decision, visit:   https://bit.ly/ll51251.  Prostate cancer screening test: If you have a prostate, ask your care team if a prostate cancer screening test (PSA) at age 55 is right for you.  Lung cancer screening: If you are a current or former smoker ages 50 to 80, ask your care team if ongoing lung cancer screenings are right for you.  For informational purposes only. Not to replace the advice of your health care provider. Copyright   2023 [employer]. All rights reserved. Clinically reviewed by the [facility] Transitions Program. [employer] [id number] - REV 01/24.

## 2024-12-12 ENCOUNTER — PATIENT OUTREACH (OUTPATIENT)
Dept: CARE COORDINATION | Facility: CLINIC | Age: 74
End: 2024-12-12
Payer: COMMERCIAL

## 2024-12-12 LAB
ANION GAP SERPL CALCULATED.3IONS-SCNC: 13 MMOL/L (ref 7–15)
BUN SERPL-MCNC: 17.8 MG/DL (ref 8–23)
CALCIUM SERPL-MCNC: 9.6 MG/DL (ref 8.8–10.4)
CHLORIDE SERPL-SCNC: 104 MMOL/L (ref 98–107)
CHOLEST SERPL-MCNC: 139 MG/DL
CREAT SERPL-MCNC: 0.73 MG/DL (ref 0.51–0.95)
EGFRCR SERPLBLD CKD-EPI 2021: 86 ML/MIN/1.73M2
FASTING STATUS PATIENT QL REPORTED: NO
FASTING STATUS PATIENT QL REPORTED: NO
GLUCOSE SERPL-MCNC: 104 MG/DL (ref 70–99)
HCO3 SERPL-SCNC: 24 MMOL/L (ref 22–29)
HDLC SERPL-MCNC: 45 MG/DL
LDLC SERPL CALC-MCNC: 76 MG/DL
NONHDLC SERPL-MCNC: 94 MG/DL
POTASSIUM SERPL-SCNC: 4.1 MMOL/L (ref 3.4–5.3)
SODIUM SERPL-SCNC: 141 MMOL/L (ref 135–145)
TRIGL SERPL-MCNC: 89 MG/DL
TSH SERPL DL<=0.005 MIU/L-ACNC: 3.91 UIU/ML (ref 0.3–4.2)
VIT B12 SERPL-MCNC: >4000 PG/ML (ref 232–1245)
VIT D+METAB SERPL-MCNC: 91 NG/ML (ref 20–50)

## 2024-12-12 ASSESSMENT — ANXIETY QUESTIONNAIRES: GAD7 TOTAL SCORE: 5

## 2024-12-17 LAB
A-TOCOPHEROL VIT E SERPL-MCNC: 7.4 MG/L
ANNOTATION COMMENT IMP: NORMAL
BETA+GAMMA TOCOPHEROL SERPL-MCNC: 0.2 MG/L
RETINYL PALMITATE SERPL-MCNC: <0.02 MG/L
VIT A SERPL-MCNC: 0.71 MG/L

## 2025-01-23 ENCOUNTER — ANCILLARY PROCEDURE (OUTPATIENT)
Dept: MAMMOGRAPHY | Facility: HOSPITAL | Age: 75
End: 2025-01-23
Attending: FAMILY MEDICINE
Payer: COMMERCIAL

## 2025-01-23 DIAGNOSIS — Z12.31 VISIT FOR SCREENING MAMMOGRAM: ICD-10-CM

## 2025-01-23 PROCEDURE — 77063 BREAST TOMOSYNTHESIS BI: CPT

## 2025-02-10 DIAGNOSIS — F41.9 ANXIETY: ICD-10-CM

## 2025-02-10 NOTE — TELEPHONE ENCOUNTER
FAX [facility] Prescription Refill Request        Disp Refills Start End DANIEL    propranolol (INDERAL) 10 MG tablet 90 tablet 1 12/11/2024 -- No   Sig - Route: Take 1 tablet (10 mg) by mouth 3 times daily. - Oral

## 2025-02-11 RX ORDER — PROPRANOLOL HYDROCHLORIDE 10 MG/1
10 TABLET ORAL 3 TIMES DAILY
Qty: 90 TABLET | Refills: 1 | Status: SHIPPED | OUTPATIENT
Start: 2025-02-11

## 2025-07-15 ENCOUNTER — HOSPITAL ENCOUNTER (INPATIENT)
Facility: HOSPITAL | Age: 75
LOS: 2 days | Discharge: HOME | DRG: 871 | End: 2025-07-17
Attending: EMERGENCY MEDICINE | Admitting: STUDENT IN AN ORGANIZED HEALTH CARE EDUCATION/TRAINING PROGRAM
Payer: COMMERCIAL

## 2025-07-15 ENCOUNTER — NURSE TRIAGE (OUTPATIENT)
Dept: FAMILY MEDICINE | Facility: CLINIC | Age: 75
End: 2025-07-15
Payer: COMMERCIAL

## 2025-07-15 ENCOUNTER — OFFICE VISIT (OUTPATIENT)
Dept: URGENT CARE | Facility: URGENT CARE | Age: 75
End: 2025-07-15
Payer: COMMERCIAL

## 2025-07-15 VITALS
DIASTOLIC BLOOD PRESSURE: 61 MMHG | SYSTOLIC BLOOD PRESSURE: 165 MMHG | RESPIRATION RATE: 18 BRPM | OXYGEN SATURATION: 98 % | HEART RATE: 80 BPM | TEMPERATURE: 100.1 F

## 2025-07-15 DIAGNOSIS — I10 ESSENTIAL HYPERTENSION: ICD-10-CM

## 2025-07-15 DIAGNOSIS — N39.0 URINARY TRACT INFECTION IN FEMALE: ICD-10-CM

## 2025-07-15 DIAGNOSIS — I87.8 VENOUS STASIS OF LOWER EXTREMITY: Primary | ICD-10-CM

## 2025-07-15 DIAGNOSIS — D72.829 LEUKOCYTOSIS, UNSPECIFIED TYPE: ICD-10-CM

## 2025-07-15 DIAGNOSIS — R50.9 FEVER, UNSPECIFIED FEVER CAUSE: ICD-10-CM

## 2025-07-15 DIAGNOSIS — N30.00 ACUTE CYSTITIS WITHOUT HEMATURIA: Primary | ICD-10-CM

## 2025-07-15 LAB
ALBUMIN SERPL-MCNC: 3.6 G/DL (ref 3.4–5)
ALBUMIN UR-MCNC: NEGATIVE MG/DL
ALP SERPL-CCNC: 95 U/L (ref 40–150)
ALT SERPL W P-5'-P-CCNC: 17 U/L (ref 0–50)
ANION GAP SERPL CALCULATED.3IONS-SCNC: 8 MMOL/L (ref 3–14)
APPEARANCE UR: CLEAR
AST SERPL W P-5'-P-CCNC: 21 U/L (ref 0–45)
BACTERIA #/AREA URNS HPF: ABNORMAL /HPF
BASE EXCESS BLDV CALC-SCNC: 0 MMOL/L (ref -3–3)
BASOPHILS # BLD AUTO: 0.1 10E3/UL (ref 0–0.2)
BASOPHILS # BLD AUTO: 0.1 10E3/UL (ref 0–0.2)
BASOPHILS NFR BLD AUTO: 0 %
BASOPHILS NFR BLD AUTO: 0 %
BILIRUB SERPL-MCNC: 0.7 MG/DL (ref 0.2–1.3)
BILIRUB UR QL STRIP: NEGATIVE
BUN SERPL-MCNC: 19 MG/DL (ref 7–30)
CALCIUM SERPL-MCNC: 9.3 MG/DL (ref 8.5–10.1)
CHLORIDE BLD-SCNC: 102 MMOL/L (ref 94–109)
CO2 SERPL-SCNC: 27 MMOL/L (ref 20–32)
COLOR UR AUTO: YELLOW
CREAT SERPL-MCNC: 0.9 MG/DL (ref 0.52–1.04)
EGFRCR SERPLBLD CKD-EPI 2021: 67 ML/MIN/1.73M2
EOSINOPHIL # BLD AUTO: 0 10E3/UL (ref 0–0.7)
EOSINOPHIL # BLD AUTO: 0 10E3/UL (ref 0–0.7)
EOSINOPHIL NFR BLD AUTO: 0 %
EOSINOPHIL NFR BLD AUTO: 0 %
ERYTHROCYTE [DISTWIDTH] IN BLOOD BY AUTOMATED COUNT: 13.4 % (ref 10–15)
ERYTHROCYTE [DISTWIDTH] IN BLOOD BY AUTOMATED COUNT: 13.4 % (ref 10–15)
FLUAV RNA SPEC QL NAA+PROBE: NEGATIVE
FLUBV RNA RESP QL NAA+PROBE: NEGATIVE
GLUCOSE BLD-MCNC: 121 MG/DL (ref 70–99)
GLUCOSE UR STRIP-MCNC: NEGATIVE MG/DL
HCO3 BLDV-SCNC: 25 MMOL/L (ref 21–28)
HCT VFR BLD AUTO: 35.2 % (ref 35–47)
HCT VFR BLD AUTO: 37.1 % (ref 35–47)
HGB BLD-MCNC: 11.7 G/DL (ref 11.7–15.7)
HGB BLD-MCNC: 12.1 G/DL (ref 11.7–15.7)
HGB UR QL STRIP: ABNORMAL
IMM GRANULOCYTES # BLD: 0.2 10E3/UL
IMM GRANULOCYTES # BLD: 0.3 10E3/UL
IMM GRANULOCYTES NFR BLD: 1 %
IMM GRANULOCYTES NFR BLD: 1 %
KETONES UR STRIP-MCNC: NEGATIVE MG/DL
LACTATE SERPL-SCNC: 2 MMOL/L (ref 0.7–2)
LEUKOCYTE ESTERASE UR QL STRIP: ABNORMAL
LYMPHOCYTES # BLD AUTO: 0.5 10E3/UL (ref 0.8–5.3)
LYMPHOCYTES # BLD AUTO: 0.7 10E3/UL (ref 0.8–5.3)
LYMPHOCYTES NFR BLD AUTO: 2 %
LYMPHOCYTES NFR BLD AUTO: 2 %
MCH RBC QN AUTO: 28.4 PG (ref 26.5–33)
MCH RBC QN AUTO: 28.8 PG (ref 26.5–33)
MCHC RBC AUTO-ENTMCNC: 32.6 G/DL (ref 31.5–36.5)
MCHC RBC AUTO-ENTMCNC: 33.2 G/DL (ref 31.5–36.5)
MCV RBC AUTO: 85 FL (ref 78–100)
MCV RBC AUTO: 88 FL (ref 78–100)
MONOCYTES # BLD AUTO: 1.2 10E3/UL (ref 0–1.3)
MONOCYTES # BLD AUTO: 1.2 10E3/UL (ref 0–1.3)
MONOCYTES NFR BLD AUTO: 4 %
MONOCYTES NFR BLD AUTO: 4 %
NEUTROPHILS # BLD AUTO: 25.7 10E3/UL (ref 1.6–8.3)
NEUTROPHILS # BLD AUTO: 26 10E3/UL (ref 1.6–8.3)
NEUTROPHILS NFR BLD AUTO: 92 %
NEUTROPHILS NFR BLD AUTO: 93 %
NITRATE UR QL: POSITIVE
NRBC # BLD AUTO: 0 10E3/UL
NRBC # BLD AUTO: 0 10E3/UL
NRBC BLD AUTO-RTO: 0 /100
NRBC BLD AUTO-RTO: 0 /100
O2/TOTAL GAS SETTING VFR VENT: 21 %
OXYHGB MFR BLDV: 23 % (ref 70–75)
PCO2 BLDV: 43 MM HG (ref 40–50)
PH BLDV: 7.38 [PH] (ref 7.32–7.43)
PH UR STRIP: 6 [PH] (ref 5–8)
PLATELET # BLD AUTO: 253 10E3/UL (ref 150–450)
PLATELET # BLD AUTO: 275 10E3/UL (ref 150–450)
PO2 BLDV: 17 MM HG (ref 25–47)
POTASSIUM BLD-SCNC: 4.1 MMOL/L (ref 3.4–5.3)
PROT SERPL-MCNC: 7.4 G/DL (ref 6.8–8.8)
RBC # BLD AUTO: 4.12 10E6/UL (ref 3.8–5.2)
RBC # BLD AUTO: 4.2 10E6/UL (ref 3.8–5.2)
RBC #/AREA URNS AUTO: ABNORMAL /HPF
RSV RNA SPEC NAA+PROBE: NEGATIVE
SAO2 % BLDV: 23.5 % (ref 70–75)
SARS-COV-2 RNA RESP QL NAA+PROBE: NEGATIVE
SODIUM SERPL-SCNC: 137 MMOL/L (ref 135–145)
SP GR UR STRIP: 1.01 (ref 1–1.03)
SQUAMOUS #/AREA URNS AUTO: ABNORMAL /LPF
UROBILINOGEN UR STRIP-ACNC: 0.2 E.U./DL
WBC # BLD AUTO: 27.6 10E3/UL (ref 4–11)
WBC # BLD AUTO: 28.2 10E3/UL (ref 4–11)
WBC #/AREA URNS AUTO: ABNORMAL /HPF
WBC CLUMPS #/AREA URNS HPF: PRESENT /HPF

## 2025-07-15 PROCEDURE — 87086 URINE CULTURE/COLONY COUNT: CPT | Performed by: PHYSICIAN ASSISTANT

## 2025-07-15 PROCEDURE — 87040 BLOOD CULTURE FOR BACTERIA: CPT | Performed by: PHYSICIAN ASSISTANT

## 2025-07-15 PROCEDURE — 80053 COMPREHEN METABOLIC PANEL: CPT | Performed by: PHYSICIAN ASSISTANT

## 2025-07-15 PROCEDURE — 99222 1ST HOSP IP/OBS MODERATE 55: CPT | Mod: AI | Performed by: STUDENT IN AN ORGANIZED HEALTH CARE EDUCATION/TRAINING PROGRAM

## 2025-07-15 PROCEDURE — 250N000011 HC RX IP 250 OP 636: Performed by: EMERGENCY MEDICINE

## 2025-07-15 PROCEDURE — 87154 CUL TYP ID BLD PTHGN 6+ TRGT: CPT | Mod: 59 | Performed by: PHYSICIAN ASSISTANT

## 2025-07-15 PROCEDURE — 83605 ASSAY OF LACTIC ACID: CPT | Performed by: EMERGENCY MEDICINE

## 2025-07-15 PROCEDURE — 99214 OFFICE O/P EST MOD 30 MIN: CPT | Performed by: PHYSICIAN ASSISTANT

## 2025-07-15 PROCEDURE — 87798 DETECT AGENT NOS DNA AMP: CPT | Performed by: PHYSICIAN ASSISTANT

## 2025-07-15 PROCEDURE — 36415 COLL VENOUS BLD VENIPUNCTURE: CPT | Performed by: PHYSICIAN ASSISTANT

## 2025-07-15 PROCEDURE — 85004 AUTOMATED DIFF WBC COUNT: CPT | Performed by: EMERGENCY MEDICINE

## 2025-07-15 PROCEDURE — 82805 BLOOD GASES W/O2 SATURATION: CPT | Performed by: EMERGENCY MEDICINE

## 2025-07-15 PROCEDURE — 258N000003 HC RX IP 258 OP 636: Performed by: STUDENT IN AN ORGANIZED HEALTH CARE EDUCATION/TRAINING PROGRAM

## 2025-07-15 PROCEDURE — 86618 LYME DISEASE ANTIBODY: CPT | Performed by: PHYSICIAN ASSISTANT

## 2025-07-15 PROCEDURE — 87186 SC STD MICRODIL/AGAR DIL: CPT | Performed by: PHYSICIAN ASSISTANT

## 2025-07-15 PROCEDURE — 81001 URINALYSIS AUTO W/SCOPE: CPT | Performed by: PHYSICIAN ASSISTANT

## 2025-07-15 PROCEDURE — 99285 EMERGENCY DEPT VISIT HI MDM: CPT | Mod: 25

## 2025-07-15 PROCEDURE — 96365 THER/PROPH/DIAG IV INF INIT: CPT

## 2025-07-15 PROCEDURE — 36415 COLL VENOUS BLD VENIPUNCTURE: CPT | Performed by: EMERGENCY MEDICINE

## 2025-07-15 PROCEDURE — 85025 COMPLETE CBC W/AUTO DIFF WBC: CPT | Performed by: PHYSICIAN ASSISTANT

## 2025-07-15 PROCEDURE — 87637 SARSCOV2&INF A&B&RSV AMP PRB: CPT | Performed by: PHYSICIAN ASSISTANT

## 2025-07-15 PROCEDURE — 87040 BLOOD CULTURE FOR BACTERIA: CPT | Performed by: EMERGENCY MEDICINE

## 2025-07-15 PROCEDURE — 120N000001 HC R&B MED SURG/OB

## 2025-07-15 PROCEDURE — 3077F SYST BP >= 140 MM HG: CPT | Performed by: PHYSICIAN ASSISTANT

## 2025-07-15 PROCEDURE — 87468 ANAPLSMA PHGCYTOPHLM AMP PRB: CPT | Performed by: PHYSICIAN ASSISTANT

## 2025-07-15 PROCEDURE — 3078F DIAST BP <80 MM HG: CPT | Performed by: PHYSICIAN ASSISTANT

## 2025-07-15 RX ORDER — AMOXICILLIN 250 MG
2 CAPSULE ORAL 2 TIMES DAILY PRN
Status: DISCONTINUED | OUTPATIENT
Start: 2025-07-15 | End: 2025-07-17 | Stop reason: HOSPADM

## 2025-07-15 RX ORDER — ONDANSETRON 2 MG/ML
4 INJECTION INTRAMUSCULAR; INTRAVENOUS EVERY 6 HOURS PRN
Status: DISCONTINUED | OUTPATIENT
Start: 2025-07-15 | End: 2025-07-17 | Stop reason: HOSPADM

## 2025-07-15 RX ORDER — CIPROFLOXACIN 500 MG/1
500 TABLET, FILM COATED ORAL 2 TIMES DAILY
Qty: 14 TABLET | Refills: 0 | Status: SHIPPED | OUTPATIENT
Start: 2025-07-15 | End: 2025-07-15

## 2025-07-15 RX ORDER — ACETAMINOPHEN 325 MG/1
650 TABLET ORAL ONCE
Status: COMPLETED | OUTPATIENT
Start: 2025-07-15 | End: 2025-07-15

## 2025-07-15 RX ORDER — LIDOCAINE 40 MG/G
CREAM TOPICAL
Status: DISCONTINUED | OUTPATIENT
Start: 2025-07-15 | End: 2025-07-17 | Stop reason: HOSPADM

## 2025-07-15 RX ORDER — ONDANSETRON 4 MG/1
4 TABLET, ORALLY DISINTEGRATING ORAL EVERY 6 HOURS PRN
Status: DISCONTINUED | OUTPATIENT
Start: 2025-07-15 | End: 2025-07-17 | Stop reason: HOSPADM

## 2025-07-15 RX ORDER — CEFTRIAXONE 2 G/1
2 INJECTION, POWDER, FOR SOLUTION INTRAMUSCULAR; INTRAVENOUS EVERY 24 HOURS
Status: DISCONTINUED | OUTPATIENT
Start: 2025-07-16 | End: 2025-07-17 | Stop reason: HOSPADM

## 2025-07-15 RX ORDER — CALCIUM CARBONATE 500 MG/1
1000 TABLET, CHEWABLE ORAL 4 TIMES DAILY PRN
Status: DISCONTINUED | OUTPATIENT
Start: 2025-07-15 | End: 2025-07-17 | Stop reason: HOSPADM

## 2025-07-15 RX ORDER — ACETAMINOPHEN 325 MG/1
650 TABLET ORAL EVERY 4 HOURS PRN
Status: DISCONTINUED | OUTPATIENT
Start: 2025-07-15 | End: 2025-07-17 | Stop reason: HOSPADM

## 2025-07-15 RX ORDER — ACETAMINOPHEN 650 MG/1
650 SUPPOSITORY RECTAL EVERY 4 HOURS PRN
Status: DISCONTINUED | OUTPATIENT
Start: 2025-07-15 | End: 2025-07-17 | Stop reason: HOSPADM

## 2025-07-15 RX ORDER — POLYETHYLENE GLYCOL 3350 17 G/17G
17 POWDER, FOR SOLUTION ORAL 2 TIMES DAILY PRN
Status: DISCONTINUED | OUTPATIENT
Start: 2025-07-15 | End: 2025-07-17 | Stop reason: HOSPADM

## 2025-07-15 RX ORDER — AMOXICILLIN 250 MG
1 CAPSULE ORAL 2 TIMES DAILY PRN
Status: DISCONTINUED | OUTPATIENT
Start: 2025-07-15 | End: 2025-07-17 | Stop reason: HOSPADM

## 2025-07-15 RX ORDER — CIPROFLOXACIN 500 MG/1
500 TABLET, FILM COATED ORAL 2 TIMES DAILY
Qty: 14 TABLET | Refills: 0 | Status: ON HOLD | OUTPATIENT
Start: 2025-07-15 | End: 2025-07-17

## 2025-07-15 RX ORDER — CEFTRIAXONE 2 G/1
2 INJECTION, POWDER, FOR SOLUTION INTRAMUSCULAR; INTRAVENOUS ONCE
Status: COMPLETED | OUTPATIENT
Start: 2025-07-15 | End: 2025-07-15

## 2025-07-15 RX ADMIN — CEFTRIAXONE SODIUM 2 G: 2 INJECTION, POWDER, FOR SOLUTION INTRAMUSCULAR; INTRAVENOUS at 21:58

## 2025-07-15 RX ADMIN — SODIUM CHLORIDE 1848 ML: 0.9 INJECTION, SOLUTION INTRAVENOUS at 22:30

## 2025-07-15 ASSESSMENT — ENCOUNTER SYMPTOMS
SORE THROAT: 0
FEVER: 1
COUGH: 0
FATIGUE: 1
CHILLS: 1
CONFUSION: 1

## 2025-07-15 ASSESSMENT — COLUMBIA-SUICIDE SEVERITY RATING SCALE - C-SSRS
6. HAVE YOU EVER DONE ANYTHING, STARTED TO DO ANYTHING, OR PREPARED TO DO ANYTHING TO END YOUR LIFE?: NO
1. IN THE PAST MONTH, HAVE YOU WISHED YOU WERE DEAD OR WISHED YOU COULD GO TO SLEEP AND NOT WAKE UP?: NO
2. HAVE YOU ACTUALLY HAD ANY THOUGHTS OF KILLING YOURSELF IN THE PAST MONTH?: NO

## 2025-07-15 ASSESSMENT — ACTIVITIES OF DAILY LIVING (ADL)
ADLS_ACUITY_SCORE: 41

## 2025-07-15 NOTE — Clinical Note
"John Alatorre,  I just wanted to send you an FYI. I suspect that Glenna has Urosepsis, but was \"too exhausted\" to go to the ED. Fever 103, WBC 28 on prelim, mild confusion, mild hypotension. She left after signing AMA, but hopefully she goes to the ED. Hoping someone may be able to follow up with her on how she's doing; and wanted to loop her care team in. Blood cultures and Urine Cultures are pending. Cipro sent in for outpatient treatment. Her daughter Eleni was with her for the visit.  -Sonia Geller PA-C "

## 2025-07-15 NOTE — ED TRIAGE NOTES
"Pt here from  [address] [address] d/t concerns for urosepsis. Febrile to 100.6. All other VSS. Last tylenol was 1g at 1000. Labs and cultures drawn at clinic. Wound on right leg \"is oozing\".  PMH: Gastric bypass, lymphedema, DVT     Triage Assessment (Adult)       Row Name 07/15/25 0958          Triage Assessment    Airway WDL WDL        Respiratory WDL    Respiratory WDL WDL        Skin Circulation/Temperature WDL    Skin Circulation/Temperature WDL WDL        Cognitive/Neuro/Behavioral WDL    Cognitive/Neuro/Behavioral WDL X  minimally confused                     "

## 2025-07-15 NOTE — PROGRESS NOTES
"Urgent Care Clinic Visit    Chief Complaint   Patient presents with    Fever     AT 12PM temp was 103, pt called daughter that she was feeling off,  been feeling warm, feeling \"like weather is getting to pt\"               7/15/2025     3:06 PM   Additional Questions   Roomed by Glenna PAYNE   Accompanied by Daughter     Glenna Zacarias on 7/15/2025 at 3:08 PM          "

## 2025-07-15 NOTE — ED NOTES
Expected Patient Referral to ED  5:10 PM    Referring Clinic/Provider:  [facility]    Reason for referral/Clinical facts:  Chills, lives independently, called daughter to come over, 103 fever. Daughter thought maybe a little confused. Chronic leg wounds that she refused to show clinic. Refused other things initially. Urinalysis showed UTI. More confused after visit, signed out AMA because she wouldn't come in to clinic.  No hx of memory disorder.   1 blood culture done.  No antibiotics IV. Cipro prescribed if she doesn't show up.    Recommendations provided:  Send to ED for further evaluation    Caller was informed that this institution does possess the capabilities and/or resources to provide for patient and should be transferred to our facility.    Discussed that if direct admit is sought and any hurdles are encountered, this ED would be happy to see the patient and evaluate.    Informed caller that recommendations provided are recommendations based only on the facts provided and that they responsible to accept or reject the advice, or to seek a formal in person consultation as needed and that this ED will see/treat patient should they arrive.      ELISE ENGLAND MD  [facility] EMERGENCY DEPARTMENT  [address] [place] [id number]  [phone]       Elise England MD  07/15/25 7133

## 2025-07-15 NOTE — TELEPHONE ENCOUNTER
"Chills    Didn't feel right going out to the store    Went home    Thermometer in celsius 39.6C = 103.2    /72, HR 80 \"she does tend to run high\"    Nurse Triage SBAR    Is this a 2nd Level Triage? NO    Situation: Fever 103.2F    Background: Hx HTN, not immunocompromised    Assessment: Pt calling with daughter about fever. States she woke up with \"chills\" and \"just felt very cold.\" Went to the grocery and wasn't feeling right so came home, daughter took her temp and it was 103.2F. Pt took BP while on phone and it was 171/72, daughter states \"she tends to run high.\" Denies cough, SOB, sore throat, earache, headache, diarrhea, pain with urination, abdominal pain, rash. Does not have cash catheter or central line. States she has pain in her neck but that this is a chronic issue for her and is not new. Drinking well, has had at least 30 oz of fluids today.     Protocol Recommended Disposition:   Go To Office Now    Recommendation: Advised pt needs to be seen today based on high fever in combination with her age. Offered appointment later this afternoon at [facility], but if they would prefer to be seen earlier recommended UC. Pt and daughter states they would rather just be seen as soon as possible and will go to [facility] UC.      Does the patient meet one of the following criteria for ADS visit consideration? 16+ years old, with an [facility] PCP     TIP  Providers, please consider if this condition is appropriate for management at one of our Acute and Diagnostic Services sites.     If patient is a good candidate, please use dotphrase <dot>triageresponse and select Refer to ADS to document.    Reason for Disposition   Fever > 101 F (38.3 C) and over 60 years of age    Additional Information   Negative: Difficult to awaken or acting confused (e.g., disoriented, slurred speech)   Negative: Pale cold skin and very weak (can't stand)   Negative: Difficulty breathing and bluish (or gray) lips or face   Negative: " "New-onset rash with purple (or blood-colored) spots or dots   Negative: Sounds like a life-threatening emergency to the triager   Negative: Taking antibiotic for infection (follow-up call)   Negative: Pregnant   Negative: Postpartum (from 0 to 6 weeks after delivery)   Negative: Fever onset within 24 hours of receiving vaccine   Negative: Fever within 14 days of COVID-19 Exposure   Negative: Follow-up call after recent diagnosis or treatment by a doctor (or NP/PA)   Negative: Other symptom is present, use that symptom guideline first (e.g., Diarrhea, Sore Throat).   Negative: Headache and stiff neck (can't touch chin to chest)   Negative: Difficulty breathing   Negative: IV Drug Use (IVDU)   Negative: Fever > 104 F (40 C)   Negative: Fever > 100 F (37.8 C) and indwelling urinary catheter (e.g., Lam, coude)   Negative: Fever > 100 F (37.8 C) and has port (portacath), central line, or PICC line   Negative: Drinking very little and dehydration suspected (e.g., no urine > 12 hours, very dry mouth, very lightheaded)   Negative: Patient sounds very sick or weak to the triager    Answer Assessment - Initial Assessment Questions  1. TEMPERATURE: \"What is the most recent temperature?\"  \"How was it measured?\"       39.6C = 103.2F  2. ONSET: \"When did the fever start?\"       This morning  3. CHILLS: \"Do you have chills?\" If yes: \"How bad are they?\"  (e.g., none, mild, moderate, severe)      Has chills, not physically shaking, \"I was just cold\"  4. OTHER SYMPTOMS: \"Do you have any other symptoms besides the fever?\"  (e.g., abdomen pain, cough, diarrhea, earache, headache, sore throat, urination pain)      States she has had intermittent pain with urination that has been going on for a while, but is not having it today  5. CAUSE: If there are no symptoms, ask: \"What do you think is causing the fever?\"       *No Answer*  6. CONTACTS: \"Does anyone else in the family have an infection?\"      Not that she knows of  7. TREATMENT: " "\"What have you done so far to treat this fever?\" (e.g., OTC fever medicines)      Taking tylenol around the clock  8. IMMUNOCOMPROMISE: \"Do you have of the following: diabetes, HIV positive, splenectomy, cancer chemotherapy, chronic steroid treatment, transplant patient, etc.?\"       Denies  10. TRAVEL: \"Have you traveled out of the country in the last month?\" (e.g., travel history, exposures)        Denies    Protocols used: Fever-A-OH    "

## 2025-07-15 NOTE — PROGRESS NOTES
"Patient presents with:  Fever: AT 12PM temp was 103, pt called daughter that she was feeling off,  been feeling warm, feeling \"like weather is getting to pt\"      Clinical Decision Making:  Initial recommendation was to seek Emergency medical attention due to my concern for sepsis.  Patient initially hesitant and not wanting to go to the ER due to her severity of fatigue.  I did make it clear what sepsis was and why it is important to diagnose and treat, and I did take multiple times of explanation for the patient to understand.  She is still not wanting to go to the ER despite understanding the condition.  I did have the patient's sign an AMA and then recommended that we at least start a workup here in clinic.  Patient was also hesitant to do any labs including urine, blood, or any swabs.  She would prefer to go home without any workup or treatment.  After some bartering patient agreed upon getting a urine sample, and doing a blood draw, and a nasal swab.  Part of the deal was that she would be allowed to leave right after the drawl was collected and I would call the patient's daughter with the results.  Patient's UA strongly indicative of infection today.  Her complete blood count was preliminarily flagged for abnormality with a initial WBC of 28.09.  I did call patient's daughter and she was still with the patient and I reiterated that it would be best to be seen in the emergency department for the concern of urosepsis.  Patient's confusion seem to be worsening when I overheard her speaking on the phone.  Again, I suggested that they go to the ER.  Eleni, the patient's daughter plans to inform the rest of the family of the findings and suggestion.  In the meantime, I did call the emergency department to give report about the patient's condition and suspected diagnosis.      ICD-10-CM    1. Acute cystitis without hematuria  N30.00 ciprofloxacin (CIPRO) 500 MG tablet     DISCONTINUED: ciprofloxacin (CIPRO) 500 MG " "tablet      2. Fever, unspecified fever cause  R50.9 UA Macroscopic with reflex to Microscopic and Culture - Clinic Collect     LYME DISEASE TOTAL ANTIBODIES WITH REFLEX TO CONFIRMATION     CBC with platelets and differential     Tick-Borne Disease Panel (Non-Lyme) by PCR     Blood Culture Peripheral blood (BC)     Comprehensive metabolic panel (BMP + Alb, Alk Phos, ALT, AST, Total. Bili, TP)     Influenza A/B, RSV and SARS-CoV2 PCR (COVID-19)     Influenza A/B, RSV and SARS-CoV2 PCR (COVID-19) Nasopharyngeal     LYME DISEASE TOTAL ANTIBODIES WITH REFLEX TO CONFIRMATION     CBC with platelets and differential     Tick-Borne Disease Panel (Non-Lyme) by PCR     Blood Culture Peripheral blood (BC) Arm, Right     Comprehensive metabolic panel (BMP + Alb, Alk Phos, ALT, AST, Total. Bili, TP)     Urine Microscopic Exam     Urine Culture     CANCELED: Comprehensive metabolic panel (BMP + Alb, Alk Phos, ALT, AST, Total. Bili, TP)          There are no Patient Instructions on file for this visit.    HPI:  Glenna Estrada is a 74 year old female who presents today with concerns of fever and confusion. Patient woke up this morning with some chills. She also feels weak. Patient feels a little confused. She thinks it might have to do with the warmer weather and poor air quality. Patient doesn't \"feel horrible, but doesn't feel good\". Patient's daughter says the patient called her this morning that she wasn't feeling well. They had a harder time than usual getting the patient out of the car. Patient's daughter took her temperature around noon and it was around 103 degrees F. Daughter is concerned for infections as patient has a long hx of lymphedema and is prone to wounds. She used to have someone help her with her wounds, who retired and now patient is taking care of her own wounds.     History obtained from the patient.    Problem List:  2023-10: Prediabetes  2022-08: Dyslipidemia  2020-02: Abnormal colonoscopy  2020-02: Benign " neoplasm of ascending colon  2020: Benign neoplasm of descending colon  2020: Benign neoplasm of sigmoid colon  2020: Benign neoplasm of cecum  2020: Polyp of colon  2019: Thiamine deficiency  2018: OAB (overactive bladder)  2016: H/O colonoscopy recommend follow up 2016: Osteoporosis  2016: Hyperparathyroidism  2015: Scar condition and fibrosis of skin  2015: Acquired lymphedema of leg  2015: Onychomycosis of toenail  2014: S/P total hip arthroplasty  2014: Venous stasis of lower extremity  2014: Ulcer of calf (H)  2014: Morbid obesity (H)  Essential hypertension  Hypothyroidism  Obesity  Anemia  Osteoarthrosis  Nontropical (Celiac) Sprue  History of DVT (deep vein thrombosis)  Vitamin D deficiency  Obesity, Class III, BMI 40-49.9 (morbid obesity) (H)  History of gastric bypass  Lymphedema  Type 2 Diabetes Mellitus With Complication  Hypertension  Diabetes mellitus (H)      Past Medical History:   Diagnosis Date    Anemia     Arthritis     Diabetes mellitus (H)     Disease of thyroid gland     Dyslipidemia     History of blood clots     DVT    History of blood transfusion reaction     Rash    History of transfusion     Hyperparathyroidism     Lymphedema     Metabolic syndrome     Morbid obesity with BMI of 50.0-59.9, adult (H)     Other and unspecified postsurgical nonabsorption        Social History     Tobacco Use    Smoking status: Former     Current packs/day: 0.00     Average packs/day: 1.5 packs/day for 20.0 years (30.0 ttl pk-yrs)     Types: Cigarettes     Start date: 1972     Quit date: 1992     Years since quittin.7     Passive exposure: Past    Smokeless tobacco: Never   Substance Use Topics    Alcohol use: Yes     Comment: Alcoholic Drinks/day: rarely         Review of Systems   Constitutional:  Positive for chills, fatigue and fever.        Overall weakness and confusion   HENT:  Negative for congestion and sore throat.    Respiratory:   Negative for cough.    Psychiatric/Behavioral:  Positive for confusion.        Vitals:    07/15/25 1508   BP: (!) 165/61   Pulse: 80   Resp: 18   Temp: 100.1  F (37.8  C)   TempSrc: Oral   SpO2: 98%       Physical Exam  Vitals and nursing note reviewed.   Constitutional:       General: She is not in acute distress.     Appearance: She is not toxic-appearing or diaphoretic.      Comments: Patient is currently seated in the chair.  This is not her norm; she is a wheelchair secondary to fatigue. NAD   HENT:      Head: Normocephalic and atraumatic.   Eyes:      Conjunctiva/sclera: Conjunctivae normal.   Cardiovascular:      Rate and Rhythm: Normal rate and regular rhythm.   Pulmonary:      Effort: Pulmonary effort is normal.   Skin:     Comments: Wearing ANDREI stocking. Refuses to remove them to show her chronic wounds because she is too tired.    Neurological:      Mental Status: She is alert.   Psychiatric:         Mood and Affect: Mood normal.         Speech: Speech is delayed and tangential.         Behavior: Behavior normal.         Thought Content: Thought content normal.         Cognition and Memory: Cognition is impaired (Trouble with word finding. Forgetting why she is talking. She is oriented to person and place.). She exhibits impaired recent memory.         Judgment: Judgment normal.         Results:  Results for orders placed or performed in visit on 07/15/25   UA Macroscopic with reflex to Microscopic and Culture - Clinic Collect     Status: Abnormal    Specimen: Urine, Clean Catch   Result Value Ref Range    Color Urine Yellow Colorless, Straw, Light Yellow, Yellow    Appearance Urine Clear Clear    Glucose Urine Negative Negative mg/dL    Bilirubin Urine Negative Negative    Ketones Urine Negative Negative mg/dL    Specific Gravity Urine 1.010 1.005 - 1.030    Blood Urine Large (A) Negative    pH Urine 6.0 5.0 - 8.0    Protein Albumin Urine Negative Negative mg/dL    Urobilinogen Urine 0.2 0.2, 1.0  E.U./dL    Nitrite Urine Positive (A) Negative    Leukocyte Esterase Urine Moderate (A) Negative   Comprehensive metabolic panel (BMP + Alb, Alk Phos, ALT, AST, Total. Bili, TP)     Status: Abnormal   Result Value Ref Range    Sodium 137 135 - 145 mmol/L    Potassium 4.1 3.4 - 5.3 mmol/L    Chloride 102 94 - 109 mmol/L    Carbon Dioxide (CO2) 27 20 - 32 mmol/L    Anion Gap 8 3 - 14 mmol/L    Urea Nitrogen 19 7 - 30 mg/dL    Creatinine 0.90 0.52 - 1.04 mg/dL    GFR Estimate 67 >60 mL/min/1.73m2    Calcium 9.3 8.5 - 10.1 mg/dL    Glucose 121 (H) 70 - 99 mg/dL    Alkaline Phosphatase 95 40 - 150 U/L    AST 21 0 - 45 U/L    ALT 17 0 - 50 U/L    Protein Total 7.4 6.8 - 8.8 g/dL    Albumin 3.6 3.4 - 5.0 g/dL    Bilirubin Total 0.7 0.2 - 1.3 mg/dL   Urine Microscopic Exam     Status: Abnormal   Result Value Ref Range    Bacteria Urine Moderate (A) None Seen /HPF    RBC Urine 5-10 (A) 0-2 /HPF /HPF    WBC Urine  (A) 0-5 /HPF /HPF    Squamous Epithelials Urine Few (A) None Seen /LPF    WBC Clumps Urine Present (A) None Seen /HPF   CBC with platelets and differential     Status: None (In process)    Narrative    The following orders were created for panel order CBC with platelets and differential.  Procedure                               Abnormality         Status                     ---------                               -----------         ------                     CBC with platelets and ...[[id number]]                      In process                   Please view results for these tests on the individual orders.         At the end of the encounter, I discussed results, diagnosis, medications. Discussed red flags for immediate return to clinic/ER, as well as indications for follow up if no improvement. Patient understood and agreed to plan. Patient was stable for discharge.

## 2025-07-16 ENCOUNTER — APPOINTMENT (OUTPATIENT)
Dept: CT IMAGING | Facility: HOSPITAL | Age: 75
DRG: 871 | End: 2025-07-16
Attending: FAMILY MEDICINE
Payer: COMMERCIAL

## 2025-07-16 ENCOUNTER — TELEPHONE (OUTPATIENT)
Dept: FAMILY MEDICINE | Facility: CLINIC | Age: 75
End: 2025-07-16

## 2025-07-16 ENCOUNTER — APPOINTMENT (OUTPATIENT)
Dept: OCCUPATIONAL THERAPY | Facility: HOSPITAL | Age: 75
DRG: 871 | End: 2025-07-16
Attending: FAMILY MEDICINE
Payer: COMMERCIAL

## 2025-07-16 LAB
A PHAGOCYTOPH DNA BLD QL NAA+PROBE: NOT DETECTED
ACB COMPLEX DNA BLD POS QL NAA+NON-PROBE: NOT DETECTED
ANION GAP SERPL CALCULATED.3IONS-SCNC: 10 MMOL/L (ref 7–15)
B BURGDOR IGG+IGM SER QL: 0.1
B FRAGILIS DNA BLD POS QL NAA+NON-PROBE: NOT DETECTED
BABESIA DNA BLD QL NAA+PROBE: NOT DETECTED
BUN SERPL-MCNC: 26 MG/DL (ref 8–23)
C ALBICANS DNA BLD POS QL NAA+NON-PROBE: NOT DETECTED
C AURIS DNA BLD POS QL NAA+NON-PROBE: NOT DETECTED
C GATTII+NEOFOR DNA BLD POS QL NAA+N-PRB: NOT DETECTED
C GLABRATA DNA BLD POS QL NAA+NON-PROBE: NOT DETECTED
C KRUSEI DNA BLD POS QL NAA+NON-PROBE: NOT DETECTED
C PARAP DNA BLD POS QL NAA+NON-PROBE: NOT DETECTED
C TROPICLS DNA BLD POS QL NAA+NON-PROBE: NOT DETECTED
CALCIUM SERPL-MCNC: 9.2 MG/DL (ref 8.8–10.4)
CHLORIDE SERPL-SCNC: 102 MMOL/L (ref 98–107)
CREAT SERPL-MCNC: 0.97 MG/DL (ref 0.51–0.95)
E CLOAC COMP DNA BLD POS NAA+NON-PROBE: NOT DETECTED
E COLI DNA BLD POS QL NAA+NON-PROBE: NOT DETECTED
E FAECALIS DNA BLD POS QL NAA+NON-PROBE: NOT DETECTED
E FAECIUM DNA BLD POS QL NAA+NON-PROBE: NOT DETECTED
EGFRCR SERPLBLD CKD-EPI 2021: 61 ML/MIN/1.73M2
EHRLICHIA DNA SPEC QL NAA+PROBE: NOT DETECTED
ENTEROBACTERALES DNA BLD POS NAA+N-PRB: NOT DETECTED
ERYTHROCYTE [DISTWIDTH] IN BLOOD BY AUTOMATED COUNT: 13.7 % (ref 10–15)
GLUCOSE SERPL-MCNC: 109 MG/DL (ref 70–99)
GP B STREP DNA BLD POS QL NAA+NON-PROBE: NOT DETECTED
HAEM INFLU DNA BLD POS QL NAA+NON-PROBE: NOT DETECTED
HCO3 SERPL-SCNC: 24 MMOL/L (ref 22–29)
HCT VFR BLD AUTO: 32.2 % (ref 35–47)
HGB BLD-MCNC: 10.4 G/DL (ref 11.7–15.7)
K OXYTOCA DNA BLD POS QL NAA+NON-PROBE: NOT DETECTED
KLEBSIELLA SP DNA BLD POS QL NAA+NON-PRB: NOT DETECTED
KLEBSIELLA SP DNA BLD POS QL NAA+NON-PRB: NOT DETECTED
L MONOCYTOG DNA BLD POS QL NAA+NON-PROBE: NOT DETECTED
MAGNESIUM SERPL-MCNC: 1.7 MG/DL (ref 1.7–2.3)
MCH RBC QN AUTO: 28 PG (ref 26.5–33)
MCHC RBC AUTO-ENTMCNC: 32.3 G/DL (ref 31.5–36.5)
MCV RBC AUTO: 87 FL (ref 78–100)
N MEN DNA BLD POS QL NAA+NON-PROBE: NOT DETECTED
P AERUGINOSA DNA BLD POS NAA+NON-PROBE: NOT DETECTED
PHOSPHATE SERPL-MCNC: 2.8 MG/DL (ref 2.5–4.5)
PLATELET # BLD AUTO: 231 10E3/UL (ref 150–450)
POTASSIUM SERPL-SCNC: 3.9 MMOL/L (ref 3.4–5.3)
PROTEUS SP DNA BLD POS QL NAA+NON-PROBE: NOT DETECTED
RBC # BLD AUTO: 3.72 10E6/UL (ref 3.8–5.2)
S AUREUS DNA BLD POS QL NAA+NON-PROBE: NOT DETECTED
S AUREUS+CONS DNA BLD POS NAA+NON-PROBE: NOT DETECTED
S EPIDERMIDIS DNA BLD POS QL NAA+NON-PRB: NOT DETECTED
S LUGDUNENSIS DNA BLD POS QL NAA+NON-PRB: NOT DETECTED
S MALTOPHILIA DNA BLD POS QL NAA+NON-PRB: NOT DETECTED
S MARCESCENS DNA BLD POS NAA+NON-PROBE: NOT DETECTED
S PNEUM DNA BLD POS QL NAA+NON-PROBE: NOT DETECTED
S PYO DNA BLD POS QL NAA+NON-PROBE: NOT DETECTED
SALMONELLA DNA BLD POS QL NAA+NON-PROBE: NOT DETECTED
SODIUM SERPL-SCNC: 136 MMOL/L (ref 135–145)
STREPTOCOCCUS DNA BLD POS NAA+NON-PROBE: NOT DETECTED
TSH SERPL DL<=0.005 MIU/L-ACNC: 1.62 UIU/ML (ref 0.3–4.2)
VIT B12 SERPL-MCNC: 728 PG/ML (ref 232–1245)
WBC # BLD AUTO: 19.8 10E3/UL (ref 4–11)

## 2025-07-16 PROCEDURE — 250N000011 HC RX IP 250 OP 636: Performed by: STUDENT IN AN ORGANIZED HEALTH CARE EDUCATION/TRAINING PROGRAM

## 2025-07-16 PROCEDURE — 250N000013 HC RX MED GY IP 250 OP 250 PS 637: Performed by: STUDENT IN AN ORGANIZED HEALTH CARE EDUCATION/TRAINING PROGRAM

## 2025-07-16 PROCEDURE — 83735 ASSAY OF MAGNESIUM: CPT | Performed by: STUDENT IN AN ORGANIZED HEALTH CARE EDUCATION/TRAINING PROGRAM

## 2025-07-16 PROCEDURE — 74177 CT ABD & PELVIS W/CONTRAST: CPT

## 2025-07-16 PROCEDURE — 97165 OT EVAL LOW COMPLEX 30 MIN: CPT | Mod: GO

## 2025-07-16 PROCEDURE — 120N000001 HC R&B MED SURG/OB

## 2025-07-16 PROCEDURE — 80048 BASIC METABOLIC PNL TOTAL CA: CPT | Performed by: STUDENT IN AN ORGANIZED HEALTH CARE EDUCATION/TRAINING PROGRAM

## 2025-07-16 PROCEDURE — 99232 SBSQ HOSP IP/OBS MODERATE 35: CPT | Performed by: FAMILY MEDICINE

## 2025-07-16 PROCEDURE — 97535 SELF CARE MNGMENT TRAINING: CPT | Mod: GO

## 2025-07-16 PROCEDURE — 87205 SMEAR GRAM STAIN: CPT | Performed by: FAMILY MEDICINE

## 2025-07-16 PROCEDURE — 82607 VITAMIN B-12: CPT | Performed by: STUDENT IN AN ORGANIZED HEALTH CARE EDUCATION/TRAINING PROGRAM

## 2025-07-16 PROCEDURE — 250N000013 HC RX MED GY IP 250 OP 250 PS 637: Performed by: FAMILY MEDICINE

## 2025-07-16 PROCEDURE — 85018 HEMOGLOBIN: CPT | Performed by: STUDENT IN AN ORGANIZED HEALTH CARE EDUCATION/TRAINING PROGRAM

## 2025-07-16 PROCEDURE — 84443 ASSAY THYROID STIM HORMONE: CPT | Performed by: STUDENT IN AN ORGANIZED HEALTH CARE EDUCATION/TRAINING PROGRAM

## 2025-07-16 PROCEDURE — 84100 ASSAY OF PHOSPHORUS: CPT | Performed by: STUDENT IN AN ORGANIZED HEALTH CARE EDUCATION/TRAINING PROGRAM

## 2025-07-16 PROCEDURE — G0463 HOSPITAL OUTPT CLINIC VISIT: HCPCS

## 2025-07-16 PROCEDURE — 36415 COLL VENOUS BLD VENIPUNCTURE: CPT | Performed by: STUDENT IN AN ORGANIZED HEALTH CARE EDUCATION/TRAINING PROGRAM

## 2025-07-16 PROCEDURE — 250N000011 HC RX IP 250 OP 636: Performed by: FAMILY MEDICINE

## 2025-07-16 RX ORDER — IOPAMIDOL 755 MG/ML
90 INJECTION, SOLUTION INTRAVASCULAR ONCE
Status: COMPLETED | OUTPATIENT
Start: 2025-07-16 | End: 2025-07-16

## 2025-07-16 RX ORDER — PROPRANOLOL HYDROCHLORIDE 10 MG/1
10 TABLET ORAL 3 TIMES DAILY
Status: DISCONTINUED | OUTPATIENT
Start: 2025-07-16 | End: 2025-07-17 | Stop reason: HOSPADM

## 2025-07-16 RX ORDER — ENOXAPARIN SODIUM 100 MG/ML
40 INJECTION SUBCUTANEOUS EVERY 12 HOURS
Status: DISCONTINUED | OUTPATIENT
Start: 2025-07-16 | End: 2025-07-17 | Stop reason: HOSPADM

## 2025-07-16 RX ORDER — MULTIPLE VITAMINS W/ MINERALS TAB 9MG-400MCG
1 TAB ORAL 2 TIMES DAILY
Status: DISCONTINUED | OUTPATIENT
Start: 2025-07-16 | End: 2025-07-17 | Stop reason: HOSPADM

## 2025-07-16 RX ORDER — OXYBUTYNIN CHLORIDE 5 MG/1
5 TABLET ORAL 2 TIMES DAILY
Status: DISCONTINUED | OUTPATIENT
Start: 2025-07-16 | End: 2025-07-17 | Stop reason: HOSPADM

## 2025-07-16 RX ORDER — GEMFIBROZIL 600 MG/1
600 TABLET, FILM COATED ORAL 2 TIMES DAILY
Status: DISCONTINUED | OUTPATIENT
Start: 2025-07-16 | End: 2025-07-17 | Stop reason: HOSPADM

## 2025-07-16 RX ORDER — ASPIRIN 81 MG/1
81 TABLET ORAL DAILY
Status: DISCONTINUED | OUTPATIENT
Start: 2025-07-16 | End: 2025-07-17 | Stop reason: HOSPADM

## 2025-07-16 RX ORDER — METOPROLOL SUCCINATE 50 MG/1
50 TABLET, EXTENDED RELEASE ORAL DAILY
Status: DISCONTINUED | OUTPATIENT
Start: 2025-07-16 | End: 2025-07-16

## 2025-07-16 RX ORDER — LISINOPRIL AND HYDROCHLOROTHIAZIDE 20; 25 MG/1; MG/1
2 TABLET ORAL DAILY
Status: DISCONTINUED | OUTPATIENT
Start: 2025-07-16 | End: 2025-07-17 | Stop reason: HOSPADM

## 2025-07-16 RX ORDER — AMLODIPINE BESYLATE 5 MG/1
10 TABLET ORAL DAILY
Status: DISCONTINUED | OUTPATIENT
Start: 2025-07-16 | End: 2025-07-17 | Stop reason: HOSPADM

## 2025-07-16 RX ORDER — FOLIC ACID 0.4 MG
800 TABLET ORAL DAILY
Status: DISCONTINUED | OUTPATIENT
Start: 2025-07-16 | End: 2025-07-17 | Stop reason: HOSPADM

## 2025-07-16 RX ADMIN — METOPROLOL SUCCINATE 75 MG: 25 TABLET, FILM COATED, EXTENDED RELEASE ORAL at 17:34

## 2025-07-16 RX ADMIN — THIAMINE HCL TAB 100 MG 100 MG: 100 TAB at 07:57

## 2025-07-16 RX ADMIN — ASPIRIN 81 MG: 81 TABLET, COATED ORAL at 07:57

## 2025-07-16 RX ADMIN — CEFTRIAXONE SODIUM 2 G: 2 INJECTION, POWDER, FOR SOLUTION INTRAMUSCULAR; INTRAVENOUS at 21:20

## 2025-07-16 RX ADMIN — IOPAMIDOL 90 ML: 755 INJECTION, SOLUTION INTRAVENOUS at 14:50

## 2025-07-16 RX ADMIN — PROPRANOLOL HYDROCHLORIDE 10 MG: 10 TABLET ORAL at 18:15

## 2025-07-16 RX ADMIN — ENOXAPARIN SODIUM 40 MG: 40 INJECTION SUBCUTANEOUS at 21:21

## 2025-07-16 RX ADMIN — LEVOTHYROXINE SODIUM 125 MCG: 0.1 TABLET ORAL at 07:58

## 2025-07-16 RX ADMIN — Medication 1 TABLET: at 07:57

## 2025-07-16 RX ADMIN — AMLODIPINE BESYLATE 10 MG: 5 TABLET ORAL at 07:57

## 2025-07-16 RX ADMIN — PROPRANOLOL HYDROCHLORIDE 10 MG: 10 TABLET ORAL at 07:59

## 2025-07-16 RX ADMIN — Medication 800 MCG: at 07:58

## 2025-07-16 RX ADMIN — GEMFIBROZIL 600 MG: 600 TABLET ORAL at 07:57

## 2025-07-16 RX ADMIN — ENOXAPARIN SODIUM 40 MG: 40 INJECTION SUBCUTANEOUS at 07:59

## 2025-07-16 RX ADMIN — Medication 1 TABLET: at 21:20

## 2025-07-16 RX ADMIN — GEMFIBROZIL 600 MG: 600 TABLET ORAL at 21:27

## 2025-07-16 ASSESSMENT — ACTIVITIES OF DAILY LIVING (ADL)
ADLS_ACUITY_SCORE: 55
ADLS_ACUITY_SCORE: 40
ADLS_ACUITY_SCORE: 55
ADLS_ACUITY_SCORE: 57
ADLS_ACUITY_SCORE: 57
ADLS_ACUITY_SCORE: 55
ADLS_ACUITY_SCORE: 41
ADLS_ACUITY_SCORE: 55
ADLS_ACUITY_SCORE: 57
ADLS_ACUITY_SCORE: 40
ADLS_ACUITY_SCORE: 57
ADLS_ACUITY_SCORE: 41
ADLS_ACUITY_SCORE: 55
ADLS_ACUITY_SCORE: 40
ADLS_ACUITY_SCORE: 40
ADLS_ACUITY_SCORE: 55
ADLS_ACUITY_SCORE: 57
ADLS_ACUITY_SCORE: 55
ADLS_ACUITY_SCORE: 57
ADLS_ACUITY_SCORE: 57
ADLS_ACUITY_SCORE: 55
ADLS_ACUITY_SCORE: 57
ADLS_ACUITY_SCORE: 40

## 2025-07-16 NOTE — ED NOTES
Report given to LIA Stanford. Pt and family at bedside updated with plan of care, verbalized understanding.

## 2025-07-16 NOTE — TELEPHONE ENCOUNTER
Talked with pt and adv that dr chase out on 10/8/25 and need to r/s appointment. Able to get appointment r/s.   unknown

## 2025-07-16 NOTE — CONSULTS
[facility]  WO Nurse Inpatient Assessment     Consulted for: bilateral extremities below the knees    Summary: 7/16 Patient and family member in room.  Patient has chronic wounds which she states started with a DVT in her twenties.  Does wound care independently.  States she has had at least three different skin grafts each time, new material tried.    WO nurse follow-up plan: weekly    Patient History (according to provider note(s):      Glenna Estrada is a 74 year old female with a past medical history of HTN, HLD, Hypothyroidism who was admitted on 7/15/25 after presenting to [facility] ED for Acute Encephalopathy and found to have UTI..     Assessment:      Skin Injury Location: BLE         7/16                                                                 LLE                                                                 RLE vega                                                         RLE posterior  Last photo: 7/16  Skin injury due to: Unclear etiology and Venous stasis dermatitis  Skin history and plan of care:   see above, feet and heels assessed appear healthy, no visible injuries.  RLE has evidence of some healed tissue, LLE mostly intact with superficial scabbing, dry skin. Uses compression socks and tries to elevate legs  Affected area:      Skin assessment: Intact, Denudement, Dry/flaky, Edema, Erosion of epidermis, Erythema, and Hemosiderin staining     Measurements (length x width x depth, in cm) right leg circumferential, see photos.  Left leg small superficial wound 2 cm x 1 cm     Color: pink and red     Temperature  normal      Drainage: none .      Color: none      Odor: none  Pain: denies ,   Pain interventions prior to dressing change: slow and gentle cares   Treatment goal: Heal , Increase moisture , and Promote epidermal migration  STATUS: initial assessment  Supplies ordered: ordered Xeroform, Vashe and supplies stored on unit       Treatment Plan:     BLE wound(s):  Monday/Wednesday/Friday and PRN  Moisten gauze with Vashe, wring out excess.  Place against wounds and let soak for 5-10 minutes, pat dry.  Left leg apply Sween Cream from knee to top of foot.  Right leg- cover wound on the calf and on shin with full piece of Xeroform to each.  Then ABD pads.  Secure with Kerlix.       Orders: Written    RECOMMEND PRIMARY TEAM ORDER: Lymphedema consult  Education provided: plan of care  Discussed plan of care with: Patient and Family  Notify [facility] if wound(s) deteriorate.  Nursing to notify the Provider(s) and re-consult the [facility] Nurse if new skin concern.    DATA:     Current support surface: Standard  ED cart  Containment of urine/stool: Continent of bowel and Suction based external urinary catheter   BMI: Body mass index is 41.82 kg/m .   Active diet order: Orders Placed This Encounter      Regular Diet Adult     Output: I/O last 3 completed shifts:  In: 100 [IV Piggyback:100]  Out: -      Labs:   Recent Labs   Lab 07/16/25  0822 07/15/25  2119 07/15/25  1626   ALBUMIN  --   --  3.6   HGB 10.4*   < > 12.1   WBC 19.8*   < > 27.6*    < > = values in this interval not displayed.     Pressure injury risk assessment:                          ALEX MitchellN RN CWOCN  Pager no longer in use, please contact through [employer] [facility]: [facility]

## 2025-07-16 NOTE — H&P
[facility]    History and Physical - Hospitalist Service       Date of Admission:  7/15/2025    Assessment & Plan      Glenna Estrada is a 74 year old female with a past medical history of HTN, HLD, Hypothyroidism who was admitted on 7/15/25 after presenting to [facility] ED for Acute Encephalopathy and found to have UTI..    #Sepsis 2/2 Complicated UTI  - In the ED/UC visit on 7/15 - WBC 28.2, fever 100.6F, HR 90s, LA 2.0  - UA consistent with UTI  Plan  - Given IV fluid bolus 30mL/kg on admission  - Continue Ceftriaxone 2g daily started in ED  - Follow UC, BC, Tick Panel from ED  - Holding home oxybutynin    #Bilateral Lower Extremities Wounds  #Lower Extremities Venous Stasis  - On and off ulcerations for many years.   - Has wounds on bilateral LE, minimal drainage, some erythema, overall patient does not feel they are worse than usual  Plan  - Ceftriaxone as above  - Wound care consult  - No crepitus on exam, hold off on CT of LE    #Acute Metabolic Encephalopathy - Improving  - Family worried about altered mental status earlier in day 7/15.  - Likely related to UTI  Plan  - Checking TSH, B12  - Hold off on head CT for now, can pursue if does not improve.  - PT/OT consulted.    #HTN - Holding PTA lisinopril, hydrochlorothiazide, metoprolol for now with acute infection. Can continue PTA amlodipine.  #Hypothyroid - continue PTA levothyroxine  #HLD - Continue PTA gemfibrozil  #Anxiety - Can continue PTA propranolol          Diet: Regular Diet Adult    DVT Prophylaxis: Enoxaparin (Lovenox) SQ and Pneumatic Compression Devices  Lam Catheter: Not present  Lines: None     Cardiac Monitoring: None  Code Status: Full Code      Clinically Significant Risk Factors Present on Admission                 # Drug Induced Platelet Defect: home medication list includes an antiplatelet medication   # Hypertension: Noted on problem list           # Morbid Obesity: Estimated body mass index is 41.82 kg/m  as  "calculated from the following:    Height as of this encounter: 1.702 m (5' 7\").    Weight as of this encounter: 121.1 kg (267 lb).              Disposition Plan     Medically Ready for Discharge: Anticipated in 2-4 Days           Jayden Stoll MD  Hospitalist Service  [facility]  Securely message with [employer] (more info)  Text page via [employer] Paging/Directory     ______________________________________________________________________    Chief Complaint   Altered Mental Status, Chills, Diaphoresis    History is obtained from the patient, electronic health record, emergency department physician, and patient's daughter    History of Present Illness   Glenna Estrada is a 74 year old female with a past medical history of HTN, HLD, Hypothyroidism who was admitted on 7/15/25 after presenting to [facility] ED for Acute Encephalopathy and found to have UTI.    Patient reporting intermittent dysuria over the past 1-2 weeks.  Earlier during the day on 7/15 she developed chills, diaphoresis.  Her daughter came to visit her around 12 PM and and was concerned about altered mental status.  Patient denies any nausea, vomiting, abdominal pain.  No hematuria.      Past Medical History    Past Medical History:   Diagnosis Date    Anemia     Arthritis     Diabetes mellitus (H)     Disease of thyroid gland     Dyslipidemia     History of blood clots     DVT    History of blood transfusion reaction     Rash    History of transfusion     Hyperparathyroidism     Lymphedema     Metabolic syndrome     Morbid obesity with BMI of 50.0-59.9, adult (H)     Other and unspecified postsurgical nonabsorption        Past Surgical History   Past Surgical History:   Procedure Laterality Date    ABDOMEN SURGERY      ARTHROSCOPY SHOULDER ROTATOR CUFF REPAIR      JOINT REPLACEMENT Left     ESTEFANÍA    VT LAP GASTRIC BYPASS/CIERRA-EN-Y N/A 6/9/2014    Procedure: LAPAROSCOPIC CIERRA-EN-Y GASTRIC BYPASS WITH OMENTOPEXY, POSSIBLE OPEN (Room 4710);  " Surgeon: Timbo Sweet MD;  Location: [facility];  Service: General    TONSILLECTOMY & ADENOIDECTOMY      TOTAL HIP ARTHROPLASTY Left 12/31/2014    Procedure: LEFT TOTAL HIP ARTHROPLASTY;  Surgeon: Landon Canales MD;  Location: [facility];  Service:     TUBAL LIGATION Bilateral     VASCULAR SURGERY  June 2014    Right leg skin graft       Prior to Admission Medications   Prior to Admission Medications   Prescriptions Last Dose Informant Patient Reported? Taking?   Biotin 5000 MCG TABS   Yes No   Sig: Take 5,000 mcg/day by mouth daily   Lactobacillus rhamnosus GG (CULTURELLE) 10-15 Billion cell capsule   Yes No   Sig: [LACTOBACILLUS RHAMNOSUS GG (CULTURELLE) 10-15 BILLION CELL CAPSULE] Take 1 capsule by mouth daily.   acetaminophen (TYLENOL) 650 MG CR tablet   Yes No   Sig: Take by mouth every 12 hours   amLODIPine (NORVASC) 10 MG tablet   No No   Sig: Take 1 tablet (10 mg) by mouth daily.   aspirin 81 MG EC tablet   Yes No   Sig: [ASPIRIN 81 MG EC TABLET] Take 81 mg by mouth daily.   calcium citrate-vitamin D (CITRACAL+D) 315-200 mg-unit per tablet   Yes No   Sig: [CALCIUM CITRATE-VITAMIN D (CITRACAL+D) 315-200 MG-UNIT PER TABLET] Take 1 tablet by mouth 2 (two) times a day.   cholecalciferol, vitamin D3, 5,000 unit Tab   Yes No   Sig: [CHOLECALCIFEROL, VITAMIN D3, 5,000 UNIT TAB] Take 1 tablet by mouth daily.   ciprofloxacin (CIPRO) 500 MG tablet   No No   Sig: Take 1 tablet (500 mg) by mouth 2 times daily.   folic acid (FOLVITE) 800 MCG tablet   Yes No   Sig: [FOLIC ACID (FOLVITE) 800 MCG TABLET] Take 800 mcg by mouth daily.    gemfibrozil (LOPID) 600 MG tablet   No No   Sig: Take 1 tablet (600 mg) by mouth 2 times daily.   glucosamine-chondroitin 500-400 mg tablet   Yes No   Sig: [GLUCOSAMINE-CHONDROITIN 500-400 MG TABLET] Take 1 tablet by mouth daily.   levothyroxine (SYNTHROID/LEVOTHROID) 125 MCG tablet   No No   Sig: Take 1 tablet (125 mcg) by mouth daily.    lisinopril-hydrochlorothiazide (ZESTORETIC) 20-25 MG tablet   No No   Sig: Take 2 tablets by mouth daily.   metoprolol succinate ER (TOPROL XL) 25 MG 24 hr tablet   No No   Sig: Take 1 tablet (25 mg) by mouth daily.   metoprolol succinate ER (TOPROL XL) 50 MG 24 hr tablet   No No   Sig: Take 1 tablet (50 mg) by mouth daily.   multivitamin with minerals tablet   Yes No   Sig: [MULTIVITAMIN WITH MINERALS TABLET] Take 1 tablet by mouth 2 (two) times a day.   oxyBUTYnin (DITROPAN) 5 MG tablet   No No   Sig: Take 1 tablet (5 mg) by mouth 2 times daily.   propranolol (INDERAL) 10 MG tablet   No No   Sig: Take 1 tablet (10 mg) by mouth 3 times daily.   thiamine (VITAMIN B-1) 50 MG tablet   Yes No   Sig: [THIAMINE (VITAMIN B-1) 50 MG TABLET] Take 100 mg by mouth daily.   vit A,C & E-lutein-minerals (OCUVITE WITH LUTEIN) 1,000 unit-200 mg-60 unit-2 mg Tab   Yes No   Sig: [VIT A,C & E-LUTEIN-MINERALS (OCUVITE WITH LUTEIN) 1,000 UNIT-200 MG-60 UNIT-2 MG TAB] Take 1 tablet by mouth daily.      Facility-Administered Medications: None        Social History   I have reviewed this patient's social history and updated it with pertinent information if needed.  Social History     Tobacco Use    Smoking status: Former     Current packs/day: 0.00     Average packs/day: 1.5 packs/day for 20.0 years (30.0 ttl pk-yrs)     Types: Cigarettes     Start date: 1972     Quit date: 1992     Years since quittin.7     Passive exposure: Past    Smokeless tobacco: Never   Vaping Use    Vaping status: Never Used   Substance Use Topics    Alcohol use: Yes     Comment: Alcoholic Drinks/day: rarely    Drug use: No         Family History   I have reviewed this patient's family history and updated it with pertinent information if needed.  Family History   Problem Relation Age of Onset    Breast Cancer Mother     Heart Disease Father     Early Death Father     Pancreatic Cancer Sister     Heart Disease Brother     Heart Disease Paternal  Grandfather          Allergies   Allergies   Allergen Reactions    Penicillins Hives    Latex Rash     Pt states she can tolerate short-term    Silver [Silver Protein] Rash        Physical Exam   Vital Signs: Temp: 100.6  F (38.1  C) Temp src: Oral BP: 139/63 Pulse: 73   Resp: 18 SpO2: 99 % O2 Device: None (Room air)    Weight: 267 lbs 0 oz    General: Alert and Oriented x3. Answers questions appropriately. Follows commands.  Resp: Breath sounds equal throughout. No crackles. No wheezing.  Cardio: Regular rate and rhythm. No murmurs. No friction rub.  Abd: Soft. Non-distended. Non-tender. Bowel sounds normal. No rebound tenderness.  MSK:   - Erythema and well healed wounds on bilateral lower extremities.  Neuro: CN 2-12 grossly intact. Strength 5/5 in all 4 extremities.  Skin:No rashes. No jaundice.       Medical Decision Making       60 MINUTES SPENT BY ME on the date of service doing chart review, history, exam, documentation & further activities per the note.  MANAGEMENT DISCUSSED with the following over the past 24 hours: RN, ED Physician, Family   Tests ORDERED & REVIEWED in the past 24 hours:  - BMP  - CBC  - Lactic Acid  - VBG  - Urinalysis  Medical complexity over the past 24 hours:  - Prescription DRUG MANAGEMENT performed      Data     I have personally reviewed the following data over the past 24 hrs:    28.2 (H)  \   11.7   / 253     137 102 19 /  121 (H)   4.1 27 0.90 \     ALT: 17 AST: 21 AP: 95 TBILI: 0.7   ALB: 3.6 TOT PROTEIN: 7.4 LIPASE: N/A     Procal: N/A CRP: N/A Lactic Acid: 2.0         Imaging results reviewed over the past 24 hrs:   No results found for this or any previous visit (from the past 24 hours).

## 2025-07-16 NOTE — PROGRESS NOTES
07/16/25 1445   Appointment Info   Signing Clinician's Name / Credentials (OT) Paige Frommeljose OTD OTR/L   Rehab Comments (OT) Lymphedema eval   Quick Adds   Quick Adds Edema   General Information   Onset of Illness/Injury or Date of Surgery 07/15/25   Referring Physician Mary Martinez MD   Patient/Family Therapy Goal Statement (OT) To return home   Additional Occupational Profile Info/Pertinent History of Current Problem Glenna Estrada is a 74 year old female with a past medical history of HTN, HLD, Hypothyroidism who was admitted on 7/15/25 after presenting to [facility] ED for eval fever and Acute Encephalopathy and found to have UTI.   Cognitive Status Examination   Orientation Status orientation to person, place and time   Edema General Information   Onset of Edema 07/15/25   Affected Body Part(s) Left LE;Right LE   Edema Etiology Insidious;Unknown   Edema Precautions Acute infection   General Comments/Previous Edema Treatment/Edema Equipment Reports wearing compression socks daily - manages own wound cares   Edema Examination/Assessment   Skin Condition Pitting;Dryness   Pitting Assessment 1+ shins   Edema Assessment Comments WOC completed wound cares on RLE/shin prior to session   Clinical Impression   Criteria for Skilled Therapeutic Interventions Met (OT) Yes, treatment indicated   Edema: Patient Presentation Stage 1 Lymphedema   Assessment of Occupational Performance 1-3 Performance Deficits   Edema: Planned Interventions Education;ADL training;Edema exercises;Fit for compression garment;Precautions to prevent infection/exacerbation;Gradient compression bandaging   Clinical Decision Making Complexity (OT) problem focused assessment/low complexity   Risk & Benefits of therapy have been explained evaluation/treatment results reviewed;care plan/treatment goals reviewed;risks/benefits reviewed;current/potential barriers reviewed;patient;daughter   OT Total Evaluation Time   OT Eval, Low Complexity Minutes (17672) 5    OT Goals   Therapy Frequency (OT) Daily   OT Predicted Duration/Target Date for Goal Attainment 07/23/25   OT Goals Edema   OT: Edema education to increase ability to manage edema after discharge from the hospital Patient;Verbalize;Supervision/Stand-by assist;garrnet/bandage care;wear schedule   OT: Management of edema bandages Patient;Supervision/Stand-by assist;quick wrap   OT: Functional edema exercise program to reduce limb volume, increase activity tolerance and improve independence with ADL Patient;Verbalize;HEP   Self-Care/Home Management   Self-Care/Home Mgmt/ADL, Compensatory, Meal Prep Minutes (84111) 14   Symptoms Noted During/After Treatment (Meal Preparation/Planning Training) none   Treatment Detail/Skilled Intervention Eval completed, treatment initiated. Wound cares/washing completed prior to eval - TG soft base layer applied, SSB 50% overlap herringbone technique, med compression. Reports comfort - instructed on s/s for removal   OT Discharge Planning   OT Plan Lymph: reassess, rewrap as needed   OT Discharge Recommendation (DC Rec) home   OT Rationale for DC Rec Pt able to manage compression at home, may benefit from follow up wound cares   Total Session Time   Timed Code Treatment Minutes 14   Total Session Time (sum of timed and untimed services) 19

## 2025-07-16 NOTE — PROGRESS NOTES
[facility]    Medicine Progress Note - Hospitalist Service    Date of Admission:  7/15/2025    Assessment & Plan      Glenna Estrada is a 74 year old female with a past medical history of HTN, HLD, Hypothyroidism who was admitted on 7/15/25 after presenting to [facility] ED for eval fever and Acute Encephalopathy and found to have UTI.    #Sepsis 2/2 Complicated UTI  Bacteremia -gram-negative bacilli  - resolved  ---- WBC 28.2, fever 100.6F, HR 90s, LA 2.0  -Status post IV fluid bolus 30mL/kg on admission  - Continue Ceftriaxone 2g daily started in ED  - Follow UC, BC,   -- Negative tick Panel from ED  - Holding home oxybutynin  --- Check CT to rule out hydronephrosis/retention as risk factor  --- Postvoid residuals to be checked    #Acute Toxic Encephalopathy -   --resolved  - Likely related to UTI/sepsis  -normal TSH, B12  -treat infection  - PT/OT consulted.    #Bilateral Lower Extremities Wounds  #Lower Extremities Venous Stasis  - On and off ulcerations for many years.   --- Examined but does not look like source of sepsis  - Has wounds on bilateral LE, minimal drainage, some erythema, overall patient does not feel they are worse than usual  --cultured right leg  - Ceftriaxone as above  - Wound care consult  --- Lymphedema therapy    #HTN -  --bp trending up  -- Holding PTA lisinopril, hydrochlorothiazide,   ---resume metoprolol  --- continue PTA amlodipine.    #Hypothyroid - continue PTA levothyroxine  #HLD - Continue PTA gemfibrozil  #Anxiety - Can continue PTA propranolol          Diet: Regular Diet Adult    DVT Prophylaxis: Enoxaparin (Lovenox) SQ  Lam Catheter: Not present  Lines: None     Cardiac Monitoring: None  Code Status: Full Code      Clinically Significant Risk Factors Present on Admission                 # Drug Induced Platelet Defect: home medication list includes an antiplatelet medication   # Hypertension: Noted on problem list      # Anemia: based on hgb <11       # Morbid  "Obesity: Estimated body mass index is 41.82 kg/m  as calculated from the following:    Height as of this encounter: 1.702 m (5' 7\").    Weight as of this encounter: 121.1 kg (267 lb).              Social Drivers of Health    Tobacco Use: Medium Risk (7/15/2025)    Patient History     Smoking Tobacco Use: Former     Smokeless Tobacco Use: Never     Passive Exposure: Past   Physical Activity: Inactive (12/11/2024)    Exercise Vital Sign     Days of Exercise per Week: 0 days     Minutes of Exercise per Session: 0 min   Social Connections: Unknown (12/11/2024)    Social Connection and Isolation Panel [NHANES]     Frequency of Social Gatherings with Friends and Family: Once a week          Disposition Plan     Medically Ready for Discharge: Anticipated Tomorrow             Mary Martinez MD  Hospitalist Service  [facility]  Securely message with [employer] (more info)  Text page via [employer] Paging/Directory   ______________________________________________________________________    Interval History   --- Awake and alert today.  --- Admits to longstanding issues involving venous stasis and ulcers that have previously required wound VAC and skin grafting more on her right than her left.  She does her own dressing changes when she needs them.  --- Denies preceding dysuria increased frequency or hematuria    Physical Exam   Vital Signs: Temp: 98.6  F (37  C) Temp src: Oral BP: (!) 150/67 Pulse: 68   Resp: 18 SpO2: 93 % O2 Device: None (Room air)    Weight: 267 lbs 0 oz    General Appearance: Pleasant female in no apparent distress.  Respiratory: Clear to auscultation  Cardiovascular: Regular rate and rhythm  GI: Obese soft and nontender without hepatosplenomegaly  Skin: She has significant stasis edema bilaterally with some stasis dermatitis present right greater than left.  Scattered superficial open areas on the right leg with 1 with some mild purulence cultured.  Other: Neurologically she is grossly intact. "  No focal deficit appreciated.    Medical Decision Making             Data     I have personally reviewed the following data over the past 24 hrs:    19.8 (H)  \   10.4 (L)   / 231     136 102 26.0 (H) /  109 (H)   3.9 24 0.97 (H) \     ALT: 17 AST: 21 AP: 95 TBILI: 0.7   ALB: 3.6 TOT PROTEIN: 7.4 LIPASE: N/A     TSH: 1.62 T4: N/A A1C: N/A     Procal: N/A CRP: N/A Lactic Acid: 2.0         Imaging results reviewed over the past 24 hrs:   No results found for this or any previous visit (from the past 24 hours).

## 2025-07-16 NOTE — PHARMACY-ADMISSION MEDICATION HISTORY
Pharmacist Admission Medication History    Admission medication history is complete. The information provided in this note is only as accurate as the sources available at the time of the update.    Information Source(s): Patient and Family member via in-person    Pertinent Information: Patient has not picked up new cipro RX. Patient takes metoprolol 25 mg tablet with 50 mg tablet for total dose of 75 mg.    Changes made to PTA medication list:  Added: None  Deleted: cholecalciferol  Changed: None    Allergies reviewed with patient and updates made in EHR: yes    Medication History Completed By: Kayla Dickerson 7/15/2025 10:57 PM    PTA Med List   Medication Sig Last Dose/Taking    acetaminophen (TYLENOL) 650 MG CR tablet Take by mouth every 12 hours 7/15/2025    amLODIPine (NORVASC) 10 MG tablet Take 1 tablet (10 mg) by mouth daily. 7/15/2025    aspirin 81 MG EC tablet [ASPIRIN 81 MG EC TABLET] Take 81 mg by mouth daily. 7/15/2025    Biotin 5000 MCG TABS Take 5,000 mcg/day by mouth daily 7/15/2025    calcium citrate-vitamin D (CITRACAL+D) 315-200 mg-unit per tablet [CALCIUM CITRATE-VITAMIN D (CITRACAL+D) 315-200 MG-UNIT PER TABLET] Take 1 tablet by mouth 2 (two) times a day. 7/15/2025    folic acid (FOLVITE) 800 MCG tablet [FOLIC ACID (FOLVITE) 800 MCG TABLET] Take 800 mcg by mouth daily.  7/15/2025    gemfibrozil (LOPID) 600 MG tablet Take 1 tablet (600 mg) by mouth 2 times daily. 7/15/2025    glucosamine-chondroitin 500-400 mg tablet [GLUCOSAMINE-CHONDROITIN 500-400 MG TABLET] Take 1 tablet by mouth daily. 7/15/2025    Lactobacillus rhamnosus GG (CULTURELLE) 10-15 Billion cell capsule [LACTOBACILLUS RHAMNOSUS GG (CULTURELLE) 10-15 BILLION CELL CAPSULE] Take 1 capsule by mouth daily. 7/15/2025    levothyroxine (SYNTHROID/LEVOTHROID) 125 MCG tablet Take 1 tablet (125 mcg) by mouth daily. 7/15/2025    lisinopril-hydrochlorothiazide (ZESTORETIC) 20-25 MG tablet Take 2 tablets by mouth daily. 7/15/2025    metoprolol succinate  ER (TOPROL XL) 25 MG 24 hr tablet Take 1 tablet (25 mg) by mouth daily. (Patient taking differently: Take 25 mg by mouth daily. Take with 50 mg tablet for a total of 75 mg) 7/15/2025    metoprolol succinate ER (TOPROL XL) 50 MG 24 hr tablet Take 1 tablet (50 mg) by mouth daily. (Patient taking differently: Take 50 mg by mouth daily. Take with 25 mg tablet for a total of 75 mg) 7/15/2025    multivitamin with minerals tablet [MULTIVITAMIN WITH MINERALS TABLET] Take 1 tablet by mouth 2 (two) times a day. 7/15/2025    oxyBUTYnin (DITROPAN) 5 MG tablet Take 1 tablet (5 mg) by mouth 2 times daily. 7/15/2025    propranolol (INDERAL) 10 MG tablet Take 1 tablet (10 mg) by mouth 3 times daily. 7/15/2025    thiamine (VITAMIN B-1) 50 MG tablet [THIAMINE (VITAMIN B-1) 50 MG TABLET] Take 100 mg by mouth daily. 7/15/2025    vit A,C & E-lutein-minerals (OCUVITE WITH LUTEIN) 1,000 unit-200 mg-60 unit-2 mg Tab [VIT A,C & E-LUTEIN-MINERALS (OCUVITE WITH LUTEIN) 1,000 UNIT-200 MG-60 UNIT-2 MG TAB] Take 1 tablet by mouth daily. 7/15/2025

## 2025-07-16 NOTE — DISCHARGE INSTRUCTIONS
WOC DISCHARGE INSTRUCTIONS:  BLE wound(s): Monday/Wednesday/Friday and PRN  Moisten gauze with Vashe, wring out excess.  Place against wounds and let soak for 5-10 minutes, pat dry.  Left leg apply Sween Cream from knee to top of foot.  Right leg- cover wound on the calf and on shin with full piece of Xeroform to each.  Then ABD pads.  Secure with Kerlix.

## 2025-07-16 NOTE — ED PROVIDER NOTES
"EMERGENCY DEPARTMENT ENCOUNTER      NAME: Glenna Estrada  AGE: 74 year old female  YOB: 1950  MRN: [id number]  EVALUATION DATE & TIME: No admission date for patient encounter.    PCP: Jacqui Molina    ED PROVIDER: Elise Lancaster M.D.      Chief Complaint   Patient presents with    UTI       FINAL IMPRESSION:  1. Urinary tract infection in female    2. Leukocytosis, unspecified type        ED COURSE & MEDICAL DECISION MAKIN. UTI, confusion.  Febrile on arrival, prior labs from  reviewed, UTI present on UA. 2nd blood culture ordered.  Confusion improved on my exam, \"normal\" self per daughter.  Significant weakness present, requiring 2 person assist to get into bed from wheelchair.  Ceftriaxone X 1 ordered.  Labs ordered and reviewed, stable with exception of WBC elevation to 28k. Normal lactic acid.  Admitted to medicine.    8:00 PM I met with the patient to gather history and to perform my initial exam. I discussed the plan for care while in the Emergency Department.  9:51 PM offered admission at [facility] which she refused, she would rather stay at [facility]. Patient has her night time dose of prescribed medication with her to take.  9:54 PM Paged hospitalist.  10:08 PM Dr. Moise returned page.      Pertinent Labs & Imaging studies reviewed. (See chart for details).      Medical Decision Making  I obtained history from patient and daughter, I reviewed the EMR as documented in HPI, ED course, and chart review section above and below, Care impacted by chronic conditions/past medical history as documented in HPI, ED course, and chart review section above below, and I discussed the care with another health care provider: Dr. Moise \[facility]\"" medicine    Admit.    MIPS (CTPE, Dental pain, Lam, Sinusitis, Asthma/COPD, Head Trauma): Not Applicable    SEPSIS: None    At the conclusion of the encounter I discussed the results of all of the tests and the disposition. The questions " were answered. The patient or family acknowledged understanding and was agreeable with the care plan.      CRITICAL CARE:  N/A    HPI    Patient information was obtained from: patient.    Use of : N/A.       Glenna Estrada is a 74 year old female who presents with UTI     Patient reports chills, fatigue, dysuria, generalized weakness, and fever that began this morning (07/15/25). Patient called her daughter this morning, who then had concern for patients confusion. They went to urgent care, and were reccommended to be evaluated at the Emergency Department.     Patient has no history of dementia, and has not taken any antibiotics. Patient denies nausea, vomiting, back pain, or any other symptoms at this time.    Per Chart Review: 7/15/2025 [facility] [place] patient arrived with daughter for evaluation of fever, and confusion. Patient woke up this morning with some chills, and generalized weakness. Her complete blood count was preliminarily flagged for abnormality with a initial WBC of 28.09. She left AMA, preferred to go home without any treatment. Patient's daughter was called and she was still with the patient. Reiterated that it would be best to be seen in the emergency department for the concern of urosepsis. Patient's confusion seemed to be worsening. The emergency department was called to give report about the patient's condition and suspected diagnosis. Diagnosed with acute cystitis without hematuria and prescribed ciprofloxacin 500 mg.       REVIEW OF SYSTEMS  All other systems negative unless noted in HPI.    PAST MEDICAL HISTORY:  Past Medical History:   Diagnosis Date    Anemia     Arthritis     Diabetes mellitus (H)     Disease of thyroid gland     Dyslipidemia     History of blood clots     DVT    History of blood transfusion reaction     Rash    History of transfusion     Hyperparathyroidism     Lymphedema     Metabolic syndrome     Morbid obesity with BMI of 50.0-59.9,  adult (H)     Other and unspecified postsurgical nonabsorption        PAST SURGICAL HISTORY:  Past Surgical History:   Procedure Laterality Date    ABDOMEN SURGERY      ARTHROSCOPY SHOULDER ROTATOR CUFF REPAIR      JOINT REPLACEMENT Left     ESTEFANÍA    NE LAP GASTRIC BYPASS/CIERRA-EN-Y N/A 6/9/2014    Procedure: LAPAROSCOPIC CIERRA-EN-Y GASTRIC BYPASS WITH OMENTOPEXY, POSSIBLE OPEN (Room 4710);  Surgeon: Timbo Sweet MD;  Location: [facility];  Service: General    TONSILLECTOMY & ADENOIDECTOMY      TOTAL HIP ARTHROPLASTY Left 12/31/2014    Procedure: LEFT TOTAL HIP ARTHROPLASTY;  Surgeon: Landon Canales MD;  Location: [facility];  Service:     TUBAL LIGATION Bilateral     VASCULAR SURGERY  June 2014    Right leg skin graft         CURRENT MEDICATIONS:    Current Facility-Administered Medications   Medication Dose Route Frequency Provider Last Rate Last Admin    cefTRIAXone (ROCEPHIN) 2 g vial to attach to  ml bag for ADULTS or NS 50 ml bag for PEDS  2 g Intravenous Once Elise Lancaster MD   2 g at 07/15/25 4561     Current Outpatient Medications   Medication Sig Dispense Refill    acetaminophen (TYLENOL) 650 MG CR tablet Take by mouth every 12 hours      amLODIPine (NORVASC) 10 MG tablet Take 1 tablet (10 mg) by mouth daily. 90 tablet 3    aspirin 81 MG EC tablet [ASPIRIN 81 MG EC TABLET] Take 81 mg by mouth daily.      Biotin 5000 MCG TABS Take 5,000 mcg/day by mouth daily      calcium citrate-vitamin D (CITRACAL+D) 315-200 mg-unit per tablet [CALCIUM CITRATE-VITAMIN D (CITRACAL+D) 315-200 MG-UNIT PER TABLET] Take 1 tablet by mouth 2 (two) times a day.      cholecalciferol, vitamin D3, 5,000 unit Tab [CHOLECALCIFEROL, VITAMIN D3, 5,000 UNIT TAB] Take 1 tablet by mouth daily.      ciprofloxacin (CIPRO) 500 MG tablet Take 1 tablet (500 mg) by mouth 2 times daily. 14 tablet 0    folic acid (FOLVITE) 800 MCG tablet [FOLIC ACID (FOLVITE) 800 MCG TABLET] Take 800 mcg by mouth daily.        gemfibrozil (LOPID) 600 MG tablet Take 1 tablet (600 mg) by mouth 2 times daily. 180 tablet 3    glucosamine-chondroitin 500-400 mg tablet [GLUCOSAMINE-CHONDROITIN 500-400 MG TABLET] Take 1 tablet by mouth daily.      Lactobacillus rhamnosus GG (CULTURELLE) 10-15 Billion cell capsule [LACTOBACILLUS RHAMNOSUS GG (CULTURELLE) 10-15 BILLION CELL CAPSULE] Take 1 capsule by mouth daily.      levothyroxine (SYNTHROID/LEVOTHROID) 125 MCG tablet Take 1 tablet (125 mcg) by mouth daily. 90 tablet 3    lisinopril-hydrochlorothiazide (ZESTORETIC) 20-25 MG tablet Take 2 tablets by mouth daily. 180 tablet 3    metoprolol succinate ER (TOPROL XL) 25 MG 24 hr tablet Take 1 tablet (25 mg) by mouth daily. 90 tablet 3    metoprolol succinate ER (TOPROL XL) 50 MG 24 hr tablet Take 1 tablet (50 mg) by mouth daily. 90 tablet 3    multivitamin with minerals tablet [MULTIVITAMIN WITH MINERALS TABLET] Take 1 tablet by mouth 2 (two) times a day.      oxyBUTYnin (DITROPAN) 5 MG tablet Take 1 tablet (5 mg) by mouth 2 times daily. 180 tablet 3    propranolol (INDERAL) 10 MG tablet Take 1 tablet (10 mg) by mouth 3 times daily. 270 tablet 3    thiamine (VITAMIN B-1) 50 MG tablet [THIAMINE (VITAMIN B-1) 50 MG TABLET] Take 100 mg by mouth daily.      vit A,C & E-lutein-minerals (OCUVITE WITH LUTEIN) 1,000 unit-200 mg-60 unit-2 mg Tab [VIT A,C & E-LUTEIN-MINERALS (OCUVITE WITH LUTEIN) 1,000 UNIT-200 MG-60 UNIT-2 MG TAB] Take 1 tablet by mouth daily.           ALLERGIES:  Allergies   Allergen Reactions    Penicillins Hives    Latex Rash     Pt states she can tolerate short-term    Silver [Silver Protein] Rash       FAMILY HISTORY:  Family History   Problem Relation Age of Onset    Breast Cancer Mother     Heart Disease Father     Early Death Father     Pancreatic Cancer Sister     Heart Disease Brother     Heart Disease Paternal Grandfather        SOCIAL HISTORY:  Social History     Socioeconomic History    Marital status:    Tobacco Use     Smoking status: Former     Current packs/day: 0.00     Average packs/day: 1.5 packs/day for 20.0 years (30.0 ttl pk-yrs)     Types: Cigarettes     Start date: 1972     Quit date: 1992     Years since quittin.7     Passive exposure: Past    Smokeless tobacco: Never   Vaping Use    Vaping status: Never Used   Substance and Sexual Activity    Alcohol use: Yes     Comment: Alcoholic Drinks/day: rarely    Drug use: No    Sexual activity: Not Currently     Birth control/protection: Post-menopausal     Social Drivers of Health     Financial Resource Strain: Low Risk  (2024)    Financial Resource Strain     Within the past 12 months, have you or your family members you live with been unable to get utilities (heat, electricity) when it was really needed?: No   Food Insecurity: Low Risk  (2024)    Food Insecurity     Within the past 12 months, did you worry that your food would run out before you got money to buy more?: No     Within the past 12 months, did the food you bought just not last and you didn t have money to get more?: No   Transportation Needs: Low Risk  (2024)    Transportation Needs     Within the past 12 months, has lack of transportation kept you from medical appointments, getting your medicines, non-medical meetings or appointments, work, or from getting things that you need?: No   Physical Activity: Inactive (2024)    Exercise Vital Sign     Days of Exercise per Week: 0 days     Minutes of Exercise per Session: 0 min   Stress: No Stress Concern Present (2024)    [employer] [employer] - Occupational Stress Questionnaire     Feeling of Stress : Not at all   Social Connections: Unknown (2024)    Social Connection and Isolation Panel [NHANES]     Frequency of Social Gatherings with Friends and Family: Once a week   Interpersonal Safety: Low Risk  (2024)    Interpersonal Safety     Do you feel physically and emotionally safe where you  "currently live?: Yes     Within the past 12 months, have you been hit, slapped, kicked or otherwise physically hurt by someone?: No     Within the past 12 months, have you been humiliated or emotionally abused in other ways by your partner or ex-partner?: No   Housing Stability: Low Risk  (12/11/2024)    Housing Stability     Do you have housing? : Yes     Are you worried about losing your housing?: No       VITALS:  Patient Vitals for the past 24 hrs:   BP Temp Temp src Pulse Resp SpO2 Height Weight   07/15/25 2203 -- -- -- 73 -- 99 % -- --   07/15/25 2202 -- -- -- 73 -- 98 % -- --   07/15/25 1807 139/63 100.6  F (38.1  C) Oral 90 18 94 % 1.702 m (5' 7\") 121.1 kg (267 lb)       PHYSICAL EXAM    VITAL SIGNS: /63   Pulse 73   Temp 100.6  F (38.1  C) (Oral)   Resp 18   Ht 1.702 m (5' 7\")   Wt 121.1 kg (267 lb)   LMP  (LMP Unknown)   SpO2 99%   BMI 41.82 kg/m    Physical Exam  Vitals and nursing note reviewed.   Constitutional:       General: She is not in acute distress.     Appearance: She is obese. She is not toxic-appearing.   HENT:      Head: Normocephalic and atraumatic.   Eyes:      General: No scleral icterus.        Right eye: No discharge.         Left eye: No discharge.      Pupils: Pupils are equal, round, and reactive to light.   Cardiovascular:      Rate and Rhythm: Normal rate and regular rhythm.   Pulmonary:      Effort: Pulmonary effort is normal. No respiratory distress.      Breath sounds: No wheezing.   Abdominal:      General: There is no distension.      Palpations: Abdomen is soft.      Tenderness: There is no abdominal tenderness. There is no guarding or rebound.   Musculoskeletal:         General: No swelling or deformity.      Cervical back: Neck supple. No rigidity.   Skin:     General: Skin is warm and dry.      Capillary Refill: Capillary refill takes less than 2 seconds.      Findings: No bruising or erythema.   Neurological:      General: No focal deficit present.      " Mental Status: She is alert and oriented to person, place, and time. Mental status is at baseline.   Psychiatric:         Mood and Affect: Mood normal.         Behavior: Behavior normal.         LABS  Labs Ordered and Resulted from Time of ED Arrival to Time of ED Departure   BLOOD GAS VENOUS - Abnormal       Result Value    pH Venous 7.38      pCO2 Venous 43      pO2 Venous 17 (*)     Bicarbonate Venous 25      Base Excess/Deficit Venous 0.0      FIO2 21      Oxyhemoglobin Venous 23 (*)     O2 Sat, Venous 23.5 (*)    CBC WITH PLATELETS AND DIFFERENTIAL - Abnormal    WBC Count 28.2 (*)     RBC Count 4.12      Hemoglobin 11.7      Hematocrit 35.2      MCV 85      MCH 28.4      MCHC 33.2      RDW 13.4      Platelet Count 253      % Neutrophils 92      % Lymphocytes 2      % Monocytes 4      % Eosinophils 0      % Basophils 0      % Immature Granulocytes 1      NRBCs per 100 WBC 0      Absolute Neutrophils 26.0 (*)     Absolute Lymphocytes 0.7 (*)     Absolute Monocytes 1.2      Absolute Eosinophils 0.0      Absolute Basophils 0.1      Absolute Immature Granulocytes 0.3      Absolute NRBCs 0.0     LACTIC ACID WHOLE BLOOD WITH 1X REPEAT IN 2 HR WHEN >2 - Normal    Lactic Acid, Initial 2.0     BLOOD CULTURE         RADIOLOGY  No orders to display      NA      EKG:    NA       PROCEDURES:  N/A      MEDICATIONS GIVEN IN THE EMERGENCY:  Medications   cefTRIAXone (ROCEPHIN) 2 g vial to attach to  ml bag for ADULTS or NS 50 ml bag for PEDS (2 g Intravenous $New Bag 7/15/25 2158)   acetaminophen (TYLENOL) tablet 650 mg (650 mg Oral Not Given 7/15/25 2154)       NEW PRESCRIPTIONS STARTED AT TODAY'S ER VISIT  New Prescriptions    No medications on file        I, Fernanda Haynes, am serving as a scribe to document services personally performed by Elise Lancaster MD, based on my observations and the provider's statements to me.  I, Elise Lancaster MD, attest that Fernanda Haynes is acting in a scribe capacity, has observed my  performance of the services and has documented them in accordance with my direction.     Elise Lancaster MD  Emergency Medicine  [facility] EMERGENCY DEPARTMENT  [facility] [facility] [id number] [phone]  Dept: [phone]             Elise Lancaster MD  07/16/25 0053

## 2025-07-17 ENCOUNTER — APPOINTMENT (OUTPATIENT)
Dept: OCCUPATIONAL THERAPY | Facility: HOSPITAL | Age: 75
DRG: 871 | End: 2025-07-17
Payer: COMMERCIAL

## 2025-07-17 VITALS
WEIGHT: 273.37 LBS | BODY MASS INDEX: 43.93 KG/M2 | OXYGEN SATURATION: 92 % | SYSTOLIC BLOOD PRESSURE: 140 MMHG | RESPIRATION RATE: 20 BRPM | HEART RATE: 59 BPM | HEIGHT: 66 IN | TEMPERATURE: 98.4 F | DIASTOLIC BLOOD PRESSURE: 66 MMHG

## 2025-07-17 LAB
ANION GAP SERPL CALCULATED.3IONS-SCNC: 11 MMOL/L (ref 7–15)
BACTERIA SPEC CULT: ABNORMAL
BACTERIA SPEC CULT: ABNORMAL
BACTERIA SPEC CULT: NORMAL
BACTERIA UR CULT: ABNORMAL
BACTERIA WND CULT: ABNORMAL
BACTERIA WND CULT: ABNORMAL
BUN SERPL-MCNC: 27.3 MG/DL (ref 8–23)
CALCIUM SERPL-MCNC: 9.1 MG/DL (ref 8.8–10.4)
CHLORIDE SERPL-SCNC: 104 MMOL/L (ref 98–107)
CREAT SERPL-MCNC: 0.89 MG/DL (ref 0.51–0.95)
EGFRCR SERPLBLD CKD-EPI 2021: 68 ML/MIN/1.73M2
ERYTHROCYTE [DISTWIDTH] IN BLOOD BY AUTOMATED COUNT: 13.3 % (ref 10–15)
GLUCOSE SERPL-MCNC: 111 MG/DL (ref 70–99)
GRAM STAIN RESULT: ABNORMAL
GRAM STAIN RESULT: ABNORMAL
HCO3 SERPL-SCNC: 22 MMOL/L (ref 22–29)
HCT VFR BLD AUTO: 30.8 % (ref 35–47)
HGB BLD-MCNC: 10.1 G/DL (ref 11.7–15.7)
MAGNESIUM SERPL-MCNC: 1.8 MG/DL (ref 1.7–2.3)
MCH RBC QN AUTO: 28.5 PG (ref 26.5–33)
MCHC RBC AUTO-ENTMCNC: 32.8 G/DL (ref 31.5–36.5)
MCV RBC AUTO: 87 FL (ref 78–100)
PHOSPHATE SERPL-MCNC: 2.7 MG/DL (ref 2.5–4.5)
PLATELET # BLD AUTO: 211 10E3/UL (ref 150–450)
POTASSIUM SERPL-SCNC: 4 MMOL/L (ref 3.4–5.3)
RBC # BLD AUTO: 3.55 10E6/UL (ref 3.8–5.2)
SODIUM SERPL-SCNC: 137 MMOL/L (ref 135–145)
WBC # BLD AUTO: 11.5 10E3/UL (ref 4–11)

## 2025-07-17 PROCEDURE — 99239 HOSP IP/OBS DSCHRG MGMT >30: CPT | Performed by: FAMILY MEDICINE

## 2025-07-17 PROCEDURE — 84100 ASSAY OF PHOSPHORUS: CPT | Performed by: FAMILY MEDICINE

## 2025-07-17 PROCEDURE — 80048 BASIC METABOLIC PNL TOTAL CA: CPT | Performed by: FAMILY MEDICINE

## 2025-07-17 PROCEDURE — 97535 SELF CARE MNGMENT TRAINING: CPT | Mod: GO

## 2025-07-17 PROCEDURE — 85018 HEMOGLOBIN: CPT | Performed by: FAMILY MEDICINE

## 2025-07-17 PROCEDURE — 250N000011 HC RX IP 250 OP 636: Performed by: STUDENT IN AN ORGANIZED HEALTH CARE EDUCATION/TRAINING PROGRAM

## 2025-07-17 PROCEDURE — 250N000013 HC RX MED GY IP 250 OP 250 PS 637: Performed by: STUDENT IN AN ORGANIZED HEALTH CARE EDUCATION/TRAINING PROGRAM

## 2025-07-17 PROCEDURE — 36415 COLL VENOUS BLD VENIPUNCTURE: CPT | Performed by: FAMILY MEDICINE

## 2025-07-17 PROCEDURE — 83735 ASSAY OF MAGNESIUM: CPT | Performed by: FAMILY MEDICINE

## 2025-07-17 PROCEDURE — 250N000013 HC RX MED GY IP 250 OP 250 PS 637: Performed by: FAMILY MEDICINE

## 2025-07-17 RX ORDER — METOPROLOL SUCCINATE 25 MG/1
25 TABLET, EXTENDED RELEASE ORAL DAILY
Status: SHIPPED
Start: 2025-07-17

## 2025-07-17 RX ORDER — METOPROLOL SUCCINATE 50 MG/1
50 TABLET, EXTENDED RELEASE ORAL DAILY
Status: SHIPPED
Start: 2025-07-17

## 2025-07-17 RX ORDER — CEFPODOXIME PROXETIL 200 MG/1
200 TABLET, FILM COATED ORAL 2 TIMES DAILY
Qty: 16 TABLET | Refills: 0 | Status: SHIPPED | OUTPATIENT
Start: 2025-07-17 | End: 2025-07-25

## 2025-07-17 RX ADMIN — PROPRANOLOL HYDROCHLORIDE 10 MG: 10 TABLET ORAL at 00:01

## 2025-07-17 RX ADMIN — METOPROLOL SUCCINATE 75 MG: 25 TABLET, FILM COATED, EXTENDED RELEASE ORAL at 08:18

## 2025-07-17 RX ADMIN — PROPRANOLOL HYDROCHLORIDE 10 MG: 10 TABLET ORAL at 08:18

## 2025-07-17 RX ADMIN — AMLODIPINE BESYLATE 10 MG: 5 TABLET ORAL at 08:19

## 2025-07-17 RX ADMIN — LISINOPRIL AND HYDROCHLOROTHIAZIDE 2 TABLET: 25; 20 TABLET ORAL at 09:39

## 2025-07-17 RX ADMIN — LEVOTHYROXINE SODIUM 125 MCG: 0.1 TABLET ORAL at 08:22

## 2025-07-17 RX ADMIN — Medication 1 TABLET: at 09:29

## 2025-07-17 RX ADMIN — PROPRANOLOL HYDROCHLORIDE 10 MG: 10 TABLET ORAL at 13:24

## 2025-07-17 RX ADMIN — GEMFIBROZIL 600 MG: 600 TABLET ORAL at 09:37

## 2025-07-17 RX ADMIN — ENOXAPARIN SODIUM 40 MG: 40 INJECTION SUBCUTANEOUS at 09:29

## 2025-07-17 RX ADMIN — ASPIRIN 81 MG: 81 TABLET, COATED ORAL at 09:29

## 2025-07-17 RX ADMIN — THIAMINE HCL TAB 100 MG 100 MG: 100 TAB at 09:29

## 2025-07-17 RX ADMIN — Medication 800 MCG: at 09:38

## 2025-07-17 ASSESSMENT — ACTIVITIES OF DAILY LIVING (ADL)
ADLS_ACUITY_SCORE: 40
ADLS_ACUITY_SCORE: 40
ADLS_ACUITY_SCORE: 42
ADLS_ACUITY_SCORE: 40
ADLS_ACUITY_SCORE: 42
ADLS_ACUITY_SCORE: 40
ADLS_ACUITY_SCORE: 42
ADLS_ACUITY_SCORE: 40
ADLS_ACUITY_SCORE: 43
ADLS_ACUITY_SCORE: 40
ADLS_ACUITY_SCORE: 40

## 2025-07-17 NOTE — PROGRESS NOTES
Lymphedema Discharge Summary    Reason for therapy discharge:    Discharged to home.    Progress towards therapy goal(s). See goals on Care Plan in [facility] electronic health record for goal details.  Goals met    Therapy recommendation(s):    Continue wearing lymph wraps until tomorrow 7/18 - able to complete wound cares at home

## 2025-07-17 NOTE — DISCHARGE SUMMARY
"[facility]  Hospitalist Discharge Summary      Date of Admission:  7/15/2025  Date of Discharge:  7/17/2025  2:24 PM  Discharging Provider: Mary Martinez MD  Discharge Service: Hospitalist Service    Discharge Diagnoses     Sepsis 2/2 Complicated UTI due to Gram negative bacilli and E. Coli   Acute toxic encephalopathy, resolved  Lymphedema with chronic bilateral lower extremity wounds; Staph aureus growing on culture  Hypertension  Hypothyroidism  Hyperlipidemia  HUEY    Clinically Significant Risk Factors     # Morbid Obesity: Estimated body mass index is 44.12 kg/m  as calculated from the following:    Height as of this encounter: 1.676 m (5' 6\").    Weight as of this encounter: 124 kg (273 lb 5.9 oz).       Follow-ups Needed After Discharge   Follow-up Appointments       Hospital Follow-up with Existing Primary Care Provider (PCP)          Schedule Primary Care visit within: 7 Days               Unresulted Labs Ordered in the Past 30 Days of this Admission       Date and Time Order Name Status Description    7/16/2025  9:47 AM Wound Aerobic Bacterial Culture Routine With Gram Stain Preliminary     7/15/2025  7:44 PM Blood Culture Peripheral blood (BC) Arm, Right Preliminary     7/15/2025  4:12 PM BLOOD CULTURE Preliminary             Discharge Disposition   Discharged to home  Condition at discharge: Stable    Hospital Course   Glenna Estrada is a 74 year old female with a past medical history of HTN, HLD, Hypothyroidism who was admitted on 7/15/25 after presenting to [facility] ED for eval fever and Acute Encephalopathy and found to have UTI.  She was treated with IV fluids and ceftriaxone.  Urine growing out E. coli pansensitive and blood culture growing Pasteurella multocida.  Responded well with no further fever and resolution of her leukocytosis.  CT scan without hydronephrosis or obstruction.  Transition down to cefpodoxime for discharge with Pasteurella highly sensitive to this as well.  " Wound of right lower extremity where she has had chronic venous stasis wounds was also cultured and growing Staph aureus also likely sensitive to cefpodoxime.    She will continue doing outpatient wound and lymphedema care.  Discharged home in good condition.  Detailed course per below      #Sepsis 2/2 Complicated UTI  Bacteremia -gram-negative bacilli; Pasteurella  ---- WBC 28.2, fever 100.6F, HR 90s, LA 2.0  -Status post IV fluid bolus 30mL/kg on admission  - treated with Ceftriaxone 2g daily    -- Negative tick Panel from ED  --- CT negative hydronephrosis/retention  --- Postvoid residuals negative  --- CT did show some pelvic lymphadenopathy on the right.  Patient wonders if this is secondary to her wounds and lymphedema treatment over the years.  Per CT could consider repeat CT to ensure lymphadenopathy stability in 1 year.    #Acute Toxic Encephalopathy -   --resolved; due to UTI/sepsis  -normal TSH, B12      #Bilateral Lower Extremities Wounds  #Lower Extremities Venous Stasis  - On and off ulcerations for many years.  Right more than left  --- Examined with wound seen but does not look like source of sepsis  - Culture with Staph aureus.  Sensitivities pending  - Ceftriaxone as above  - Wound care consulted  --- Lymphedema therapy done while here      Consultations This Hospital Stay   WOUND OSTOMY CONTINENCE NURSE  IP CONSULT  LYMPHEDEMA THERAPY IP CONSULT    Code Status   Full Code    Time Spent on this Encounter   I, Mary Martinez MD, personally saw the patient today and spent greater than 30 minutes discharging this patient.       Mary Martinez MD  [address] [address]  Phone: [phone]  Fax: [phone]  ______________________________________________________________________    Physical Exam   Vital Signs: Temp: 98.4  F (36.9  C) Temp src: Oral BP: (!) 140/66 Pulse: 59   Resp: 20 SpO2: 92 % O2 Device: None (Room air)    Weight: 273 lbs 5.93  oz  General Appearance: Pleasant female no apparent distress  Respiratory: Clear to auscultation bilaterally  Cardiovascular: Regular rate and rhythm without murmurs rubs or gallops  GI: Obese soft and nontender  Skin: Bilateral lower extremity lymphedema noted and wraps  Other: Neurologically grossly intact.  She is alert and oriented.  No deficits appreciated       Primary Care Physician   Jacqui Molina    Discharge Orders      Reason for your hospital stay    --Urinary tract infection     Activity    Your activity upon discharge: activity as tolerated     Diet    Follow this diet upon discharge: Current Diet:Orders Placed This Encounter      Regular Diet Adult     Hospital Follow-up with Existing Primary Care Provider (PCP)            Significant Results and Procedures   Most Recent 3 CBC's:  Recent Labs   Lab Test 07/17/25  0648 07/16/25  0822 07/15/25  2119   WBC 11.5* 19.8* 28.2*   HGB 10.1* 10.4* 11.7   MCV 87 87 85    231 253     Most Recent 3 BMP's:  Recent Labs   Lab Test 07/17/25  0648 07/16/25  0822 07/15/25  1626    136 137   POTASSIUM 4.0 3.9 4.1   CHLORIDE 104 102 102   CO2 22 24 27   BUN 27.3* 26.0* 19   CR 0.89 0.97* 0.90   ANIONGAP 11 10 8   MECHE 9.1 9.2 9.3   * 109* 121*     7-Day Micro Results       Collected Updated Procedure Result Status      07/16/2025 0951 07/17/2025 1437 Wound Aerobic Bacterial Culture Routine With Gram Stain [[id number]]   (Abnormal)   Wound from Leg, Below Knee, Right    Preliminary result Component Value   Culture Culture in progress  [P]     2+ Staphylococcus aureus  [P]    Gram Stain Result 1+ Gram positive cocci  [P]     1+ WBC seen  [P]                07/15/2025 2120 07/16/2025 2303 Blood Culture Peripheral blood (BC) Arm, Right [[id number]]   Peripheral blood (BC) from Arm, Right    Preliminary result Component Value   Culture No growth after 1 day  [P]                07/15/2025 1626 07/16/2025 1100 Tick-Borne Disease Panel (Non-Lyme) by PCR  [[id number]]    Peripheral Blood    Final result Component Value   Anaplasma phagocytophilum by PCR Not Detected   Babesia species by PCR Not Detected   Ehrlichia species by PCR Not Detected            07/15/2025 1626 07/17/2025 1505 Blood Culture Peripheral blood (BC) Arm, Right [[id number]]    (Abnormal)   Peripheral blood (BC) from Arm, Right    Preliminary result Component Value   Culture Positive on the 1st day of incubation  [P]     Pasteurella multocida  [P]     1 of 1 bottles               07/15/2025 1626 07/16/2025 1431 Blood Culture ID Panel, PCR [[id number]]    Peripheral blood (BC) from Arm, Right    Final result Component Value   Enterococcus faecalis Not Detected   Enterococcus faecium Not Detected   Listeria monocytogenes Not Detected   Staphylococcus species Not Detected   Staphylococcus aureus Not Detected   Staphylococcus epidermidis Not Detected   Staphylococcus lugdunensis Not Detected   Streptococcus species Not Detected   Streptococcus agalactiae Not Detected   Streptococcus pneumoniae Not Detected   Streptococcus pyogenes Not Detected   A. baumannii complex Not Detected   Bacteroides fragilis Not Detected   Enterobacterales Not Detected   Enterobacter cloacae complex Not Detected   Escherichia coli Not Detected   Klebsiella aerogenes Not Detected   Klebsiella oxytoca Not Detected   Klebsiella pneumoniae group Not Detected   Proteus species Not Detected   Salmonella species Not Detected   Serratia marcescens Not Detected   Haemophilus influenzae Not Detected   Neisseria meningitidis Not Detected   Pseudomonas aeruginosa Not Detected   Stenotrophomonas maltophilia Not Detected   Candida albicans Not Detected   Candida auris Not Detected   Candida glabrata Not Detected   Jenna krusei Not Detected   Candida parapsilosis Not Detected   Candida tropicalis Not Detected   Cryptococcus neoformans/gattii Not Detected            07/15/2025 1602 07/15/2025 2002 Influenza A/B, RSV and SARS-CoV2 PCR  (COVID-19) Nasopharyngeal [[id number]]    Swab from Nasopharyngeal    Final result Component Value   Influenza A PCR Negative   Influenza B PCR Negative   RSV PCR Negative   SARS CoV2 PCR Negative   NEGATIVE: SARS-CoV-2 (COVID-19) RNA not detected, presumed negative.            07/15/2025 1602 07/17/2025 0523 Urine Culture [[id number]]    (Abnormal)   Urine, Clean Catch    Final result Component Value   Culture >100,000 CFU/mL Escherichia coli        Susceptibility        Escherichia coli      TAY      Ampicillin <=2 ug/mL Susceptible      Ampicillin/ Sulbactam <=2 ug/mL Susceptible      Cefazolin <=1 ug/mL Susceptible      Cefepime <=0.12 ug/mL Susceptible      Ceftazidime <=0.5 ug/mL Susceptible      Ceftriaxone <=0.25 ug/mL Susceptible      Ciprofloxacin <=0.06 ug/mL Susceptible      Gentamicin <=1 ug/mL Susceptible      Levofloxacin <=0.12 ug/mL Susceptible      Nitrofurantoin <=16 ug/mL Susceptible      Piperacillin/Tazobactam <=4 ug/mL Susceptible      Trimethoprim/Sulfamethoxazole <=1/19 ug/mL Susceptible                               ,   Results for orders placed or performed during the hospital encounter of 07/15/25   CT Abdomen Pelvis w Contrast    Narrative    EXAM: CT ABDOMEN PELVIS W CONTRAST  LOCATION: [facility]  DATE: 7/16/2025    INDICATION: sepsis likely from UTI   hx OAB   eval for hydronephrosis   pyelo  COMPARISON: 3/2/2020  TECHNIQUE: CT scan of the abdomen and pelvis was performed following injection of IV contrast. Multiplanar reformats were obtained. Dose reduction techniques were used.  CONTRAST: [id number] 90ml    FINDINGS:   LOWER CHEST: Normal.    HEPATOBILIARY: Contracted gallbladder containing gallstones or sludge. No inflammatory changes. Liver and bile ducts appear normal.    PANCREAS: 9 mm cyst junction pancreatic head and neck unchanged likely a benign IPMN. No pancreatic inflammatory changes.    SPLEEN: Calcified granulomata.    ADRENAL GLANDS:  Normal.    KIDNEYS/BLADDER: Small benign cyst right kidney minimally changed and needs no further workup. Kidneys otherwise negative, no hydronephrosis.    BOWEL: No obstruction or inflammatory change. Previous partial right hemicolectomy and gastric bypass surgery.    LYMPH NODES: Right inguinal and pelvic lymph nodes modestly prominent but only mildly changed. The largest node adjacent to right external iliac vessels measures 2.8 x 1.8 cm, previously 2.5 x 1.5 cm. Additional right obturator region node measures 2.3 x   1.4 cm, previously 2 x 1 cm.    VASCULATURE: Moderate atherosclerotic calcifications.    PELVIC ORGANS: No pelvic mass or free fluid.    MUSCULOSKELETAL: Left hip arthroplasty. Degenerative changes right hip and throughout lumbar spine.      Impression    IMPRESSION:   1.  No definite etiology for symptoms, no inflammatory focus identified.  2.  Cholelithiasis.  3.  Mildly right inguinal and right sided pelvic lymphadenopathy slightly larger than exam from 5 years ago favoring a chronic reactive process. Consider repeat exam in one year.       Discharge Medications      Review of your medicines        START taking        Dose / Directions   cefpodoxime 200 MG tablet  Commonly known as: VANTIN  Used for: Urinary tract infection in female      Dose: 200 mg  Take 1 tablet (200 mg) by mouth 2 times daily for 8 days.  Quantity: 16 tablet  Refills: 0            CONTINUE these medicines which have NOT CHANGED        Dose / Directions   acetaminophen 650 MG CR tablet  Commonly known as: TYLENOL      Take by mouth every 12 hours  Refills: 0     amLODIPine 10 MG tablet  Commonly known as: NORVASC  Used for: Essential hypertension      Dose: 10 mg  Take 1 tablet (10 mg) by mouth daily.  Quantity: 90 tablet  Refills: 3     aspirin 81 MG EC tablet  Commonly known as: ASA      Dose: 81 mg  [ASPIRIN 81 MG EC TABLET] Take 81 mg by mouth daily.  Refills: 0     Biotin 5000 MCG Tabs      Dose: 5,000 mcg/day  Take  5,000 mcg/day by mouth daily  Refills: 0     calcium citrate-vitamin D 315-200 MG-UNIT Tabs per tablet  Commonly known as: CITRACAL      Dose: 1 tablet  [CALCIUM CITRATE-VITAMIN D (CITRACAL+D) 315-200 MG-UNIT PER TABLET] Take 1 tablet by mouth 2 (two) times a day.  Refills: 0     folic acid 800 MCG tablet      Dose: 800 mcg  [FOLIC ACID (FOLVITE) 800 MCG TABLET] Take 800 mcg by mouth daily.  Refills: 0     gemfibrozil 600 MG tablet  Commonly known as: LOPID  Used for: Dyslipidemia      Dose: 600 mg  Take 1 tablet (600 mg) by mouth 2 times daily.  Quantity: 180 tablet  Refills: 3     glucosamine-chondroitin 500-400 MG tablet      Dose: 1 tablet  [GLUCOSAMINE-CHONDROITIN 500-400 MG TABLET] Take 1 tablet by mouth daily.  Refills: 0     lactobacillus rhamnosus (GG) 10 B CELL capsule  Commonly known as: CULTURELLE      Dose: 1 capsule  [LACTOBACILLUS RHAMNOSUS GG (CULTURELLE) 10-15 BILLION CELL CAPSULE] Take 1 capsule by mouth daily.  Refills: 0     levothyroxine 125 MCG tablet  Commonly known as: SYNTHROID/LEVOTHROID  Used for: Hypothyroidism, unspecified type      Dose: 125 mcg  Take 1 tablet (125 mcg) by mouth daily.  Quantity: 90 tablet  Refills: 3     lisinopril-hydrochlorothiazide 20-25 MG tablet  Commonly known as: ZESTORETIC  Used for: Essential hypertension      Dose: 2 tablet  Take 2 tablets by mouth daily.  Quantity: 180 tablet  Refills: 3     * metoprolol succinate ER 25 MG 24 hr tablet  Commonly known as: TOPROL XL  Used for: Essential hypertension      Dose: 25 mg  Take 1 tablet (25 mg) by mouth daily. Take with 50 mg tablet for a total of 75 mg  Refills: 0     * metoprolol succinate ER 50 MG 24 hr tablet  Commonly known as: TOPROL XL  Used for: Essential hypertension      Dose: 50 mg  Take 1 tablet (50 mg) by mouth daily. Take with 25 mg tablet for a total of 75 mg  Refills: 0     * multivitamins w/minerals tablet  Generic drug: multivitamin w/minerals      Dose: 1 tablet  [MULTIVITAMIN WITH MINERALS  TABLET] Take 1 tablet by mouth 2 (two) times a day.  Refills: 0     * multivitamin with lutein Tabs per tablet      Dose: 1 tablet  [VIT A,C & E-LUTEIN-MINERALS (OCUVITE WITH LUTEIN) 1,000 UNIT-200 MG-60 UNIT-2 MG TAB] Take 1 tablet by mouth daily.  Refills: 0     oxyBUTYnin 5 MG tablet  Commonly known as: DITROPAN  Used for: OAB (overactive bladder)      Dose: 5 mg  Take 1 tablet (5 mg) by mouth 2 times daily.  Quantity: 180 tablet  Refills: 3     propranolol 10 MG tablet  Commonly known as: INDERAL  Used for: Anxiety      Dose: 10 mg  Take 1 tablet (10 mg) by mouth 3 times daily.  Quantity: 270 tablet  Refills: 3     thiamine 50 MG Tabs      Dose: 100 mg  [THIAMINE (VITAMIN B-1) 50 MG TABLET] Take 100 mg by mouth daily.  Refills: 0           * This list has 4 medication(s) that are the same as other medications prescribed for you. Read the directions carefully, and ask your doctor or other care provider to review them with you.                STOP taking      ciprofloxacin 500 MG tablet  Commonly known as: CIPRO                  Where to get your medicines        These medications were sent to [id number] [place], [place] - [address]  [address] [address]      Phone: [phone]   cefpodoxime 200 MG tablet       Allergies   Allergies   Allergen Reactions    Penicillins Hives    Latex Rash     Pt states she can tolerate short-term    Silver [Silver Protein] Rash

## 2025-07-17 NOTE — PLAN OF CARE
"  Problem: Adult Inpatient Plan of Care  Goal: Plan of Care Review  Description: The Plan of Care Review/Shift note should be completed every shift.  The Outcome Evaluation is a brief statement about your assessment that the patient is improving, declining, or no change.  This information will be displayed automatically on your shift  note.  Outcome: Met  Goal: Patient-Specific Goal (Individualized)  Description: You can add care plan individualizations to a care plan. Examples of Individualization might be:  \"Parent requests to be called daily at 9am for status\", \"I have a hard time hearing out of my right ear\", or \"Do not touch me to wake me up as it startles  me\".  Outcome: Met  Goal: Absence of Hospital-Acquired Illness or Injury  Outcome: Met  Goal: Optimal Comfort and Wellbeing  Outcome: Met  Intervention: Monitor Pain and Promote Comfort  Recent Flowsheet Documentation  Taken 7/17/2025 0744 by Katiana Luna, RN  Pain Management Interventions:   heat applied   distraction   emotional support  Goal: Readiness for Transition of Care  Outcome: Met     Problem: Delirium  Goal: Optimal Coping  Outcome: Met  Goal: Improved Behavioral Control  Outcome: Met  Goal: Improved Attention and Thought Clarity  Outcome: Met  Goal: Improved Sleep  Outcome: Met     Problem: UTI (Urinary Tract Infection)  Goal: Improved Infection Symptoms  Outcome: Met     Problem: Skin Injury Risk Increased  Goal: Skin Health and Integrity  Outcome: Met  Intervention: Optimize Skin Protection  Recent Flowsheet Documentation  Taken 7/17/2025 1218 by Katiana Luna, RN  Activity Management: ambulated to bathroom  Taken 7/17/2025 1018 by Katiana Luna, RN  Activity Management: ambulated to bathroom  Taken 7/17/2025 0826 by Katiana Luna, RN  Activity Management: ambulated to bathroom     Problem: Comorbidity Management  Goal: Maintenance of Heart Failure Symptom Control  Outcome: Met   Goal Outcome Evaluation:       Patient alert and oriented x 4. " Denied pain prior to discharge. Wound dressings clean, dry, and intact. Lymphedema wraps in place. Discharge instructions discussed with patient and daughter; both verbalized understanding. Wound supplies sent with patient. Copy of AVS handed to patient. Patient left with all personal belongings including cane, cellphone, and eyeglasses.

## 2025-07-17 NOTE — PLAN OF CARE
Goal Outcome Evaluation:      Plan of Care Reviewed With: patient    Overall Patient Progress: improvingOverall Patient Progress: improving     Patient denies pain. Alert and oriented x4. Lymph wraps in place. Daughter at bedside this evening.

## 2025-07-17 NOTE — PLAN OF CARE
Problem: UTI (Urinary Tract Infection)  Goal: Improved Infection Symptoms  Outcome: Progressing     Problem: Skin Injury Risk Increased  Goal: Skin Health and Integrity  Outcome: Progressing     Problem: Comorbidity Management  Goal: Maintenance of Heart Failure Symptom Control  Outcome: Progressing   Goal Outcome Evaluation:         Pt is A & O x4, pleasant, calls appropriately.   BP was elevated overnight, Resident notified  Denies pain  Went to the bathroom, PVR was done, urine retention was   Lines IV RAC, assessed, blood return noticed, denies any soreness or any IV discomfort   Respirations even and unlabored. Speak in full sentences   Edema noted on Bilateral legs, Lymphedema wraps on.   Saturation WNL  Oxygen on RA  Denies SOB/ difficult of breathing/ chest pain  Labs protocol K+ 4.1, Mag1.7, and Phos 2.8  within normal range  Care explained.   Call light within reach   Plan of care on going

## 2025-07-18 ENCOUNTER — MYC MEDICAL ADVICE (OUTPATIENT)
Dept: FAMILY MEDICINE | Facility: CLINIC | Age: 75
End: 2025-07-18
Payer: COMMERCIAL

## 2025-07-18 LAB
BACTERIA WND CULT: ABNORMAL
BACTERIA WND CULT: ABNORMAL
GRAM STAIN RESULT: ABNORMAL
GRAM STAIN RESULT: ABNORMAL

## 2025-07-20 LAB — BACTERIA SPEC CULT: NO GROWTH

## 2025-08-06 ENCOUNTER — PATIENT OUTREACH (OUTPATIENT)
Dept: CARE COORDINATION | Facility: CLINIC | Age: 75
End: 2025-08-06

## 2025-08-06 ENCOUNTER — OFFICE VISIT (OUTPATIENT)
Dept: FAMILY MEDICINE | Facility: CLINIC | Age: 75
End: 2025-08-06
Payer: COMMERCIAL

## 2025-08-06 VITALS
TEMPERATURE: 98.1 F | DIASTOLIC BLOOD PRESSURE: 70 MMHG | RESPIRATION RATE: 18 BRPM | HEIGHT: 66 IN | OXYGEN SATURATION: 96 % | BODY MASS INDEX: 43.12 KG/M2 | HEART RATE: 65 BPM | SYSTOLIC BLOOD PRESSURE: 134 MMHG | WEIGHT: 268.3 LBS

## 2025-08-06 DIAGNOSIS — R59.0 PELVIC LYMPHADENOPATHY: ICD-10-CM

## 2025-08-06 DIAGNOSIS — I89.0 LYMPHEDEMA: ICD-10-CM

## 2025-08-06 DIAGNOSIS — N39.0 SEPSIS DUE TO URINARY TRACT INFECTION (H): Primary | ICD-10-CM

## 2025-08-06 DIAGNOSIS — A41.9 SEPSIS DUE TO URINARY TRACT INFECTION (H): Primary | ICD-10-CM

## 2025-08-06 PROCEDURE — 1111F DSCHRG MED/CURRENT MED MERGE: CPT | Performed by: FAMILY MEDICINE

## 2025-08-06 PROCEDURE — 3078F DIAST BP <80 MM HG: CPT | Performed by: FAMILY MEDICINE

## 2025-08-06 PROCEDURE — G2211 COMPLEX E/M VISIT ADD ON: HCPCS | Performed by: FAMILY MEDICINE

## 2025-08-06 PROCEDURE — 99213 OFFICE O/P EST LOW 20 MIN: CPT | Performed by: FAMILY MEDICINE

## 2025-08-06 PROCEDURE — 3075F SYST BP GE 130 - 139MM HG: CPT | Performed by: FAMILY MEDICINE

## 2025-08-22 ENCOUNTER — HOSPITAL ENCOUNTER (EMERGENCY)
Facility: CLINIC | Age: 75
Discharge: HOME | End: 2025-08-22
Payer: COMMERCIAL

## 2025-08-22 ENCOUNTER — APPOINTMENT (OUTPATIENT)
Dept: RADIOLOGY | Facility: CLINIC | Age: 75
End: 2025-08-22
Payer: COMMERCIAL

## 2025-08-22 VITALS
DIASTOLIC BLOOD PRESSURE: 85 MMHG | OXYGEN SATURATION: 98 % | WEIGHT: 260 LBS | TEMPERATURE: 97.5 F | BODY MASS INDEX: 41.97 KG/M2 | HEART RATE: 54 BPM | RESPIRATION RATE: 20 BRPM | SYSTOLIC BLOOD PRESSURE: 207 MMHG

## 2025-08-22 DIAGNOSIS — E78.5 DYSLIPIDEMIA: ICD-10-CM

## 2025-08-22 DIAGNOSIS — S29.012A MUSCLE STRAIN OF LEFT UPPER BACK, INITIAL ENCOUNTER: Primary | ICD-10-CM

## 2025-08-22 PROCEDURE — 99283 EMERGENCY DEPT VISIT LOW MDM: CPT

## 2025-08-22 PROCEDURE — 250N000013 HC RX MED GY IP 250 OP 250 PS 637

## 2025-08-22 PROCEDURE — 73030 X-RAY EXAM OF SHOULDER: CPT | Mod: LT

## 2025-08-22 RX ORDER — LIDOCAINE 4 G/G
1 PATCH TOPICAL
Status: DISCONTINUED | OUTPATIENT
Start: 2025-08-22 | End: 2025-08-22 | Stop reason: HOSPADM

## 2025-08-22 RX ADMIN — LIDOCAINE 1 PATCH: 4 PATCH TOPICAL at 14:17

## 2025-08-22 ASSESSMENT — COLUMBIA-SUICIDE SEVERITY RATING SCALE - C-SSRS
1. IN THE PAST MONTH, HAVE YOU WISHED YOU WERE DEAD OR WISHED YOU COULD GO TO SLEEP AND NOT WAKE UP?: NO
2. HAVE YOU ACTUALLY HAD ANY THOUGHTS OF KILLING YOURSELF IN THE PAST MONTH?: NO
6. HAVE YOU EVER DONE ANYTHING, STARTED TO DO ANYTHING, OR PREPARED TO DO ANYTHING TO END YOUR LIFE?: NO

## 2025-08-25 RX ORDER — GEMFIBROZIL 600 MG/1
600 TABLET, FILM COATED ORAL 2 TIMES DAILY
Qty: 180 TABLET | Refills: 0 | Status: SHIPPED | OUTPATIENT
Start: 2025-08-25